# Patient Record
Sex: FEMALE | Race: BLACK OR AFRICAN AMERICAN | Employment: FULL TIME | URBAN - METROPOLITAN AREA
[De-identification: names, ages, dates, MRNs, and addresses within clinical notes are randomized per-mention and may not be internally consistent; named-entity substitution may affect disease eponyms.]

---

## 2017-03-20 ENCOUNTER — HOSPITAL ENCOUNTER (OUTPATIENT)
Dept: LAB | Age: 57
Discharge: HOME | End: 2017-03-20
Payer: COMMERCIAL

## 2017-03-20 LAB
ALBUMIN SERPL BCP-MCNC: 4 G/DL (ref 3.4–5)
ALBUMIN/GLOB SERPL: 1.2 {RATIO} (ref 0.8–1.7)
ALP SERPL-CCNC: 83 U/L (ref 45–117)
ALT SERPL-CCNC: 30 U/L (ref 13–56)
ANION GAP BLD CALC-SCNC: 6 MMOL/L (ref 3–18)
AST SERPL W P-5'-P-CCNC: 25 U/L (ref 15–37)
BASOPHILS # BLD AUTO: 0 K/UL (ref 0–0.1)
BASOPHILS # BLD: 1 % (ref 0–2)
BILIRUB SERPL-MCNC: 0.7 MG/DL (ref 0.2–1)
BUN SERPL-MCNC: 22 MG/DL (ref 7–18)
BUN/CREAT SERPL: 24 (ref 12–20)
CALCIUM SERPL-MCNC: 9.3 MG/DL (ref 8.5–10.1)
CHLORIDE SERPL-SCNC: 107 MMOL/L (ref 100–108)
CHOLEST SERPL-MCNC: 186 MG/DL
CO2 SERPL-SCNC: 29 MMOL/L (ref 21–32)
CREAT SERPL-MCNC: 0.93 MG/DL (ref 0.6–1.3)
DIFFERENTIAL METHOD BLD: ABNORMAL
EOSINOPHIL # BLD: 0.2 K/UL (ref 0–0.4)
EOSINOPHIL NFR BLD: 4 % (ref 0–5)
ERYTHROCYTE [DISTWIDTH] IN BLOOD BY AUTOMATED COUNT: 12.8 % (ref 11.6–14.5)
GLOBULIN SER CALC-MCNC: 3.3 G/DL (ref 2–4)
GLUCOSE SERPL-MCNC: 74 MG/DL (ref 74–99)
HCT VFR BLD AUTO: 37.7 % (ref 35–45)
HDLC SERPL-MCNC: 69 MG/DL (ref 40–60)
HDLC SERPL: 2.7 {RATIO} (ref 0–5)
HGB BLD-MCNC: 12.8 G/DL (ref 12–16)
LDLC SERPL CALC-MCNC: 110.2 MG/DL (ref 0–100)
LIPID PROFILE,FLP: ABNORMAL
LYMPHOCYTES # BLD AUTO: 56 % (ref 21–52)
LYMPHOCYTES # BLD: 2.1 K/UL (ref 0.9–3.6)
MCH RBC QN AUTO: 30.9 PG (ref 24–34)
MCHC RBC AUTO-ENTMCNC: 34 G/DL (ref 31–37)
MCV RBC AUTO: 91.1 FL (ref 74–97)
MONOCYTES # BLD: 0.2 K/UL (ref 0.05–1.2)
MONOCYTES NFR BLD AUTO: 5 % (ref 3–10)
NEUTS SEG # BLD: 1.3 K/UL (ref 1.8–8)
NEUTS SEG NFR BLD AUTO: 34 % (ref 40–73)
PLATELET # BLD AUTO: 167 K/UL (ref 135–420)
PMV BLD AUTO: 11.3 FL (ref 9.2–11.8)
POTASSIUM SERPL-SCNC: 4 MMOL/L (ref 3.5–5.5)
PROT SERPL-MCNC: 7.3 G/DL (ref 6.4–8.2)
RBC # BLD AUTO: 4.14 M/UL (ref 4.2–5.3)
SODIUM SERPL-SCNC: 142 MMOL/L (ref 136–145)
T4 SERPL-MCNC: 10.4 UG/DL (ref 4.5–10.9)
TRIGL SERPL-MCNC: 34 MG/DL (ref ?–150)
TSH SERPL DL<=0.05 MIU/L-ACNC: 1.72 UIU/ML (ref 0.36–3.74)
VLDLC SERPL CALC-MCNC: 6.8 MG/DL
WBC # BLD AUTO: 3.7 K/UL (ref 4.6–13.2)

## 2017-03-20 PROCEDURE — 36415 COLL VENOUS BLD VENIPUNCTURE: CPT | Performed by: INTERNAL MEDICINE

## 2017-03-20 PROCEDURE — 80053 COMPREHEN METABOLIC PANEL: CPT | Performed by: INTERNAL MEDICINE

## 2017-03-20 PROCEDURE — 85025 COMPLETE CBC W/AUTO DIFF WBC: CPT | Performed by: INTERNAL MEDICINE

## 2017-03-20 PROCEDURE — 80061 LIPID PANEL: CPT | Performed by: INTERNAL MEDICINE

## 2017-03-20 PROCEDURE — 84436 ASSAY OF TOTAL THYROXINE: CPT | Performed by: INTERNAL MEDICINE

## 2017-08-22 ENCOUNTER — HOSPITAL ENCOUNTER (OUTPATIENT)
Dept: MAMMOGRAPHY | Age: 57
Discharge: HOME | End: 2017-08-22
Attending: INTERNAL MEDICINE
Payer: COMMERCIAL

## 2017-08-22 DIAGNOSIS — Z12.31 VISIT FOR SCREENING MAMMOGRAM: ICD-10-CM

## 2017-08-22 PROCEDURE — 77067 SCR MAMMO BI INCL CAD: CPT

## 2017-10-31 ENCOUNTER — HOSPITAL ENCOUNTER (OUTPATIENT)
Dept: GENERAL RADIOLOGY | Age: 57
Discharge: HOME | End: 2017-10-31
Payer: COMMERCIAL

## 2017-10-31 ENCOUNTER — HOSPITAL ENCOUNTER (OUTPATIENT)
Dept: LAB | Age: 57
Discharge: HOME | End: 2017-10-31
Payer: COMMERCIAL

## 2017-10-31 DIAGNOSIS — M54.41 RIGHT-SIDED LOW BACK PAIN WITH SCIATICA: ICD-10-CM

## 2017-10-31 LAB
ALBUMIN SERPL-MCNC: 3.6 G/DL (ref 3.4–5)
ALBUMIN/GLOB SERPL: 1 {RATIO} (ref 0.8–1.7)
ALP SERPL-CCNC: 84 U/L (ref 45–117)
ALT SERPL-CCNC: 36 U/L (ref 13–56)
ANION GAP SERPL CALC-SCNC: 8 MMOL/L (ref 3–18)
AST SERPL-CCNC: 32 U/L (ref 15–37)
BASOPHILS # BLD: 0 K/UL (ref 0–0.1)
BASOPHILS NFR BLD: 0 % (ref 0–3)
BILIRUB SERPL-MCNC: 1 MG/DL (ref 0.2–1)
BUN SERPL-MCNC: 14 MG/DL (ref 7–18)
BUN/CREAT SERPL: 19 (ref 12–20)
CALCIUM SERPL-MCNC: 9.1 MG/DL (ref 8.5–10.1)
CHLORIDE SERPL-SCNC: 108 MMOL/L (ref 100–108)
CHOLEST SERPL-MCNC: 191 MG/DL
CO2 SERPL-SCNC: 25 MMOL/L (ref 21–32)
CREAT SERPL-MCNC: 0.75 MG/DL (ref 0.6–1.3)
DIFFERENTIAL METHOD BLD: ABNORMAL
EOSINOPHIL # BLD: 0.2 K/UL (ref 0–0.4)
EOSINOPHIL NFR BLD: 9 % (ref 0–5)
ERYTHROCYTE [DISTWIDTH] IN BLOOD BY AUTOMATED COUNT: 12.6 % (ref 11.6–14.5)
GLOBULIN SER CALC-MCNC: 3.7 G/DL (ref 2–4)
GLUCOSE SERPL-MCNC: 78 MG/DL (ref 74–99)
HCT VFR BLD AUTO: 39.2 % (ref 35–45)
HDLC SERPL-MCNC: 79 MG/DL (ref 40–60)
HDLC SERPL: 2.4 {RATIO} (ref 0–5)
HGB BLD-MCNC: 13.1 G/DL (ref 12–16)
LDLC SERPL CALC-MCNC: 106 MG/DL (ref 0–100)
LIPID PROFILE,FLP: ABNORMAL
LYMPHOCYTES # BLD: 1.4 K/UL (ref 0.8–3.5)
LYMPHOCYTES NFR BLD: 54 % (ref 20–51)
MCH RBC QN AUTO: 30.3 PG (ref 24–34)
MCHC RBC AUTO-ENTMCNC: 33.4 G/DL (ref 31–37)
MCV RBC AUTO: 90.7 FL (ref 74–97)
MONOCYTES # BLD: 0.2 K/UL (ref 0–1)
MONOCYTES NFR BLD: 6 % (ref 2–9)
NEUTS BAND NFR BLD MANUAL: 1 % (ref 0–5)
NEUTS SEG # BLD: 0.8 K/UL (ref 1.8–8)
NEUTS SEG NFR BLD: 30 % (ref 42–75)
PLATELET # BLD AUTO: 150 K/UL (ref 135–420)
PLATELET COMMENTS,PCOM: ABNORMAL
PMV BLD AUTO: 11 FL (ref 9.2–11.8)
POTASSIUM SERPL-SCNC: 4.3 MMOL/L (ref 3.5–5.5)
PROT SERPL-MCNC: 7.3 G/DL (ref 6.4–8.2)
RBC # BLD AUTO: 4.32 M/UL (ref 4.2–5.3)
RBC MORPH BLD: ABNORMAL
RBC MORPH BLD: ABNORMAL
SODIUM SERPL-SCNC: 141 MMOL/L (ref 136–145)
T4 SERPL-MCNC: 12.3 UG/DL (ref 4.5–10.9)
TRIGL SERPL-MCNC: 30 MG/DL (ref ?–150)
TSH SERPL DL<=0.05 MIU/L-ACNC: 1.29 UIU/ML (ref 0.36–3.74)
VLDLC SERPL CALC-MCNC: 6 MG/DL
WBC # BLD AUTO: 2.6 K/UL (ref 4.6–13.2)
WBC MORPH BLD: ABNORMAL

## 2017-10-31 PROCEDURE — 80061 LIPID PANEL: CPT | Performed by: INTERNAL MEDICINE

## 2017-10-31 PROCEDURE — 84436 ASSAY OF TOTAL THYROXINE: CPT | Performed by: INTERNAL MEDICINE

## 2017-10-31 PROCEDURE — 85025 COMPLETE CBC W/AUTO DIFF WBC: CPT | Performed by: INTERNAL MEDICINE

## 2017-10-31 PROCEDURE — 80053 COMPREHEN METABOLIC PANEL: CPT | Performed by: INTERNAL MEDICINE

## 2017-10-31 PROCEDURE — 36415 COLL VENOUS BLD VENIPUNCTURE: CPT | Performed by: INTERNAL MEDICINE

## 2017-10-31 PROCEDURE — 73502 X-RAY EXAM HIP UNI 2-3 VIEWS: CPT

## 2018-04-06 ENCOUNTER — TELEPHONE (OUTPATIENT)
Dept: CARDIOLOGY CLINIC | Age: 58
End: 2018-04-06

## 2018-04-06 ENCOUNTER — HOSPITAL ENCOUNTER (OUTPATIENT)
Age: 58
Setting detail: OBSERVATION
Discharge: HOME | End: 2018-04-07
Attending: EMERGENCY MEDICINE | Admitting: INTERNAL MEDICINE
Payer: COMMERCIAL

## 2018-04-06 ENCOUNTER — APPOINTMENT (OUTPATIENT)
Dept: MRI IMAGING | Age: 58
End: 2018-04-06
Attending: PHYSICIAN ASSISTANT
Payer: COMMERCIAL

## 2018-04-06 ENCOUNTER — DOCUMENTATION ONLY (OUTPATIENT)
Dept: CARDIOLOGY CLINIC | Age: 58
End: 2018-04-06

## 2018-04-06 ENCOUNTER — APPOINTMENT (OUTPATIENT)
Dept: GENERAL RADIOLOGY | Age: 58
End: 2018-04-06
Attending: PHYSICIAN ASSISTANT
Payer: COMMERCIAL

## 2018-04-06 DIAGNOSIS — I48.19 PERSISTENT ATRIAL FIBRILLATION (HCC): Primary | ICD-10-CM

## 2018-04-06 DIAGNOSIS — R32 URINARY INCONTINENCE, UNSPECIFIED TYPE: ICD-10-CM

## 2018-04-06 PROBLEM — I48.91 NEW ONSET ATRIAL FIBRILLATION (HCC): Status: ACTIVE | Noted: 2018-04-06

## 2018-04-06 LAB
ANION GAP SERPL CALC-SCNC: 9 MMOL/L (ref 3–18)
APPEARANCE UR: CLEAR
ATRIAL RATE: 147 BPM
BASOPHILS # BLD: 0 K/UL (ref 0–0.1)
BASOPHILS NFR BLD: 0 % (ref 0–2)
BILIRUB UR QL: NEGATIVE
BUN SERPL-MCNC: 16 MG/DL (ref 7–18)
BUN/CREAT SERPL: 20 (ref 12–20)
CALCIUM SERPL-MCNC: 9.8 MG/DL (ref 8.5–10.1)
CALCULATED R AXIS, ECG10: 66 DEGREES
CALCULATED T AXIS, ECG11: 49 DEGREES
CHLORIDE SERPL-SCNC: 110 MMOL/L (ref 100–108)
CK MB CFR SERPL CALC: 0.8 % (ref 0–4)
CK MB CFR SERPL CALC: 1 % (ref 0–4)
CK MB SERPL-MCNC: 1.3 NG/ML (ref 5–25)
CK MB SERPL-MCNC: 1.8 NG/ML (ref 5–25)
CK SERPL-CCNC: 155 U/L (ref 26–192)
CK SERPL-CCNC: 174 U/L (ref 26–192)
CO2 SERPL-SCNC: 25 MMOL/L (ref 21–32)
COLOR UR: YELLOW
CREAT SERPL-MCNC: 0.82 MG/DL (ref 0.6–1.3)
D DIMER PPP FEU-MCNC: 0.37 UG/ML(FEU)
DIAGNOSIS, 93000: NORMAL
DIFFERENTIAL METHOD BLD: ABNORMAL
EOSINOPHIL # BLD: 0.1 K/UL (ref 0–0.4)
EOSINOPHIL NFR BLD: 5 % (ref 0–5)
ERYTHROCYTE [DISTWIDTH] IN BLOOD BY AUTOMATED COUNT: 12.8 % (ref 11.6–14.5)
GLUCOSE SERPL-MCNC: 82 MG/DL (ref 74–99)
GLUCOSE UR STRIP.AUTO-MCNC: NEGATIVE MG/DL
HCT VFR BLD AUTO: 39 % (ref 35–45)
HGB BLD-MCNC: 13.4 G/DL (ref 12–16)
HGB UR QL STRIP: NEGATIVE
KETONES UR QL STRIP.AUTO: NEGATIVE MG/DL
LEUKOCYTE ESTERASE UR QL STRIP.AUTO: NEGATIVE
LYMPHOCYTES # BLD: 1.4 K/UL (ref 0.9–3.6)
LYMPHOCYTES NFR BLD: 47 % (ref 21–52)
MCH RBC QN AUTO: 30.6 PG (ref 24–34)
MCHC RBC AUTO-ENTMCNC: 34.4 G/DL (ref 31–37)
MCV RBC AUTO: 89 FL (ref 74–97)
MONOCYTES # BLD: 0.1 K/UL (ref 0.05–1.2)
MONOCYTES NFR BLD: 5 % (ref 3–10)
NEUTS SEG # BLD: 1.2 K/UL (ref 1.8–8)
NEUTS SEG NFR BLD: 43 % (ref 40–73)
NITRITE UR QL STRIP.AUTO: NEGATIVE
PH UR STRIP: 8.5 [PH] (ref 5–8)
PLATELET # BLD AUTO: 199 K/UL (ref 135–420)
PMV BLD AUTO: 10.5 FL (ref 9.2–11.8)
POTASSIUM SERPL-SCNC: 4.2 MMOL/L (ref 3.5–5.5)
PROT UR STRIP-MCNC: NEGATIVE MG/DL
Q-T INTERVAL, ECG07: 348 MS
QRS DURATION, ECG06: 76 MS
QTC CALCULATION (BEZET), ECG08: 423 MS
RBC # BLD AUTO: 4.38 M/UL (ref 4.2–5.3)
SODIUM SERPL-SCNC: 144 MMOL/L (ref 136–145)
SP GR UR REFRACTOMETRY: 1.01 (ref 1–1.03)
TROPONIN I SERPL-MCNC: <0.02 NG/ML (ref 0–0.04)
TROPONIN I SERPL-MCNC: <0.02 NG/ML (ref 0–0.04)
UROBILINOGEN UR QL STRIP.AUTO: 0.2 EU/DL (ref 0.2–1)
VENTRICULAR RATE, ECG03: 89 BPM
WBC # BLD AUTO: 2.9 K/UL (ref 4.6–13.2)

## 2018-04-06 PROCEDURE — 99218 HC RM OBSERVATION: CPT

## 2018-04-06 PROCEDURE — 80048 BASIC METABOLIC PNL TOTAL CA: CPT | Performed by: PHYSICIAN ASSISTANT

## 2018-04-06 PROCEDURE — 96374 THER/PROPH/DIAG INJ IV PUSH: CPT

## 2018-04-06 PROCEDURE — 85379 FIBRIN DEGRADATION QUANT: CPT | Performed by: PHYSICIAN ASSISTANT

## 2018-04-06 PROCEDURE — 93005 ELECTROCARDIOGRAM TRACING: CPT

## 2018-04-06 PROCEDURE — 99285 EMERGENCY DEPT VISIT HI MDM: CPT

## 2018-04-06 PROCEDURE — 82550 ASSAY OF CK (CPK): CPT | Performed by: PHYSICIAN ASSISTANT

## 2018-04-06 PROCEDURE — 51798 US URINE CAPACITY MEASURE: CPT

## 2018-04-06 PROCEDURE — 85025 COMPLETE CBC W/AUTO DIFF WBC: CPT | Performed by: PHYSICIAN ASSISTANT

## 2018-04-06 PROCEDURE — 65660000000 HC RM CCU STEPDOWN

## 2018-04-06 PROCEDURE — 74011250636 HC RX REV CODE- 250/636: Performed by: EMERGENCY MEDICINE

## 2018-04-06 PROCEDURE — 81003 URINALYSIS AUTO W/O SCOPE: CPT | Performed by: PHYSICIAN ASSISTANT

## 2018-04-06 PROCEDURE — 71045 X-RAY EXAM CHEST 1 VIEW: CPT

## 2018-04-06 PROCEDURE — 72148 MRI LUMBAR SPINE W/O DYE: CPT

## 2018-04-06 PROCEDURE — 74011250637 HC RX REV CODE- 250/637: Performed by: PHYSICIAN ASSISTANT

## 2018-04-06 RX ORDER — LORAZEPAM 2 MG/ML
0.25 INJECTION INTRAMUSCULAR
Status: COMPLETED | OUTPATIENT
Start: 2018-04-06 | End: 2018-04-06

## 2018-04-06 RX ORDER — LORAZEPAM 2 MG/ML
0.25 INJECTION INTRAMUSCULAR
Status: DISCONTINUED | OUTPATIENT
Start: 2018-04-06 | End: 2018-04-06 | Stop reason: SDUPTHER

## 2018-04-06 RX ORDER — GUAIFENESIN 100 MG/5ML
324 LIQUID (ML) ORAL
Status: COMPLETED | OUTPATIENT
Start: 2018-04-06 | End: 2018-04-06

## 2018-04-06 RX ORDER — OXYCODONE AND ACETAMINOPHEN 5; 325 MG/1; MG/1
1 TABLET ORAL
Status: COMPLETED | OUTPATIENT
Start: 2018-04-06 | End: 2018-04-06

## 2018-04-06 RX ORDER — MORPHINE SULFATE 2 MG/ML
2 INJECTION, SOLUTION INTRAMUSCULAR; INTRAVENOUS ONCE
Status: DISCONTINUED | OUTPATIENT
Start: 2018-04-06 | End: 2018-04-06

## 2018-04-06 RX ADMIN — OXYCODONE HYDROCHLORIDE AND ACETAMINOPHEN 1 TABLET: 5; 325 TABLET ORAL at 15:17

## 2018-04-06 RX ADMIN — ASPIRIN 81 MG 324 MG: 81 TABLET ORAL at 09:46

## 2018-04-06 RX ADMIN — LORAZEPAM 0.26 MG: 2 INJECTION, SOLUTION INTRAMUSCULAR; INTRAVENOUS at 17:26

## 2018-04-06 RX ADMIN — APIXABAN 5 MG: 5 TABLET, FILM COATED ORAL at 13:48

## 2018-04-06 NOTE — ED PROVIDER NOTES
EMERGENCY DEPARTMENT HISTORY AND PHYSICAL EXAM    9:07 AM      Date: 4/6/2018  Patient Name: Deb Hopkins    History of Presenting Illness     Chief Complaint   Patient presents with    Palpitations         History Provided By: Patient    Chief Complaint: Palpitations   Duration: 1 Hours  Timing:  Constant and Improving  Location: Across chest   Quality: N/A  Severity: Mild  Modifying Factors: None   Associated Symptoms: SOB, lightheadedness      Additional History (Context): Deb Hopkins is a 66 y.o. female with a PMH of HTN and OA presenting to the ED via EMS with c/o constant, improving palpitations that began 1 hour ago. Pt reports sx began after giving 3 baths, pt is a nursing assistant. States she feels her chest is \"heavy and pounding\". Denies any CP, fever, chills, cough, headache, leg swelling, abdominal pain, nausea, vomiting, diarrhea or urinary sx. Associated sx include SOB and lightheadedness. Severity is mild. Denies any hx of cardiac issues. No other sx or complaints given at this time. PCP: Tan Doll MD    Current Facility-Administered Medications   Medication Dose Route Frequency Provider Last Rate Last Dose    apixaban (ELIQUIS) tablet 5 mg  5 mg Oral NOW SAI Russell        morphine injection 2 mg  2 mg IntraVENous ONCE SAI Russell         Current Outpatient Prescriptions   Medication Sig Dispense Refill    apixaban (ELIQUIS) 5 mg tablet Take 1 Tab by mouth two (2) times a day. 30 Tab 0    multivitamin (ONE A DAY) tablet Take 1 Tab by mouth daily. Indications: VITAMIN DEFICIENCY PREVENTION         Past History     Past Medical History:  Past Medical History:   Diagnosis Date    Arthritis     bl knees        Past Surgical History:  History reviewed. No pertinent surgical history. Family History:  History reviewed. No pertinent family history.     Social History:  Social History   Substance Use Topics    Smoking status: Never Smoker    Smokeless tobacco: None    Alcohol use No       Allergies:  No Known Allergies      Review of Systems       Review of Systems   Constitutional: Negative for fever. Respiratory: Positive for shortness of breath. Negative for cough. Cardiovascular: Positive for palpitations. Negative for chest pain and leg swelling. Gastrointestinal: Negative for abdominal pain, diarrhea, nausea and vomiting. Neurological: Positive for light-headedness. All other systems reviewed and are negative. Physical Exam     Visit Vitals    /79    Pulse 87    Temp 97.7 °F (36.5 °C)    Resp 25    Ht 5' 2\" (1.575 m)    Wt 94.3 kg (208 lb)    SpO2 100%    BMI 38.04 kg/m2         Physical Exam   Constitutional: She is oriented to person, place, and time. She appears well-developed and well-nourished. No distress. HENT:   Head: Normocephalic and atraumatic. Eyes: Conjunctivae are normal. Right eye exhibits no discharge. Left eye exhibits no discharge. No scleral icterus. Neck: Normal range of motion. Neck supple. Cardiovascular: Normal rate, regular rhythm and normal heart sounds. Pulmonary/Chest: Breath sounds normal. No respiratory distress. She has no wheezes. She has no rales. Mild tachypnea    Musculoskeletal: Normal range of motion. Neurological: She is alert and oriented to person, place, and time. She exhibits normal muscle tone. Skin: Skin is warm and dry. No rash noted. She is not diaphoretic. No erythema. No pallor. Psychiatric: She has a normal mood and affect. Her behavior is normal. Judgment and thought content normal.   Nursing note and vitals reviewed.         Diagnostic Study Results     Labs -  Recent Results (from the past 12 hour(s))   EKG, 12 LEAD, INITIAL    Collection Time: 04/06/18  9:11 AM   Result Value Ref Range    Ventricular Rate 89 BPM    Atrial Rate 147 BPM    QRS Duration 76 ms    Q-T Interval 348 ms    QTC Calculation (Bezet) 423 ms    Calculated R Axis 66 degrees Calculated T Axis 49 degrees    Diagnosis       Atrial fibrillation  Abnormal ECG  When compared with ECG of 14-AUG-2014 07:45,  Atrial fibrillation has replaced Sinus rhythm     CBC WITH AUTOMATED DIFF    Collection Time: 04/06/18  9:26 AM   Result Value Ref Range    WBC 2.9 (L) 4.6 - 13.2 K/uL    RBC 4.38 4.20 - 5.30 M/uL    HGB 13.4 12.0 - 16.0 g/dL    HCT 39.0 35.0 - 45.0 %    MCV 89.0 74.0 - 97.0 FL    MCH 30.6 24.0 - 34.0 PG    MCHC 34.4 31.0 - 37.0 g/dL    RDW 12.8 11.6 - 14.5 %    PLATELET 770 366 - 108 K/uL    MPV 10.5 9.2 - 11.8 FL    NEUTROPHILS 43 40 - 73 %    LYMPHOCYTES 47 21 - 52 %    MONOCYTES 5 3 - 10 %    EOSINOPHILS 5 0 - 5 %    BASOPHILS 0 0 - 2 %    ABS. NEUTROPHILS 1.2 (L) 1.8 - 8.0 K/UL    ABS. LYMPHOCYTES 1.4 0.9 - 3.6 K/UL    ABS. MONOCYTES 0.1 0.05 - 1.2 K/UL    ABS. EOSINOPHILS 0.1 0.0 - 0.4 K/UL    ABS.  BASOPHILS 0.0 0.0 - 0.1 K/UL    DF AUTOMATED     CARDIAC PANEL,(CK, CKMB & TROPONIN)    Collection Time: 04/06/18  9:26 AM   Result Value Ref Range     26 - 192 U/L    CK - MB 1.8 <3.6 ng/ml    CK-MB Index 1.0 0.0 - 4.0 %    Troponin-I, Qt. <0.02 0.0 - 9.950 NG/ML   METABOLIC PANEL, BASIC    Collection Time: 04/06/18  9:26 AM   Result Value Ref Range    Sodium 144 136 - 145 mmol/L    Potassium 4.2 3.5 - 5.5 mmol/L    Chloride 110 (H) 100 - 108 mmol/L    CO2 25 21 - 32 mmol/L    Anion gap 9 3.0 - 18 mmol/L    Glucose 82 74 - 99 mg/dL    BUN 16 7.0 - 18 MG/DL    Creatinine 0.82 0.6 - 1.3 MG/DL    BUN/Creatinine ratio 20 12 - 20      GFR est AA >60 >60 ml/min/1.73m2    GFR est non-AA >60 >60 ml/min/1.73m2    Calcium 9.8 8.5 - 10.1 MG/DL   URINALYSIS W/ RFLX MICROSCOPIC    Collection Time: 04/06/18  9:40 AM   Result Value Ref Range    Color YELLOW      Appearance CLEAR      Specific gravity 1.006 1.005 - 1.030      pH (UA) 8.5 (H) 5.0 - 8.0      Protein NEGATIVE  NEG mg/dL    Glucose NEGATIVE  NEG mg/dL    Ketone NEGATIVE  NEG mg/dL    Bilirubin NEGATIVE  NEG      Blood NEGATIVE  NEG Urobilinogen 0.2 0.2 - 1.0 EU/dL    Nitrites NEGATIVE  NEG      Leukocyte Esterase NEGATIVE  NEG     CARDIAC PANEL,(CK, CKMB & TROPONIN)    Collection Time: 04/06/18 12:46 PM   Result Value Ref Range     26 - 192 U/L    CK - MB 1.3 <3.6 ng/ml    CK-MB Index 0.8 0.0 - 4.0 %    Troponin-I, Qt. <0.02 0.0 - 0.045 NG/ML       Radiologic Studies -   XR CHEST PORT   Final Result        IMPRESSION: Minimal congestion. EKG: Afib at 89bpm.     Pt's HR is around 80-90bpm.  Unsure exactly when her Afib began, pt does not have a hx of this. Plan to anticoagulate -- discussed with patient regarding different options, she states she would like to defer the treatment option to the cardiologist.     Consult 11:10 AM:   I have discussed case with Dr. Caroline Bauman. Standard discussion regarding this patient's presenting symptoms, PMHX, exam, vital signs, available ancillary and diagnostic studies. Consulting MD states: he recommends Eliquis 5mg BID and will see the patient in the office next week. Repeat troponin WNL. Pt is persistently tachypneic , will obtain Ddimer. Ddimer WNL, plan to discharge home. 3:00PM Dr. More Gold informed me that the patient is continuing to urinate all over herself. She has had urinary incontinence throughout her stay. Her UA was clear. She does not have any back pain. 3:05PM Pt urinated on herself again. She has no rectal tone on rectal exam.  STAT MRI ordered. Called down to MRI and they said they have a patient on the table for the next hour but will take her afterwards. Pt is currently complaining of left calf pain. She was just given Percocet. 3:30PM Reevaluated the patient. She is still having some pain in her chest.  She is still refusing morphine, states she wants to see if her percocet kicks in more. Will let me know if she changes her mind about the morphine. 6:09 PM Patient is in MRI now.      6:10 PM : Pt care transferred to Dorene Shay NP, ED provider. History of patient complaint(s), available diagnostic reports and current treatment plan has been discussed thoroughly. Intended disposition of patient : TBD  Pending diagnostics reports and/or labs (please list): MRI L Spine     Diagnosis     Clinical Impression:   1. Persistent atrial fibrillation ([address]      _______________________________    Attestations:  Scribe Attestation     Paxton Gonzalez acting as a scribe for and in the presence of Ronald Avendaño PA-C      April 06, 2018 at 9:07 AM       Provider Attestation:      I personally performed the services described in the documentation, reviewed the documentation, as recorded by the scribe in my presence, and it accurately and completely records my words and actions. April 06, 2018 at 9:07 AM - Ronald Avendaño PA-C  _______________________________    SAI Funes           10:32 PM :Pt care assumed from Thierry GIBBS, ED provider. Pt complaint(s), current treatment plan, progression and available diagnostic results have been discussed thoroughly.   Rounding occurred: no  Intended Disposition: admit  Pending diagnostic reports and/or labs (please list): MRI of lumbar spine      Vitals:  Patient Vitals for the past 12 hrs:   Pulse Resp BP SpO2   04/06/18 1330 85 20 144/84 100 %   04/06/18 1313 87 25 - 100 %   04/06/18 1300 92 22 129/88 100 %   04/06/18 1230 83 23 129/86 98 %   04/06/18 1200 89 24 128/83 100 %   04/06/18 1130 94 25 128/90 100 %   04/06/18 1100 86 20 116/71 99 %       Medications ordered:   Medications   LORazepam (ATIVAN) injection 0.26 mg (not administered)   aspirin chewable tablet 324 mg (324 mg Oral Given 4/6/18 0946)   apixaban (ELIQUIS) tablet 5 mg (5 mg Oral Given 4/6/18 1348)   oxyCODONE-acetaminophen (PERCOCET) 5-325 mg per tablet 1 Tab (1 Tab Oral Given 4/6/18 1517)         Lab findings:  Recent Results (from the past 12 hour(s))   CARDIAC PANEL,(CK, CKMB & TROPONIN)    Collection Time: 04/06/18 12:46 PM Result Value Ref Range     26 - 192 U/L    CK - MB 1.3 <3.6 ng/ml    CK-MB Index 0.8 0.0 - 4.0 %    Troponin-I, Qt. <0.02 0.0 - 0.045 NG/ML          X-Ray, CT or other radiology findings or impressions:  MRI LUMB SPINE WO CONT   Final Result      XR CHEST PORT   Final Result      DUPLEX LOWER EXT VENOUS BILAT    (Results Pending)   MRI BRAIN W WO CONT    (Results Pending)   MRI CERV SPINE W CONT    (Results Pending)     INDICATION:  Chest pain.     COMPARISON:  11/6     FINDINGS: A portable AP radiograph of the chest was obtained at 1008 hours. Cardiac silhouette is within normal limits for technique. Mildly prominent  pulmonary vasculature. No confluent consolidation. No acute osseous abnormality.     IMPRESSION  IMPRESSION: Minimal congestion. EXAMINATION: MRI lumbar spine without contrast     INDICATION: Lumbar pain, cauda equina syndrome, urinary incontinence     COMPARISON: None     TECHNIQUE: Sagittal and axial multiecho MRI sequences of the lumbar spine  performed without contrast.     FINDINGS:     General: Vertebral body and disc space heights are preserved. L4-L5 mild disc  desiccation with trace anterolisthesis. Lumbar lordosis maintained. Conus ends  at level L2-L3. No abnormal signal in the distal cord. No abnormal epidural  collection identified. No suspicious marrow signal.     Miscellaneous: Probable large right hepatic lobe cyst, incompletely imaged. Gallbladder appears slightly distended.     Levels:     T9-T10, T10-T11: Evaluated sagittal plane only. No significant posterior disc  herniation or stenosis.     T11-T12: No posterior disc herniation and no significant stenosis. Mild facet  arthropathy.     T12-L1: No significant posterior disc herniation or stenosis. Mild facet  arthropathy.     L1-L2: No significant disc disease or stenosis. Mild facet arthropathy.     L2-L3: No significant disc disease or stenosis. Mild facet arthropathy.     L3-L4: Minimal disc bulge.  Mild facet arthropathy. Perhaps mild congenital  narrowing. No significant extrinsic stenosis. Mild foraminal stenoses.     L4-L5: Trace anterolisthesis with mild disc bulge. Moderate facet arthropathy  with ligamentum flavum bulge. Perhaps mild component of congenital spinal canal  narrowing. Overall mild to moderate spinal canal stenosis with probable abutment  of the L5 roots at lateral recess level. Moderate foraminal stenoses with  possible abutment of the exiting left L4 root.     L5-S1: Minimal disc bulge. Mild to moderate facet arthropathy. Possible abutment  of the S1 roots at lateral recess. Mild right greater than left foraminal  stenoses.     Imaged sacrum: Unremarkable.     IMPRESSION  IMPRESSION:     1. At L4-L5, there is mild to moderate spinal canal stenosis due to combination  of degenerative changes and mild congenital narrowing, and moderate foraminal  stenoses, with probable abutment of the L5 roots at lateral recess, and exiting  left L4 root.     -Lesser degree degenerative changes and stenoses at L3-L4 and L5-S1.     -See level by level details above.     2. Probable large right hepatic lobe cyst incompletely imaged. Slightly  distended gallbladder noted.             Progress notes, Consult notes or additional Procedure notes:       Discussed case with Dr. Russell Lambert, he consulted with Dr. Ivanna Crawley and Dr. Kathleen Harrison with Spinal specialist.      Discussed with Dr. Leah Phelan with [facility] he will evaluate the pt. Dr. Leah Phelan called back after evaluation and informed no Neuro surgical emergency and pt will be admitted here at [facility] and ok for spinal consult while in hosp and to have a MRI with contrast of brain and c-spine in the morning. Pt advised of admission and plan         Disposition:    Diagnosis:   1. Persistent atrial fibrillation ([address]    2.  Urinary incontinence, unspecified type        Disposition: Admitted to Hospitalist             ADMISSION NOTE:  10:34 PM  Patient is being admitted to the hospital by Dr. Iliana Amanda,  The results of their tests and reasons for their admission have been discussed with them and/or available family. They convey agreement and understanding for the need to be admitted and for their admission diagnosis.         Carroll DINHP-C,FNP-C

## 2018-04-06 NOTE — IP AVS SNAPSHOT
[address] Patient: Promise Sylvester MRN: [id number] :1960 A check amanda indicates which time of day the medication should be taken. My Medications CONTINUE taking these medications Instructions Each Dose to Equal  
 Morning Noon Evening Bedtime  
 multivitamin tablet Commonly known as:  ONE A DAY Your last dose was: Your next dose is: Take 1 Tab by mouth daily. Indications: VITAMIN DEFICIENCY PREVENTION  
 1 Tab TYLENOL ARTHRITIS PAIN 650 mg Gayleen Rising Generic drug:  acetaminophen Your last dose was: Your next dose is: Take 650 mg by mouth every eight (8) hours. Indications: Arthritic Pain 650 mg  
    
   
   
   
  
  
STOP taking these medications MOTRIN  mg tablet Generic drug:  ibuprofen ASK your doctor about these medications Instructions Each Dose to Equal  
 Morning Noon Evening Bedtime * apixaban 5 mg tablet Commonly known as:  Sharad Martin What changed:  Another medication with the same name was added. Make sure you understand how and when to take each. Ask about: Which instructions should I use? Your last dose was: Your next dose is: Take 5 mg by mouth two (2) times a day. 5 mg * apixaban 5 mg tablet Commonly known as:  Sharad Martin What changed: You were already taking a medication with the same name, and this prescription was added. Make sure you understand how and when to take each. Ask about: Which instructions should I use? Your last dose was: Your next dose is: Take 1 Tab by mouth two (2) times a day. 5 mg * Notice: This list has 2 medication(s) that are the same as other medications prescribed for you.  Read the directions carefully, and ask your doctor or other care provider to review them with you. Where to Get Your Medications Information on where to get these meds will be given to you by the nurse or doctor. ! Ask your nurse or doctor about these medications  
  apixaban 5 mg tablet

## 2018-04-06 NOTE — TELEPHONE ENCOUNTER
Left message w/male that answered the phone to have patient call office to schedule NP appt with Dr Solange Iniguez next week. Didier Mandel  Female, 66 y.o., 1960  Weight:   94.3 kg (208 lb)  Phone:   [phone]  PCP:   Tan Doll MD  MRN:   [id number]  MyChart:   Declined  Next Appt (With Me)  None  Next Appt (Any Provider)  None    Message  Received: Today       Michaela Pastor       Caller: Unspecified (Today, 12:04 PM)                       Previous Messages       ----- Message -----      From: Dakota Pisano MD      Sent: 4/6/2018  12:04 PM        To: Michelle Herrera     ----- Message from Dakota Pisano MD sent at 4/6/2018 12:04 PM EDT -----   Seen er today with new afib, have her see me next week, add-on, thx

## 2018-04-06 NOTE — ED TRIAGE NOTES
Pt arrived via EMS alert & oriented times 4. Pt c/o heart palpitations at work. EMS states she is in afib. New onset.

## 2018-04-06 NOTE — IP AVS SNAPSHOT
[facility] 
 
 
 [address] Patient: Promise Sylvester MRN: [id number] :1960 About your hospitalization You were admitted on:  2018 You last received care in the:  [facility] [address] [facility]. You were discharged on:  2018 Why you were hospitalized Your primary diagnosis was:  Not on File Your diagnoses also included:  Incontinence Of Urine, New Onset Atrial Fibrillation (Hcc), Incontinent Of Urine Follow-up Information Follow up With Details Comments Contact Info Jamal Mccauley MD In 3 days  [place] [address] [address] [facility] 
[phone] [facility] EMERGENCY DEPT  If symptoms worsen [address] [place] [address] [address] Cally Quiroga MD   500 [facility] [address] [place] [address] 
[phone] Discharge Orders None A check amanda indicates which time of day the medication should be taken. My Medications CONTINUE taking these medications Instructions Each Dose to Equal  
 Morning Noon Evening Bedtime  
 multivitamin tablet Commonly known as:  ONE A DAY Your last dose was: Your next dose is: Take 1 Tab by mouth daily. Indications: VITAMIN DEFICIENCY PREVENTION  
 1 Tab TYLENOL ARTHRITIS PAIN 650 mg Sharon Moraima Generic drug:  acetaminophen Your last dose was: Your next dose is: Take 650 mg by mouth every eight (8) hours. Indications: Arthritic Pain 650 mg  
    
   
   
   
  
  
STOP taking these medications MOTRIN  mg tablet Generic drug:  ibuprofen ASK your doctor about these medications Instructions Each Dose to Equal  
 Morning Noon Evening Bedtime * apixaban 5 mg tablet Commonly known as:  Malick Villarreal What changed:  Another medication with the same name was added. Make sure you understand how and when to take each. Ask about: Which instructions should I use? Your last dose was: Your next dose is: Take 5 mg by mouth two (2) times a day. 5 mg * apixaban 5 mg tablet Commonly known as:  Jaxon Woodall What changed: You were already taking a medication with the same name, and this prescription was added. Make sure you understand how and when to take each. Ask about: Which instructions should I use? Your last dose was: Your next dose is: Take 1 Tab by mouth two (2) times a day. 5 mg * Notice: This list has 2 medication(s) that are the same as other medications prescribed for you. Read the directions carefully, and ask your doctor or other care provider to review them with you. Where to Get Your Medications Information on where to get these meds will be given to you by the nurse or doctor. ! Ask your nurse or doctor about these medications  
  apixaban 5 mg tablet Discharge Instructions Patient armband removed and shredded DISCHARGE SUMMARY from Nurse PATIENT INSTRUCTIONS: 
 
 
F-face looks uneven A-arms unable to move or move unevenly S-speech slurred or non-existent T-time-call 911 as soon as signs and symptoms begin-DO NOT go Back to bed or wait to see if you get better-TIME IS BRAIN. Warning Signs of HEART ATTACK Call 911 if you have these symptoms: 
? Chest discomfort.  Most heart attacks involve discomfort in the center of the chest that lasts more than a few minutes, or that goes away and comes back. It can feel like uncomfortable pressure, squeezing, fullness, or pain. ? Discomfort in other areas of the upper body. Symptoms can include pain or discomfort in one or both arms, the back, neck, jaw, or stomach. ? Shortness of breath with or without chest discomfort. ? Other signs may include breaking out in a cold sweat, nausea, or lightheadedness. Don't wait more than five minutes to call [address]! Fast action can save your life. Calling 911 is almost always the fastest way to get lifesaving treatment. Emergency Medical Services staff can begin treatment when they arrive  up to an hour sooner than if someone gets to the hospital by car. The discharge information has been reviewed with the patient. The patient verbalized understanding. Discharge medications reviewed with the patient and appropriate educational materials and side effects teaching were provided. ___________________________________________________________________________________________________________________________________ Atrial Fibrillation: Care Instructions Your Care Instructions Atrial fibrillation is an irregular and often fast heartbeat. Treating this condition is important for several reasons. It can cause blood clots, which can travel from your heart to your brain and cause a stroke. If you have a fast heartbeat, you may feel lightheaded, dizzy, and weak. An irregular heartbeat can also increase your risk for heart failure. Atrial fibrillation is often the result of another heart condition, such as high blood pressure or coronary artery disease. Making changes to improve your heart condition will help you stay healthy and active. Follow-up care is a key part of your treatment and safety. Be sure to make and go to all appointments, and call your doctor if you are having problems. It's also a good idea to know your test results and keep a list of the medicines you take. How can you care for yourself at home? Medicines ? · Take your medicines exactly as prescribed. Call your doctor if you think you are having a problem with your medicine. You will get more details on the specific medicines your doctor prescribes. ? · If your doctor has given you a blood thinner to prevent a stroke, be sure you get instructions about how to take your medicine safely. Blood thinners can cause serious bleeding problems. ? · Do not take any vitamins, over-the-counter drugs, or herbal products without talking to your doctor first. ? Lifestyle changes ? · Do not smoke. Smoking can increase your chance of a stroke and heart attack. If you need help quitting, talk to your doctor about stop-smoking programs and medicines. These can increase your chances of quitting for good. ? · Eat a heart-healthy diet. ? · Stay at a healthy weight. Lose weight if you need to.  
? · Limit alcohol to 2 drinks a day for men and 1 drink a day for women. Too much alcohol can cause health problems. ? · Avoid colds and flu. Get a pneumococcal vaccine shot. If you have had one before, ask your doctor whether you need another dose. Get a flu shot every year. If you must be around people with colds or flu, wash your hands often. Activity ? · If your doctor recommends it, get more exercise. Walking is a good choice. Bit by bit, increase the amount you walk every day. Try for at least 30 minutes on most days of the week. You also may want to swim, bike, or do other activities. Your doctor may suggest that you join a cardiac rehabilitation program so that you can have help increasing your physical activity safely. ? · Start light exercise if your doctor says it is okay. Even a small amount will help you get stronger, have more energy, and manage stress. Walking is an easy way to get exercise. Start out by walking a little more than you did in the hospital. Gradually increase the amount you walk. ? · When you exercise, watch for signs that your heart is working too hard. You are pushing too hard if you cannot talk while you are exercising. If you become short of breath or dizzy or have chest pain, sit down and rest immediately. ? · Check your pulse regularly. Place two fingers on the artery at the palm side of your wrist, in line with your thumb. If your heartbeat seems uneven or fast, talk to your doctor. When should you call for help? Call 911 anytime you think you may need emergency care. For example, call if: 
? · You have symptoms of a heart attack. These may include: ¨ Chest pain or pressure, or a strange feeling in the chest. 
¨ Sweating. ¨ Shortness of breath. ¨ Nausea or vomiting. ¨ Pain, pressure, or a strange feeling in the back, neck, jaw, or upper belly or in one or both shoulders or arms. ¨ Lightheadedness or sudden weakness. ¨ A fast or irregular heartbeat. After you call 911, the  may tell you to chew 1 adult-strength or 2 to 4 low-dose aspirin. Wait for an ambulance. Do not try to drive yourself. ? · You have symptoms of a stroke. These may include: 
¨ Sudden numbness, tingling, weakness, or loss of movement in your face, arm, or leg, especially on only one side of your body. ¨ Sudden vision changes. ¨ Sudden trouble speaking. ¨ Sudden confusion or trouble understanding simple statements. ¨ Sudden problems with walking or balance. ¨ A sudden, severe headache that is different from past headaches. ? · You passed out (lost consciousness). ?Call your doctor now or seek immediate medical care if: 
? · You have new or increased shortness of breath. ? · You feel dizzy or lightheaded, or you feel like you may faint. ? · Your heart rate becomes irregular. ? · You can feel your heart flutter in your chest or skip heartbeats. Tell your doctor if these symptoms are new or worse. ? Watch closely for changes in your health, and be sure to contact your doctor if you have any problems. Where can you learn more? Go to http://leland-michael.info/. Enter U020 in the search box to learn more about \"Atrial Fibrillation: Care Instructions. \" Current as of: September 21, 2016 Content Version: 11.4 © 0237-9335 [employer]. Care instructions adapted under license by [employer] (which disclaims liability or warranty for this information). If you have questions about a medical condition or this instruction, always ask your healthcare professional. [place] [address] any warranty or liability for your use of this information. [employer] Announcement We are excited to announce that we are making your provider's discharge notes available to you in [employer]. You will see these notes when they are completed and signed by the physician that discharged you from your recent hospital stay. If you have any questions or concerns about any information you see in [employer], please call the Health Information Department where you were seen or reach out to your Primary Care Provider for more information about your plan of care. Introducing Jacques Lester As a [employer] patient, I wanted to make you aware of our electronic visit tool called Jacques Smithnadeem. [employer] 24/7 allows you to connect within minutes with a medical provider 24 hours a day, seven days a week via a mobile device or tablet or logging into a secure website from your computer. You can access Jacques Lester from anywhere in the [place]. A virtual visit might be right for you when you have a simple condition and feel like you just dont want to get out of bed, or cant get away from work for an appointment, when your regular [employer] provider is not available (evenings, weekends or holidays), or when youre out of town and need minor care.   Electronic visits cost only $49 and if the [facility] 24/7 provider determines a prescription is needed to treat your condition, one can be electronically transmitted to a nearby pharmacy*. Please take a moment to enroll today if you have not already done so. The enrollment process is free and takes just a few minutes. To enroll, please download the [employer] 24/7 gurinder to your tablet or phone, or visit [email or web address] to enroll on your computer. And, as an [address] [place] [address] patient with a [employer] account, the results of your visits will be scanned into your electronic medical record and your primary care provider will be able to view the scanned results. We urge you to continue to see your regular [facility] provider for your ongoing medical care. And while your primary care provider may not be the one available when you seek a behaview virtual visit, the peace of mind you get from getting a real diagnosis real time can be priceless. For more information on behaview, view our Frequently Asked Questions (FAQs) at [email or web address]. Sincerely, 
 
Alberto Nugent MD 
Chief Medical Officer [facility] Lizbeth Marr *:  certain medications cannot be prescribed via behaview Unresulted Labs-Please follow up with your PCP about these lab tests Order Current Status DUPLEX LOWER EXT VENOUS BILAT Preliminary result Providers Seen During Your Hospitalization Provider Specialty Primary office phone Iliana Estevez MD Emergency Medicine [phone] Joaquín Briceno MD Emergency Medicine [phone] Abhinav Talbot MD Emergency Medicine [phone] Denisse Morris MD Internal Medicine [phone] Chuyita Langley MD [facility] [phone] Your Primary Care Physician (PCP) Primary Care Physician Office Phone Office Fax [place] [phone] You are allergic to the following No active allergies Recent Documentation Height Weight Breastfeeding? BMI OB Status Smoking Status 1.575 m 94.8 kg No 38.23 kg/m2 Having regular periods Never Smoker Emergency Contacts Name Discharge Info Relation Home Work Mobile Tiny CAREGIVER [3] Child [2] [phone] [address] [place] [address] Av. CAREGIVER [3] Other Relative [6] [phone] Patient Belongings The following personal items are in your possession at time of discharge: 
  Dental Appliances: None  Visual Aid: None      Home Medications: None   Jewelry: Ring  Clothing: At bedside, Footwear, Jacket/Coat, Pants, Shirt, Socks, Undergarments, With patient    Other Valuables: None Please provide this summary of care documentation to your next provider. Signatures-by signing, you are acknowledging that this After Visit Summary has been reviewed with you and you have received a copy. Patient Signature:  ____________________________________________________________ Date:  ____________________________________________________________  
  
Larri Hazard Provider Signature:  ____________________________________________________________ Date:  ____________________________________________________________

## 2018-04-06 NOTE — Clinical Note
Status[de-identified] Inpatient [101] Type of Bed: Medical [8] Inpatient Hospitalization Certified Necessary for the Following Reasons: 3. Patient receiving treatment that can only be provided in an inpatient setting (further clarification in H&P documentation) Comment:   
 Admitting Diagnosis: Incontinence of urine [681086] Admitting Diagnosis: New onset atrial fibrillation ([facility] [5957220] Admitting Physician: Christa Waldron [[id number]] Attending Physician: Christa Waldron [[id number]] Estimated Length of Stay: 2 Midnights Discharge Plan[de-identified] Home with Office Follow-up

## 2018-04-07 ENCOUNTER — APPOINTMENT (OUTPATIENT)
Dept: MRI IMAGING | Age: 58
End: 2018-04-07
Attending: INTERNAL MEDICINE
Payer: COMMERCIAL

## 2018-04-07 VITALS
HEART RATE: 80 BPM | WEIGHT: 209 LBS | HEIGHT: 62 IN | DIASTOLIC BLOOD PRESSURE: 73 MMHG | SYSTOLIC BLOOD PRESSURE: 122 MMHG | OXYGEN SATURATION: 99 % | RESPIRATION RATE: 19 BRPM | TEMPERATURE: 97.8 F | BODY MASS INDEX: 38.46 KG/M2

## 2018-04-07 LAB
ERYTHROCYTE [DISTWIDTH] IN BLOOD BY AUTOMATED COUNT: 13.1 % (ref 11.6–14.5)
HCT VFR BLD AUTO: 39.1 % (ref 35–45)
HGB BLD-MCNC: 13.2 G/DL (ref 12–16)
MCH RBC QN AUTO: 30.1 PG (ref 24–34)
MCHC RBC AUTO-ENTMCNC: 33.8 G/DL (ref 31–37)
MCV RBC AUTO: 89.3 FL (ref 74–97)
PLATELET # BLD AUTO: 196 K/UL (ref 135–420)
PMV BLD AUTO: 10.5 FL (ref 9.2–11.8)
RBC # BLD AUTO: 4.38 M/UL (ref 4.2–5.3)
TROPONIN I SERPL-MCNC: <0.02 NG/ML (ref 0–0.04)
TROPONIN I SERPL-MCNC: <0.02 NG/ML (ref 0–0.04)
WBC # BLD AUTO: 4.6 K/UL (ref 4.6–13.2)

## 2018-04-07 PROCEDURE — 70553 MRI BRAIN STEM W/O & W/DYE: CPT

## 2018-04-07 PROCEDURE — 93970 EXTREMITY STUDY: CPT

## 2018-04-07 PROCEDURE — 85027 COMPLETE CBC AUTOMATED: CPT | Performed by: INTERNAL MEDICINE

## 2018-04-07 PROCEDURE — 74011250636 HC RX REV CODE- 250/636: Performed by: FAMILY MEDICINE

## 2018-04-07 PROCEDURE — 36415 COLL VENOUS BLD VENIPUNCTURE: CPT | Performed by: INTERNAL MEDICINE

## 2018-04-07 PROCEDURE — 99218 HC RM OBSERVATION: CPT

## 2018-04-07 PROCEDURE — 74011250637 HC RX REV CODE- 250/637: Performed by: INTERNAL MEDICINE

## 2018-04-07 PROCEDURE — 72156 MRI NECK SPINE W/O & W/DYE: CPT

## 2018-04-07 PROCEDURE — 84484 ASSAY OF TROPONIN QUANT: CPT | Performed by: INTERNAL MEDICINE

## 2018-04-07 PROCEDURE — A9577 INJ MULTIHANCE: HCPCS | Performed by: FAMILY MEDICINE

## 2018-04-07 RX ORDER — OXYCODONE AND ACETAMINOPHEN 5; 325 MG/1; MG/1
1 TABLET ORAL
Status: DISCONTINUED | OUTPATIENT
Start: 2018-04-07 | End: 2018-04-07 | Stop reason: HOSPADM

## 2018-04-07 RX ORDER — ONDANSETRON 2 MG/ML
4 INJECTION INTRAMUSCULAR; INTRAVENOUS
Status: DISCONTINUED | OUTPATIENT
Start: 2018-04-07 | End: 2018-04-07 | Stop reason: HOSPADM

## 2018-04-07 RX ORDER — ACETAMINOPHEN 325 MG/1
650 TABLET ORAL
Status: DISCONTINUED | OUTPATIENT
Start: 2018-04-07 | End: 2018-04-07 | Stop reason: HOSPADM

## 2018-04-07 RX ORDER — DEXTROMETHORPHAN HYDROBROMIDE, GUAIFENESIN 5; 100 MG/5ML; MG/5ML
650 LIQUID ORAL EVERY 8 HOURS
COMMUNITY
End: 2019-10-17 | Stop reason: ALTCHOICE

## 2018-04-07 RX ORDER — DOCUSATE SODIUM 100 MG/1
100 CAPSULE, LIQUID FILLED ORAL 2 TIMES DAILY
Status: DISCONTINUED | OUTPATIENT
Start: 2018-04-07 | End: 2018-04-07 | Stop reason: HOSPADM

## 2018-04-07 RX ADMIN — DOCUSATE SODIUM 100 MG: 100 CAPSULE, LIQUID FILLED ORAL at 11:46

## 2018-04-07 RX ADMIN — GADOBENATE DIMEGLUMINE 20 ML: 529 INJECTION, SOLUTION INTRAVENOUS at 09:52

## 2018-04-07 RX ADMIN — APIXABAN 5 MG: 5 TABLET, FILM COATED ORAL at 11:46

## 2018-04-07 RX ADMIN — OXYCODONE HYDROCHLORIDE AND ACETAMINOPHEN 1 TABLET: 5; 325 TABLET ORAL at 02:02

## 2018-04-07 NOTE — ROUTINE PROCESS
Bedside and Verbal shift change report given to Landon Sam (oncoming nurse) by Michelle Nath (offgoing nurse). Report included the following information SBAR and Kardex.

## 2018-04-07 NOTE — DISCHARGE INSTRUCTIONS
Patient armband removed and shredded  DISCHARGE SUMMARY from Nurse    PATIENT INSTRUCTIONS:    After general anesthesia or intravenous sedation, for 24 hours or while taking prescription Narcotics:  · Limit your activities  · Do not drive and operate hazardous machinery  · Do not make important personal or business decisions  · Do  not drink alcoholic beverages  · If you have not urinated within 8 hours after discharge, please contact your surgeon on call. Report the following to your surgeon:  · Excessive pain, swelling, redness or odor of or around the surgical area  · Temperature over 100.5  · Nausea and vomiting lasting longer than 4 hours or if unable to take medications  · Any signs of decreased circulation or nerve impairment to extremity: change in color, persistent  numbness, tingling, coldness or increase pain  · Any questions    What to do at Home:  Recommended activity: Activity as tolerated, WBAT    If you experience any of the following symptoms Dizzyness, please follow up with pCP. *  Please give a list of your current medications to your Primary Care Provider. *  Please update this list whenever your medications are discontinued, doses are      changed, or new medications (including over-the-counter products) are added. *  Please carry medication information at all times in case of emergency situations. These are general instructions for a healthy lifestyle:    No smoking/ No tobacco products/ Avoid exposure to second hand smoke  Surgeon General's Warning:  Quitting smoking now greatly reduces serious risk to your health.     Obesity, smoking, and sedentary lifestyle greatly increases your risk for illness    A healthy diet, regular physical exercise & weight monitoring are important for maintaining a healthy lifestyle    You may be retaining fluid if you have a history of heart failure or if you experience any of the following symptoms:  Weight gain of 3 pounds or more overnight or 5 pounds in a week, increased swelling in our hands or feet or shortness of breath while lying flat in bed. Please call your doctor as soon as you notice any of these symptoms; do not wait until your next office visit. Recognize signs and symptoms of STROKE:    F-face looks uneven    A-arms unable to move or move unevenly    S-speech slurred or non-existent    T-time-call 911 as soon as signs and symptoms begin-DO NOT go       Back to bed or wait to see if you get better-TIME IS BRAIN. Warning Signs of HEART ATTACK     Call 911 if you have these symptoms:   Chest discomfort. Most heart attacks involve discomfort in the center of the chest that lasts more than a few minutes, or that goes away and comes back. It can feel like uncomfortable pressure, squeezing, fullness, or pain.  Discomfort in other areas of the upper body. Symptoms can include pain or discomfort in one or both arms, the back, neck, jaw, or stomach.  Shortness of breath with or without chest discomfort.  Other signs may include breaking out in a cold sweat, nausea, or lightheadedness. Don't wait more than five minutes to call 911 - MINUTES MATTER! Fast action can save your life. Calling 911 is almost always the fastest way to get lifesaving treatment. Emergency Medical Services staff can begin treatment when they arrive -- up to an hour sooner than if someone gets to the hospital by car. The discharge information has been reviewed with the patient. The patient verbalized understanding. Discharge medications reviewed with the patient and appropriate educational materials and side effects teaching were provided. ___________________________________________________________________________________________________________________________________     Atrial Fibrillation: Care Instructions  Your Care Instructions    Atrial fibrillation is an irregular and often fast heartbeat. Treating this condition is important for several reasons.  It can cause blood clots, which can travel from your heart to your brain and cause a stroke. If you have a fast heartbeat, you may feel lightheaded, dizzy, and weak. An irregular heartbeat can also increase your risk for heart failure. Atrial fibrillation is often the result of another heart condition, such as high blood pressure or coronary artery disease. Making changes to improve your heart condition will help you stay healthy and active. Follow-up care is a key part of your treatment and safety. Be sure to make and go to all appointments, and call your doctor if you are having problems. It's also a good idea to know your test results and keep a list of the medicines you take. How can you care for yourself at home? Medicines  ? · Take your medicines exactly as prescribed. Call your doctor if you think you are having a problem with your medicine. You will get more details on the specific medicines your doctor prescribes. ? · If your doctor has given you a blood thinner to prevent a stroke, be sure you get instructions about how to take your medicine safely. Blood thinners can cause serious bleeding problems. ? · Do not take any vitamins, over-the-counter drugs, or herbal products without talking to your doctor first.   ? Lifestyle changes  ? · Do not smoke. Smoking can increase your chance of a stroke and heart attack. If you need help quitting, talk to your doctor about stop-smoking programs and medicines. These can increase your chances of quitting for good. ? · Eat a heart-healthy diet. ? · Stay at a healthy weight. Lose weight if you need to.   ? · Limit alcohol to 2 drinks a day for men and 1 drink a day for women. Too much alcohol can cause health problems. ? · Avoid colds and flu. Get a pneumococcal vaccine shot. If you have had one before, ask your doctor whether you need another dose. Get a flu shot every year. If you must be around people with colds or flu, wash your hands often. Activity  ? · If your doctor recommends it, get more exercise. Walking is a good choice. Bit by bit, increase the amount you walk every day. Try for at least 30 minutes on most days of the week. You also may want to swim, bike, or do other activities. Your doctor may suggest that you join a cardiac rehabilitation program so that you can have help increasing your physical activity safely. ? · Start light exercise if your doctor says it is okay. Even a small amount will help you get stronger, have more energy, and manage stress. Walking is an easy way to get exercise. Start out by walking a little more than you did in the hospital. Gradually increase the amount you walk. ? · When you exercise, watch for signs that your heart is working too hard. You are pushing too hard if you cannot talk while you are exercising. If you become short of breath or dizzy or have chest pain, sit down and rest immediately. ? · Check your pulse regularly. Place two fingers on the artery at the palm side of your wrist, in line with your thumb. If your heartbeat seems uneven or fast, talk to your doctor. When should you call for help? Call 911 anytime you think you may need emergency care. For example, call if:  ? · You have symptoms of a heart attack. These may include:  ¨ Chest pain or pressure, or a strange feeling in the chest.  ¨ Sweating. ¨ Shortness of breath. ¨ Nausea or vomiting. ¨ Pain, pressure, or a strange feeling in the back, neck, jaw, or upper belly or in one or both shoulders or arms. ¨ Lightheadedness or sudden weakness. ¨ A fast or irregular heartbeat. After you call 911, the  may tell you to chew 1 adult-strength or 2 to 4 low-dose aspirin. Wait for an ambulance. Do not try to drive yourself. ? · You have symptoms of a stroke. These may include:  ¨ Sudden numbness, tingling, weakness, or loss of movement in your face, arm, or leg, especially on only one side of your body. ¨ Sudden vision changes.   ¨ Sudden trouble speaking. ¨ Sudden confusion or trouble understanding simple statements. ¨ Sudden problems with walking or balance. ¨ A sudden, severe headache that is different from past headaches. ? · You passed out (lost consciousness). ?Call your doctor now or seek immediate medical care if:  ? · You have new or increased shortness of breath. ? · You feel dizzy or lightheaded, or you feel like you may faint. ? · Your heart rate becomes irregular. ? · You can feel your heart flutter in your chest or skip heartbeats. Tell your doctor if these symptoms are new or worse. ? Watch closely for changes in your health, and be sure to contact your doctor if you have any problems. Where can you learn more? Go to http://leland-michael.info/. Enter U020 in the search box to learn more about \"Atrial Fibrillation: Care Instructions. \"  Current as of: September 21, 2016  Content Version: 11.4  © 4303-6654 [employer] Care instructions adapted under license by [employer] (which disclaims liability or warranty for this information). If you have questions about a medical condition or this instruction, always ask your healthcare professional. [place] [address] any warranty or liability for your use of this information.

## 2018-04-07 NOTE — PROCEDURES
[facility]  *** FINAL REPORT ***    Name: Sydney Pereyra  MRN: [id number]    Inpatient  : 1960  HIS Order #: [id number]  [id number] [facility] Visit #: [id number]  Date: 2018    TYPE OF TEST: Peripheral Venous Testing    REASON FOR TEST  Pain in limb, Limb swelling    Right Leg:-  Deep venous thrombosis:           No  Superficial venous thrombosis:    No  Deep venous insufficiency:        Not examined  Superficial venous insufficiency: Not examined    Left Leg:-  Deep venous thrombosis:           No  Superficial venous thrombosis:    No  Deep venous insufficiency:        Not examined  Superficial venous insufficiency: Not examined      INTERPRETATION/FINDINGS  Duplex images were obtained using 2-D gray scale, color flow, and  spectral Doppler analysis. Right leg :  1. No evidence of deep venous thrombosis detected in the veins  visualized. 2. Deep vein(s) visualized include the common femoral, femoral,  popliteal, posterior tibial, and peroneal veins. 3. No evidence of superficial thrombosis detected. 4. Superficial vein(s) visualized include the great saphenous vein at  the sapheno-femoral junction. Left leg :  1. No evidence of deep venous thrombosis detected in the veins  visualized. 2. Deep vein(s) visualized include the common femoral, femoral,  popliteal, posterior tibial, and peroneal veins. 3. No evidence of superficial thrombosis detected. 4. Superficial vein(s) visualized include the great saphenous vein at  the sapheno-femoral junction. ADDITIONAL COMMENTS    I have personally reviewed the data relevant to the interpretation of  this  study. TECHNOLOGIST: Emma Bryant. Yessenia ELIAS  Signed: 2018 02:38 PM    PHYSICIAN: Jignesh Kong D.O.   Signed: 2018 02:38 PM

## 2018-04-07 NOTE — DISCHARGE SUMMARY
Discharge summary dictated. If leg duplex study negative, will d/c home. Changed to STAT study, as d/w nursing. ID#: [id number]. BLE duplex study negative for DVT. Will d/c home. As d/w nursing.    Lina Jordan MD  4/7/2018  3:52 PM

## 2018-04-07 NOTE — H&P
History & Physical    Patient: Nickolas Remy MRN: [id number]  CSN: [id number]    YOB: 1960  Age: 66 y.o. Sex: female      DOA: 4/6/2018    Chief Complaint:   Chief Complaint   Patient presents with    Palpitations          HPI:     Nickolas Remy is a 66 y.o. female with pmh of OA, HTN who works as a nursing assistant was in her normal state of health until she had finished giving 3 baths and had sudden palpitations with chest heaviness. Associated with sob and dizziness. In the ER found to be in Afib. Cardiology consulted and due to her stable nature, patient was to be discharged on eliquis 5mg bid. However, while in the ER she began to have new urinary incontinence, stool incontinence. Rectal tone was checked- loose, Stat MRI lumbar done shows degenerative disease, stenosis, but no area of impingement (see report in imaging). Spine was consulted however they will see patient in the morning, teleneurolgy consulted - did not think this was a surgical emergency and recommended admission with MRI brain and MRI c- spine with contrast.  On interview with the patient she says that she has throbbing right lower extremity calf pain that is new. She denies stool incontinence, says that she couldn't hold it any longer while waiting for the bedpan. The urinary incontinence is new, she has not done that in the past, denies dysuria. Stated \"this morning I was fine\". Currently resting comfortably- no back pain, no neck pain, no weakness of any limbs. No shooting pain down the leg. No fevers, nausea, vomiting.  +bilateral foot numbness which resolved with percocet. Past Medical History:   Diagnosis Date    Arthritis     bl knees        History reviewed. No pertinent surgical history. History reviewed. No pertinent family history.     Social History     Social History    Marital status: SINGLE     Spouse name: N/A    Number of children: N/A    Years of education: N/A     Social History Main Topics    Smoking status: Never Smoker    Smokeless tobacco: None    Alcohol use No    Drug use: No    Sexual activity: Not Asked     Other Topics Concern    None     Social History Narrative       Prior to Admission medications    Medication Sig Start Date End Date Taking? Authorizing Provider   apixaban (ELIQUIS) 5 mg tablet Take 1 Tab by mouth two (2) times a day. 18  Yes SAI Granados   multivitamin (ONE A DAY) tablet Take 1 Tab by mouth daily. Indications: VITAMIN DEFICIENCY PREVENTION    Phys Other, MD       No Known Allergies      Review of Systems  12 point ROS done, negative except as stated above    Physical Exam:     Physical Exam:  Visit Vitals    /84    Pulse 85    Temp 97.7 °F (36.5 °C)    Resp 20    Ht 5' 2\" (1.575 m)    Wt 94.3 kg (208 lb)    SpO2 100%    BMI 38.04 kg/m2      O2 Device: Room air    Temp (24hrs), Av.7 °F (36.5 °C), Min:97.7 °F (36.5 °C), Max:97.7 °F (36.5 °C)             General:  Alert, cooperative, no distress              Head: Normocephalic,atraumatic. Eyes:  Conjunctivae/corneas clear. PERRL, EOMs intact. Nose: Nares normal. No drainage or sinus tenderness. Neck: Supple, symmetrical, trachea midline, no adenopathy, thyroid: no enlargement, no carotid bruit and no JVD. Lungs:   Clear to auscultation bilaterally. Heart:  irregular rate and rhythm, S1, S2 normal.     Abdomen: Soft, non-tender. Bowel sounds normal.    Extremities: Pain on Right calf palpation. atraumatic, no cyanosis or edema. Pulses: 2+ and symmetric all extremities. Skin:  No rashes or lesions   Neurologic: AAOx3, No focal motor or sensory deficit at time of exam. SLR negative bilaterally. No focal back pain on exam. +quad reflex bilaterally wnl.        Labs Reviewed:    CMP:   Lab Results   Component Value Date/Time     2018 09:26 AM    K 4.2 2018 09:26 AM     (H) 2018 09:26 AM    CO2 25 2018 09:26 AM    AGAP 9 04/06/2018 09:26 AM    GLU 82 04/06/2018 09:26 AM    BUN 16 04/06/2018 09:26 AM    CREA 0.82 04/06/2018 09:26 AM    GFRAA >60 04/06/2018 09:26 AM    GFRNA >60 04/06/2018 09:26 AM    CA 9.8 04/06/2018 09:26 AM     CBC:   Lab Results   Component Value Date/Time    WBC 2.9 (L) 04/06/2018 09:26 AM    HGB 13.4 04/06/2018 09:26 AM    HCT 39.0 04/06/2018 09:26 AM     04/06/2018 09:26 AM     All Cardiac Markers in the last 24 hours:   Lab Results   Component Value Date/Time     04/06/2018 12:46 PM     04/06/2018 09:26 AM    CKMB 1.3 04/06/2018 12:46 PM    CKMB 1.8 04/06/2018 09:26 AM    CKND1 0.8 04/06/2018 12:46 PM    CKND1 1.0 04/06/2018 09:26 AM    TROIQ <0.02 04/06/2018 12:46 PM    TROIQ <0.02 04/06/2018 09:26 AM     CXR- mildly prominent pulm vasculature      MRI lumbar spine wo contrast  IMPRESSION:     1. At L4-L5, there is mild to moderate spinal canal stenosis due to combination  of degenerative changes and mild congenital narrowing, and moderate foraminal  stenoses, with probable abutment of the L5 roots at lateral recess, and exiting  left L4 root.     -Lesser degree degenerative changes and stenoses at L3-L4 and L5-S1.     -See level by level details above.     2. Probable large right hepatic lobe cyst incompletely imaged. Slightly  distended gallbladder noted.   Procedures/imaging: see electronic medical records for all procedures/Xrays and details which were not copied into this note but were reviewed prior to creation of Plan      Assessment/Plan     New onset atrial fibrillation  New onset urinary incontinence  RLE calf pain/swelling  Paresthesia of b/l feet  HTN  OA  leukopenia    Dr. Stephen Gracia was consulted and he recommended eliquis 5mg bid and would see the patient in his office next week, however with patients new symptoms of urine stool incontinence, the patient will be admitted  CHADS- 1, cardiology consulted by ER  Will continue 5mg eliquis bid as per cardio, currently patient does not require betablocker/CC blocker  Doppler RLE  MRI lumbar did not show any impingement, Spine consult notified by ER to assess in AM. Not true stool incontinence. Tele neuro- rec MRI brain and C spine with contrast for AM  F/u CBC in AM- ?leukopenia        DVT/GI Prophylaxis: pt on eliquis    Discussed with patient and  at bedside about hospital admission and my plan care, both understood and agree with my plan care.     Carlos Olson MD  4/6/2018 9:53 PM      neurosurg was called informally by hospitalist- if patient gets discharged outpatient appt available for neurosurg- [phone]

## 2018-04-07 NOTE — PROGRESS NOTES
Telephone discussion re: current patient accomodation this admission. Recommended Tele  R/t new onset Afib, although resolved. vs Medical to [facility]. Pending new admission for Tele monitoring.

## 2018-04-07 NOTE — ROUTINE PROCESS
To whom it may concern:    Please note that Marikay Mcardle has been under my care in the hospital from 4/6/18 through 4/7/18. She may return to work on 4/9/18.     Arcenio Brand MD  4/7/2018  3:52 PM

## 2018-04-07 NOTE — ROUTINE PROCESS
Primary Nurse Humera Conti RN and Vira RN performed a dual skin assessment on this patient No impairment noted  Garret score is 13

## 2018-04-07 NOTE — CONSULTS
[address]  MR#: [id number]  : 1960  ACCOUNT #: [de-identified]   DATE OF SERVICE: 2018    HISTORY OF PRESENT ILLNESS:  This is a pleasant 65-year-old female for whom I was consulted on for urinary incontinence. This all happened yesterday last evening in the Emergency Department. She was worked up by the emergency room physician. An MRI of her lumbar spine was ordered, which was found to be negative for any significant stenosis or neural impingement. Nonetheless, I was consulted because she had this episode of urinary incontinence. She does endorse pain in the right leg. On questioning for me this morning, she is a pretty good historian. She tells me that she, as of yesterday, started to have some pain and paresthesias in the right leg. However, that all seems to have improved significantly overnight. She also tells me that she had the urinary incontinence in the emergency room, but states that now she can urinate just fine without any problems whatsoever. I spoke with the nurse taking care of the patient, who also tells me that the patient is urinating without difficulty at this point in time. PHYSICAL EXAMINATION:  On exam for me, she was alert and oriented x3, in no apparent distress. She has got good 5/5 strength in the upper and lower extremities. Deep tendon reflexes were symmetric, within normal limits. She has got a very, very mildly positive straight leg raise sign on the right side. ASSESSMENT AND PLAN:  Her MRI findings seem to be relatively benign in so far as any type of stenosis is concerned. I certainly would not recommend any type of surgical intervention. She seems to be doing quite well and seems to be improved at this point in time. I would recommend mobilize as tolerated and follow up with Dr. Damon Simmonds on an outpatient as needed. Please call me with any questions or concerns.   My phone number is [phone].       MD AYDIN Uribe / Myrtle Mcwilliams  D: 04/07/2018 08:31     T: 04/07/2018 09:12  JOB #: [id number]

## 2018-04-07 NOTE — ROUTINE PROCESS
TRANSFER - IN REPORT:    Verbal report received from Avani(name) on [facility]  being received from ER(unit) for routine progression of care      Report consisted of patients Situation, Background, Assessment and   Recommendations(SBAR). Information from the following report(s) SBAR and Kardex was reviewed with the receiving nurse. Opportunity for questions and clarification was provided. Assessment completed upon patients arrival to unit and care assumed.

## 2018-04-07 NOTE — DISCHARGE SUMMARY
[address] SUMMARY    Name:Tamika ESTRADA  MR#: [id number]  : 1960  ACCOUNT #: [de-identified]   ADMIT DATE: 2018  DISCHARGE DATE: 2018    DISCHARGE DIAGNOSES:  1.  New onset atrial fibrillation. 2.  New onset urinary incontinence. 3.  Right lower extremity calf pain. 4.  She has a history of hypertension and osteoarthritis. SERVICE:  The patient was admitted to the hospitalist service. CONSULTATIONS:  Dr. Emelia Randolph was consulted for spine surgery. HISTORY OF PRESENT ILLNESS:  Please refer to the admission H and P.      Briefly, the patient is a 82-year-old female with a history significant for arthritis and hypertension, who came to the emergency department with complaints of some palpitations and heaviness of her chest.  She had some shortness of breath and some dizziness. In the ER, she was found to have some atrial fibrillation. Cardiology was consulted at that time. She was discharged on 5 mg of Eliquis twice daily. However, while in the emergency department, she began to have new urinary incontinence and stool incontinence. Rectal tone was checked, it was loose. She had a stat lumbar MRI performed, demonstrating the following: At L4-L5, there is mild to moderate spinal canal stenosis due to combination of degenerative changes, mild congenital narrowing and moderate foraminal stenoses with probable abutment of the L5 roots at the lateral recess and exiting L4 root. Lesser degree degenerative changes and stenosis at L3-L4 and L5-S1. Probable large right hepatic lobe cyst incompletely imaged, slightly distended gallbladder noted. For this reason, she was referred to our service. She denied, when we saw her, any stool incontinence, stating that she just could not hold it any longer while waiting for a bedpan.   She does, however, report that the urinary incontinence is new, and she has not had this before in the past, did not have any dysuria. When we saw her, vital signs were stable and normal with a pressure 144/84. She was in no distress, had an irregularly irregular heart, clear lungs, benign abdomen and no edema. She did have some right calf tenderness. Metabolic panel normal, CBC normal, with a white count of 2.9. HOSPITAL COURSE BY PROBLEM:  1.  New onset atrial fibrillation:  Rate controlled. Evaluated by Cardiology in the emergency department. Started on Eliquis, to follow up with cardiology on an outpatient basis. Does not require any rate controlling agents at the current time. 2.  New onset of urinary incontinence:  Again, on my examination, the patient denies any bowel incontinence. She states that her incontinence has resolved this morning. She is seen with family member present at bedside. Seen by Spine Surgery. She had additional MR studies performed, MRI of the brain showed nothing clearly acute. Mildly extensive nonenhancing white matter lesions, mostly peripheral at the cerebrum, also cerebral periventricular and single posterior fossa lesion. Nonspecific. Atypical for chronic ischemic small vessel disease. Multiple sclerosis is possible. She had an MRI of her cervical spine, showing cervical cord intrinsically normal, no cord lesions. Moderate DDD and DJD, central spinal stenosis, most advanced, moderate, C5-C6 with annular fissures at disks, which can cause neck pain. Degenerative foraminal narrowing. A 0.6 cm bone marrow lesion at C6 vertebral body, ring-like increased ___ signal nonspecific. This is very likely benign unless the patient has special risk factors for bone marrow malignancy. Osteoid and osteo unlikely. No intervention indicated by Surgery, doing well otherwise. Outpatient followup. 2.  Right lower extremity calf pain:  Duplex study ordered, pending at the current time. If negative, we will proceed with discharge home.   3.  She has a history of hypertension and osteoarthritis. On examination today, vital signs are stable and normal, pressures are normal.    The patient denies any fever, chills, nausea, vomiting, chest pain, shortness of breath, palpitation or edema. No abdominal pain. No difficulty ambulating. No recurrent incontinence. She was seen with her son present at bedside. She has, on my examination, irregular heart, clear lungs, benign abdomen, no calf tenderness or edema. She has a CBC this morning, which is within normal limits. Right lower extremity duplex study pending at the current time. DISPOSITION:  Plan to discharge home. This is as long as her duplex study is negative. FOLLOWUP:   1. With her PCP, Dr. Alvin Duncan  in 7-10 days. 2.  With Dr. Miguel A Martinez in 3-4 weeks.       Edra Buerger, MD       PDP/EDUIN  D: 04/07/2018 13:29     T: 04/07/2018 14:06  JOB #: [id number]

## 2018-04-12 ENCOUNTER — OFFICE VISIT (OUTPATIENT)
Dept: CARDIOLOGY CLINIC | Age: 58
End: 2018-04-12

## 2018-04-12 VITALS
BODY MASS INDEX: 39.56 KG/M2 | HEIGHT: 62 IN | WEIGHT: 215 LBS | OXYGEN SATURATION: 97 % | DIASTOLIC BLOOD PRESSURE: 74 MMHG | HEART RATE: 62 BPM | SYSTOLIC BLOOD PRESSURE: 110 MMHG

## 2018-04-12 DIAGNOSIS — I48.91 NEW ONSET ATRIAL FIBRILLATION (HCC): Primary | ICD-10-CM

## 2018-04-12 RX ORDER — MECLIZINE HYDROCHLORIDE 25 MG/1
TABLET ORAL
COMMUNITY

## 2018-04-12 RX ORDER — AMLODIPINE BESYLATE 5 MG/1
5 TABLET ORAL DAILY
COMMUNITY
End: 2021-02-03 | Stop reason: SDUPTHER

## 2018-04-12 NOTE — MR AVS SNAPSHOT
[address] [facility] [address] [address] [id number] [phone] Patient: [facility] MRN: [id number] :1960 Visit Information Date & Time Provider Department Dept. Phone Encounter #  
 2018  9:00 AM Bryce Cameron MD [facility] [address] [id number] Upcoming Health Maintenance Date Due Hepatitis C Screening 1960 DTaP/Tdap/Td series (1 - Tdap) 1983 PAP AKA CERVICAL CYTOLOGY 1983 FOBT Q 1 YEAR AGE 50-75 2012 BREAST CANCER SCRN MAMMOGRAM 2019 Allergies as of 2018  Review Complete On: 2018 By: Jet Kan RN No Known Allergies Current Immunizations  Reviewed on 2018 Name Date Influenza Vaccine 2017 Not reviewed this visit You Were Diagnosed With   
  
 Codes Comments [facility] ([facility]    -  Primary ICD-10-CM: I48.91 
ICD-9-CM: 427.31 Vitals BP Pulse Height(growth percentile) Weight(growth percentile) SpO2 BMI  
 110/74 62 5' 2\" (1.575 m) 215 lb (97.5 kg) 97% 39.32 kg/m2 OB Status Smoking Status Having regular periods Never Smoker Vitals History BMI and BSA Data Body Mass Index Body Surface Area  
 39.32 kg/m 2 2.07 m 2 Preferred Pharmacy Pharmacy Name Phone [facility] [address] [facility], [address] [place] [address],[address] [place] [address] [phone] Your Updated Medication List  
  
   
This list is accurate as of 18  9:24 AM.  Always use your most recent med list.  
  
  
  
  
 apixaban 5 mg tablet Commonly known as:  [place] Take 1 Tab by mouth two (2) times a day. meclizine 25 mg tablet Commonly known as:  ANTIVERT Take  by mouth three (3) times daily as needed. multivitamin tablet Commonly known as:  ONE A DAY Take 1 Tab by mouth daily.  Indications: VITAMIN DEFICIENCY PREVENTION  
  
 NORVASC 5 mg tablet Generic drug:  amLODIPine Take 5 mg by mouth daily. TYLENOL ARTHRITIS PAIN 650 mg Shanell Yvesis Generic drug:  acetaminophen Take 650 mg by mouth every eight (8) hours. Indications: Arthritic Pain We Performed the Following AMB POC EKG ROUTINE W/ 12 LEADS, INTER & REP [71210 CPT(R)] To-Do List   
 04/12/2018 ECHO:  2D ECHO COMPLETE ADULT (TTE) W OR WO CONTR   
  
 04/12/2018 ECG:  CARDIAC SPECT REST AND STRESS   
  
 04/12/2018 ECG:  NUCLEAR STRESS TEST Referral Information Referral ID Referred By Referred To  
  
 [id number] [facility] Not Available Visits Status Start Date End Date 1 New Request 4/12/18 4/12/19 If your referral has a status of pending review or denied, additional information will be sent to support the outcome of this decision. Referral ID Referred By Referred To  
 [id number] [facility] Visits Status Start Date End Date 1 New Request 4/12/18 4/12/19 If your referral has a status of pending review or denied, additional information will be sent to support the outcome of this decision. Please provide this summary of care documentation to your next provider. Your primary care clinician is listed as Aron Lyon. If you have any questions after today's visit, please call [phone].

## 2018-04-12 NOTE — PROGRESS NOTES
1. Have you been to the ER, urgent care clinic since your last visit? Hospitalized since your last visit? No     2. Have you seen or consulted any other health care providers outside of the [address] [place] [address] since your last visit? Include any pap smears or colon screening.  No

## 2018-04-12 NOTE — PROGRESS NOTES
History of Present Illness:  A 66 y.o. female referred from the emergency department for new atrial fibrillation. She presented to the emergency department with new palpitations and no chest pain. She has noted some dyspnea from time to time. She has not had any syncope. She was given a prescription for Eliquis but did not get it filled. While in the emergency department, she also had some urinary incontinence and was admitted, monitored, and had a MRI scan which showed mild to moderate spinal canal stenosis. She was seen by surgery and there is no planned intervention. Her incontinence has improved and her palpitations have resolved. She never had any palpitations before this time. She has no history of congestive heart failure, diabetes mellitus, stroke, coronary artery disease. There is a family history of some sort of heart disease in her brother, but she is unsure of the details. She is on medications for blood pressure for two years. Her functional status is limited due to a sedentary lifestyle. Impression/Plan:   New onset atrial fibrillation with reasonable rates as confirmed by EKG. This appears to be the first episode based on symptoms. Initiation of Eliquis last week, however, not started. I discussed side effects and risks today. Her [id number] score is 2. I discussed risks, benefits and alternatives to anticoagulation and recommended Eliquis. Longstanding hypertension on medications for two years. Family history of heart disease of unknown type in her brother. Mild to moderate spinal stenosis seen by surgery and no further intervention. Exertional dyspnea. Given her risk factors I am going to obtain an echocardiogram as well as pharmacologic cardiac nuclear stress test. I have recommended Eliquis at this time. For now, she has had no recurrence. I discussed this could be a chronic issue and can recur at any time.  I will plan to have her see Tamir Shrestha or one of the advanced practitioners within the next 3-4 weeks. I will see her back in 6 months. If she continues to have more episodes, I would proceed with a 30-day event monitor and get an idea of the overall burden and start her on an AV blocking agent. Depending on the results of testing, consider ablation or antiarrhythmic. All questions answered. Atrial fibrillation CHADSVASC2 score stroke risk:   66 y.o.                                        <65        + 0   female                                         Female +1  CHF hx                                           No    + 0  HTN hx                                           Yes    +1  Stroke/TIA/Thromboembolism       No    +0  Vascular disease hx                       No    + 0  Diabetes Mellitus                            No    + 0   CHADSVASC2 score                    2      Annual Stroke Risk 2.2%- moderate-high       Past Medical History:   Diagnosis Date    Arthritis     bl knees        Current Outpatient Prescriptions   Medication Sig Dispense Refill    meclizine (ANTIVERT) 25 mg tablet Take  by mouth three (3) times daily as needed.  amLODIPine (NORVASC) 5 mg tablet Take 5 mg by mouth daily.  acetaminophen (TYLENOL ARTHRITIS PAIN) 650 mg TbER Take 650 mg by mouth every eight (8) hours. Indications: Arthritic Pain      apixaban (ELIQUIS) 5 mg tablet Take 1 Tab by mouth two (2) times a day. 30 Tab 0    multivitamin (ONE A DAY) tablet Take 1 Tab by mouth daily. Indications: VITAMIN DEFICIENCY PREVENTION         Social History   reports that she has never smoked. She does not have any smokeless tobacco history on file. reports that she does not drink alcohol. Family History  family history is not on file. Review of Systems  Except as stated above include:  Constitutional: Negative for fever, chills and malaise/fatigue. HEENT: No congestion or recent URI. Gastrointestinal: No nausea, vomiting, abdominal pain, bloody stools.   Pulmonary:  Negative except as stated above.  Cardiac:  Negative except as stated above. Musculoskeletal: Negative except as stated above. Neurological:  No localized symptoms. Skin:  Negative except as stated above. Psych:  Negative except as stated above. Endocrine:  Negative except as stated above. PHYSICAL EXAM  BP Readings from Last 3 Encounters:   04/12/18 110/74   04/07/18 122/73   12/14/16 107/74     Pulse Readings from Last 3 Encounters:   04/12/18 62   04/07/18 80   12/14/16 66     Wt Readings from Last 3 Encounters:   04/12/18 97.5 kg (215 lb)   04/07/18 94.8 kg (209 lb)   12/14/16 91.6 kg (202 lb)     General:   Well developed, well groomed. Head/Neck:   No jugular venous distention     No carotid bruits. No evidence of xanthelasma. Lungs:   No respiratory distress. Clear bilaterally. Heart:    Regular rate and rhythm. Normal S1/S2. Palpation of heart with normal point of maximum impulse. No significant murmurs, rubs or gallops. Abdomen:   Soft and nontender. No palpable abdominal mass or bruits. Extremities:   Intact peripheral pulses. No significant edema. Neurological:   Alert and oriented to person, place, time. No focal neurological deficit visually.   Skin:   No obvious rash    Blood Pressure Metric:  controlled

## 2018-04-25 ENCOUNTER — HOSPITAL ENCOUNTER (OUTPATIENT)
Dept: NON INVASIVE DIAGNOSTICS | Age: 58
Discharge: HOME | End: 2018-04-25
Attending: INTERNAL MEDICINE
Payer: COMMERCIAL

## 2018-04-25 DIAGNOSIS — I48.91 NEW ONSET ATRIAL FIBRILLATION (HCC): ICD-10-CM

## 2018-04-25 PROCEDURE — A9500 TC99M SESTAMIBI: HCPCS

## 2018-04-25 PROCEDURE — 93306 TTE W/DOPPLER COMPLETE: CPT

## 2018-04-25 PROCEDURE — 78452 HT MUSCLE IMAGE SPECT MULT: CPT | Performed by: INTERNAL MEDICINE

## 2018-04-25 PROCEDURE — 74011250636 HC RX REV CODE- 250/636: Performed by: INTERNAL MEDICINE

## 2018-04-25 PROCEDURE — 93017 CV STRESS TEST TRACING ONLY: CPT | Performed by: INTERNAL MEDICINE

## 2018-04-25 RX ORDER — SODIUM CHLORIDE 9 MG/ML
250 INJECTION, SOLUTION INTRAVENOUS ONCE
Status: COMPLETED | OUTPATIENT
Start: 2018-04-25 | End: 2018-04-25

## 2018-04-25 RX ADMIN — REGADENOSON 0.4 MG: 0.08 INJECTION, SOLUTION INTRAVENOUS at 10:10

## 2018-04-25 RX ADMIN — SODIUM CHLORIDE 250 ML/HR: 900 INJECTION, SOLUTION INTRAVENOUS at 10:00

## 2018-04-25 NOTE — PROGRESS NOTES
Patient was given 10.81 mCi of Sestamibi for the Resting pictures. Patient received 0.4 mg of Lexiscan for the exercise portion of the Stress test. Patient was then given 33 mCi of Sestamibi for the Stress pictures. Armband was removed and disposed of before the patient left.

## 2018-04-25 NOTE — PROCEDURES
[address] [facility] Shayy Alonzo  MR#: [id number]  : 1960  ACCOUNT #: [de-identified]   DATE OF SERVICE: 2018    REFERRING PHYSICIAN:  Dr. Mercedes Goodwin:  Chest pain, atrial fibrillation. FINDINGS:  Resting heart rate 57, blood pressure 127/74. Resting EKG shows sinus bradycardia, no ST or T-wave changes, otherwise normal EKG. PHARMACOLOGIC STRESS PORTION:  The patient underwent Lexiscan infusion 0.4 mg intravenously per protocol via the left antecubital IV and then swung her legs. The patient remained in sinus rhythm with no ST segment change, making this a negative EKG portion of the stress test.    PERFUSION IMAGING:  The patient received intravenous technetium-99m-sestamibi 10.8 mCi intravenously per protocol via the left antecubital IV for resting images and then 32.0 mCi via the same IV an hour later for stress images. Tomographic views of the left ventricle were obtained and compared. Cavity size is normal during both rest and stress images. There was no transient ischemic dilatation present. There was fairly uniform radiotracer uptake throughout the left ventricle myocardium during both stress and rest imaging. There were no perfusion image abnormalities concerning for ischemia or infarction. Gated analysis demonstrates normal LV size, normal systolic function, normal regional wall motion. Ejection fraction calculated at 67%. CONCLUSIONS:  1. Normal and low risk pharmacological nuclear stress test.  2.  No perfusion imaging abnormalities concerning for ischemia or infarction. 3.  Normal left ventricular size, normal systolic function, ejection fraction calculated at 67%.       MD RAMON Courtney / EDUIN  D: 2018 11:13     T: 2018 15:26  JOB #: [id number]  CC: Adelia Rashid MD  CC: Luann Pride MD

## 2018-05-01 ENCOUNTER — HOSPITAL ENCOUNTER (OUTPATIENT)
Dept: LAB | Age: 58
Discharge: HOME | End: 2018-05-01
Payer: COMMERCIAL

## 2018-05-01 LAB
ALBUMIN SERPL-MCNC: 3.8 G/DL (ref 3.4–5)
ALBUMIN/GLOB SERPL: 1.1 {RATIO} (ref 0.8–1.7)
ALP SERPL-CCNC: 88 U/L (ref 45–117)
ALT SERPL-CCNC: 35 U/L (ref 13–56)
ANION GAP SERPL CALC-SCNC: 8 MMOL/L (ref 3–18)
AST SERPL-CCNC: 22 U/L (ref 15–37)
BASOPHILS # BLD: 0 K/UL (ref 0–0.06)
BASOPHILS NFR BLD: 1 % (ref 0–2)
BILIRUB SERPL-MCNC: 0.7 MG/DL (ref 0.2–1)
BUN SERPL-MCNC: 19 MG/DL (ref 7–18)
BUN/CREAT SERPL: 24 (ref 12–20)
CALCIUM SERPL-MCNC: 9.1 MG/DL (ref 8.5–10.1)
CHLORIDE SERPL-SCNC: 106 MMOL/L (ref 100–108)
CHOLEST SERPL-MCNC: 175 MG/DL
CO2 SERPL-SCNC: 27 MMOL/L (ref 21–32)
CREAT SERPL-MCNC: 0.79 MG/DL (ref 0.6–1.3)
DIFFERENTIAL METHOD BLD: ABNORMAL
EOSINOPHIL # BLD: 0.2 K/UL (ref 0–0.4)
EOSINOPHIL NFR BLD: 6 % (ref 0–5)
ERYTHROCYTE [DISTWIDTH] IN BLOOD BY AUTOMATED COUNT: 13 % (ref 11.6–14.5)
GLOBULIN SER CALC-MCNC: 3.4 G/DL (ref 2–4)
GLUCOSE SERPL-MCNC: 76 MG/DL (ref 74–99)
HCT VFR BLD AUTO: 37.9 % (ref 35–45)
HDLC SERPL-MCNC: 69 MG/DL (ref 40–60)
HDLC SERPL: 2.5 {RATIO} (ref 0–5)
HGB BLD-MCNC: 12.8 G/DL (ref 12–16)
LDLC SERPL CALC-MCNC: 100.4 MG/DL (ref 0–100)
LIPID PROFILE,FLP: ABNORMAL
LYMPHOCYTES # BLD: 1.4 K/UL (ref 0.9–3.6)
LYMPHOCYTES NFR BLD: 48 % (ref 21–52)
MCH RBC QN AUTO: 30.5 PG (ref 24–34)
MCHC RBC AUTO-ENTMCNC: 33.8 G/DL (ref 31–37)
MCV RBC AUTO: 90.2 FL (ref 74–97)
MONOCYTES # BLD: 0.3 K/UL (ref 0.05–1.2)
MONOCYTES NFR BLD: 9 % (ref 3–10)
NEUTS SEG # BLD: 1.1 K/UL (ref 1.8–8)
NEUTS SEG NFR BLD: 36 % (ref 40–73)
PLATELET # BLD AUTO: 169 K/UL (ref 135–420)
PMV BLD AUTO: 10.5 FL (ref 9.2–11.8)
POTASSIUM SERPL-SCNC: 4.3 MMOL/L (ref 3.5–5.5)
PROT SERPL-MCNC: 7.2 G/DL (ref 6.4–8.2)
RBC # BLD AUTO: 4.2 M/UL (ref 4.2–5.3)
SODIUM SERPL-SCNC: 141 MMOL/L (ref 136–145)
T4 SERPL-MCNC: 12.2 UG/DL (ref 4.7–13.3)
TRIGL SERPL-MCNC: 28 MG/DL (ref ?–150)
TSH SERPL DL<=0.05 MIU/L-ACNC: 2.44 UIU/ML (ref 0.36–3.74)
VLDLC SERPL CALC-MCNC: 5.6 MG/DL
WBC # BLD AUTO: 3 K/UL (ref 4.6–13.2)

## 2018-05-01 PROCEDURE — 84443 ASSAY THYROID STIM HORMONE: CPT | Performed by: INTERNAL MEDICINE

## 2018-05-01 PROCEDURE — 80061 LIPID PANEL: CPT | Performed by: INTERNAL MEDICINE

## 2018-05-01 PROCEDURE — 84436 ASSAY OF TOTAL THYROXINE: CPT | Performed by: INTERNAL MEDICINE

## 2018-05-01 PROCEDURE — 36415 COLL VENOUS BLD VENIPUNCTURE: CPT | Performed by: INTERNAL MEDICINE

## 2018-05-01 PROCEDURE — 80053 COMPREHEN METABOLIC PANEL: CPT | Performed by: INTERNAL MEDICINE

## 2018-05-01 PROCEDURE — 85025 COMPLETE CBC W/AUTO DIFF WBC: CPT | Performed by: INTERNAL MEDICINE

## 2018-05-01 NOTE — PROCEDURES
[address] Reyes Coach  MR#: [id number]  : 1960  ACCOUNT #: [de-identified]   DATE OF SERVICE: 2018    REFERRING PHYSICIAN:  Dr. Astrid Pitt:  Chest pain, atrial fibrillation. FINDINGS:  Resting heart rate 57, blood pressure 127/74. Resting EKG shows sinus bradycardia, no ST or T-wave changes, otherwise normal EKG. PHARMACOLOGIC STRESS PORTION:  The patient underwent Lexiscan infusion 0.4 mg intravenously per protocol via the left antecubital IV and then swung her legs. The patient remained in sinus rhythm with no ST segment change, making this a negative EKG portion of the stress test.    PERFUSION IMAGING:  The patient received intravenous technetium-99m-sestamibi 10.8 mCi intravenously per protocol via the left antecubital IV for resting images and then 32.0 mCi via the same IV an hour later for stress images. Tomographic views of the left ventricle were obtained and compared. Cavity size is normal during both rest and stress images. There was no transient ischemic dilatation present. There was fairly uniform radiotracer uptake throughout the left ventricle myocardium during both stress and rest imaging. There were no perfusion image abnormalities concerning for ischemia or infarction. Gated analysis demonstrates normal LV size, normal systolic function, normal regional wall motion. Ejection fraction calculated at 67%. CONCLUSIONS:  1. Normal and low risk pharmacological nuclear stress test.  2.  No perfusion imaging abnormalities concerning for ischemia or infarction. 3.  Normal left ventricular size, normal systolic function, ejection fraction calculated at 67%.       MD RAMON Guevara / EDUIN  D: 2018 11:13     T: 2018 15:26  JOB #: [id number]  CC: Alicia Dyson MD  CC: Dee Dee Singleton MD

## 2018-05-02 LAB
ATTENDING PHYSICIAN, CST07: NORMAL
DIAGNOSIS, 93000: NORMAL
DUKE TM SCORE RESULT, CST14: NORMAL
DUKE TREADMILL SCORE, CST13: NORMAL
ECG INTERP BEFORE EX, CST11: NORMAL
ECG INTERP DURING EX, CST12: NORMAL
FUNCTIONAL CAPACITY, CST17: NORMAL
KNOWN CARDIAC CONDITION, CST08: NORMAL
MAX. DIASTOLIC BP, CST04: 79 MMHG
MAX. HEART RATE, CST05: 113 BPM
MAX. SYSTOLIC BP, CST03: 134 MMHG
OVERALL BP RESPONSE TO EXERCISE, CST16: NORMAL
OVERALL HR RESPONSE TO EXERCISE, CST15: NORMAL
PEAK EX METS, CST10: 1 METS
PROTOCOL NAME, CST01: NORMAL
TEST INDICATION, CST09: NORMAL

## 2018-07-12 ENCOUNTER — OFFICE VISIT (OUTPATIENT)
Dept: CARDIOLOGY CLINIC | Age: 58
End: 2018-07-12

## 2018-07-12 VITALS
OXYGEN SATURATION: 98 % | SYSTOLIC BLOOD PRESSURE: 120 MMHG | HEIGHT: 62 IN | DIASTOLIC BLOOD PRESSURE: 88 MMHG | BODY MASS INDEX: 41.59 KG/M2 | HEART RATE: 61 BPM | WEIGHT: 226 LBS

## 2018-07-12 DIAGNOSIS — I48.91 NEW ONSET ATRIAL FIBRILLATION (HCC): Primary | ICD-10-CM

## 2018-07-12 DIAGNOSIS — I10 ESSENTIAL HYPERTENSION: ICD-10-CM

## 2018-07-12 PROBLEM — E66.01 SEVERE OBESITY (BMI 35.0-39.9): Status: ACTIVE | Noted: 2018-07-12

## 2018-07-12 RX ORDER — CHOLECALCIFEROL (VITAMIN D3) 125 MCG
CAPSULE ORAL DAILY
COMMUNITY

## 2018-07-12 NOTE — PROGRESS NOTES
1. Have you been to the ER, urgent care clinic since your last visit? Hospitalized since your last visit? No    2. Have you seen or consulted any other health care providers outside of the [address] [place] [address] since your last visit? Include any pap smears or colon screening.  No

## 2018-07-12 NOTE — PROGRESS NOTES
Simona Calvin presents today for three-month follow-up. She was last seen by Dr. Pipe Oliveira on April 12, 2018. She is a 72-year-old -American female with history of hypertension, atrial fibrillation, and mild to moderate spinal stenosis. Her last echocardiogram was done in April 2018 it showed ejection fraction of 55-60% and no wall motion abnormalities. She had a pharmacologic nuclear stress test done in April 2018 and it was considered normal and low risk. There were no perfusion imaging abnormalities concerning for ischemia or infarct. Overall, she states that she has been feeling well. She mentioned experiencing some chest pain in the center of her chest this past Tuesday, she believes it is her reflux. It does not radiate and not accompanied by any other symptoms. She has taken Rolaids. Denies chest pain, tightness, heaviness, and admits to rare palpitations. Denies shortness of breath at rest, admits to mild dyspnea on exertion, denies orthopnea and PND. Denies abdominal bloating. Denies lightheadedness, dizziness, and syncope. Admits to mild lower extremity edema by the end of the day, admits to varicosities in her legs and denies claudication. Denies nausea, vomiting, diarrhea, melena, hematochezia. Denies hematuria, urgency, frequency. Denies fever, chills. She states that she sees her primary care physician every 2 months and he is managing her hypertension. PMH:  Past Medical History:   Diagnosis Date    Arthritis     bl knees        PSH:  History reviewed. No pertinent surgical history. MEDS:  Current Outpatient Prescriptions   Medication Sig    cholecalciferol, vitamin D3, (VITAMIN D3) 2,000 unit tab Take  by mouth daily.  meclizine (ANTIVERT) 25 mg tablet Take  by mouth three (3) times daily as needed.  amLODIPine (NORVASC) 5 mg tablet Take 5 mg by mouth daily.  apixaban (ELIQUIS) 5 mg tablet Take 1 Tab by mouth two (2) times a day.     acetaminophen (TYLENOL ARTHRITIS PAIN) 650 mg TbER Take 650 mg by mouth every eight (8) hours. Indications: Arthritic Pain     No current facility-administered medications for this visit. Allergies and Sensitivities:  No Known Allergies    Family History:  History reviewed. No pertinent family history. Social History:  She  reports that she has never smoked. She has never used smokeless tobacco.  She  reports that she does not drink alcohol. Physical:  Visit Vitals    /88    Pulse 61    Ht 5' 2\" (1.575 m)    Wt 102.5 kg (226 lb)    SpO2 98%    BMI 41.34 kg/m2           Exam:  Neck:  Supple, no JVD, no carotid bruits  CV:  Normal S1 and  S2, no murmurs, rubs, or gallops noted  Lungs:  Clear to ausculation throughout, no wheezes or rales  Abd:  Soft, non-tender, non-distended with good bowel sounds. No hepatosplenomegaly  Extremities:  No edema      Data:  EKG:    Read by Dion Lux, DO - Normal sinus rhythm, rate 61.  Normal EKG. LABS:  Lab Results   Component Value Date/Time    Sodium 141 05/01/2018 07:28 AM    Potassium 4.3 05/01/2018 07:28 AM    Chloride 106 05/01/2018 07:28 AM    CO2 27 05/01/2018 07:28 AM    Glucose 76 05/01/2018 07:28 AM    BUN 19 (H) 05/01/2018 07:28 AM    Creatinine 0.79 05/01/2018 07:28 AM     Lab Results   Component Value Date/Time    Cholesterol, total 175 05/01/2018 07:28 AM    HDL Cholesterol 69 (H) 05/01/2018 07:28 AM    LDL, calculated 100.4 (H) 05/01/2018 07:28 AM    Triglyceride 28 05/01/2018 07:28 AM    CHOL/HDL Ratio 2.5 05/01/2018 07:28 AM     Lab Results   Component Value Date/Time    ALT (SGPT) 35 05/01/2018 07:28 AM         Impression/Plan:  1. Paroxysmal atrial fibrillation, currently maintaining sinus rhythm, anticoagulated with Eliquis  2. Essential hypertension, blood pressure slightly elevated  3.   Mild to moderate spinal stenosis, no surgical interventions planned at this time    Ms. Marlee Valera was seen today for a 3 month check-up. She states that she has been feeling well. She admits to rare palpitations that lasts for just a second or two. She admits to experiencing occasional chest discomfort in the center of her chest which she believes is her reflux. The chest pain is not accompanied by any other symptoms and does not radiate. She takes Rolaids or Tums. Her blood pressure is slightly elevated but she states that she normally takes her blood pressure medications at night. She reports that she follows up with her primary care physician every 2 months and she does have an appointment with him at the end of the month. Will defer further blood pressure management to primary care at this time. Her breath sounds are clear and she does not exhibit any signs of volume overload. Results of her stress test and echocardiogram that were done in April 2018 were discussed with her. Her stress test was normal and low risk. There were no perfusion imaging abnormalities concerning for ischemia or infarct. Her echocardiogram showed an ejection fraction of 55-60% and there were no wall motion abnormalities. She was advised to maintain a low-sodium, healthy diet. She will follow-up with Dr. Leora Lopez as scheduled and as needed. Eliecer Stoner MSN, FNP-BC    Please note:  Portions of this chart were created with Dragon medical speech to text program.  Unrecognized errors may be present.

## 2018-07-12 NOTE — MR AVS SNAPSHOT
[address] [address] [address] [id number] [phone] Patient: Glen Lujan MRN: [id number] :1960 Visit Information Date & Time Provider Department Dept. Phone Encounter #  
 2018  9:00 AM Ana Rosa Palm NP [facility] [address] [id number] Your Appointments 10/18/2018  8:40 AM  
Follow Up with Kayleigh Vazuqez MD  
[facility] ([facility] Appt Note: 6 month follow up [place] [address] [address] [address] [phone] [address] Upcoming Health Maintenance Date Due Hepatitis C Screening 1960 DTaP/Tdap/Td series (1 - Tdap) 1983 PAP AKA CERVICAL CYTOLOGY 1983 FOBT Q 1 YEAR AGE 50-75 2012 Influenza Age 5 to Adult 2018 BREAST CANCER SCRN MAMMOGRAM 2019 Allergies as of 2018  Review Complete On: 2018 By: Ana Rosa Palm NP No Known Allergies Current Immunizations  Reviewed on 2018 Name Date Influenza Vaccine 2017 Not reviewed this visit You Were Diagnosed With   
  
 Codes Comments [facility] onset [facility]    -  Primary ICD-10-CM: I48.91 
ICD-9-CM: 427.31 Essential hypertension     ICD-10-CM: I10 
ICD-9-CM: 401.9 Vitals BP Pulse Height(growth percentile) Weight(growth percentile) SpO2 BMI  
 120/88 61 5' 2\" (1.575 m) 226 lb (102.5 kg) 98% 41.34 kg/m2 OB Status Smoking Status Having regular periods Never Smoker Vitals History BMI and BSA Data Body Mass Index Body Surface Area  
 41.34 kg/m 2 2.12 m 2 Preferred Pharmacy Pharmacy Name Phone [facility] - [place], [address] [phone] Your Updated Medication List  
  
   
 This list is accurate as of 7/12/18  9:46 AM.  Always use your most recent med list.  
  
  
  
  
 apixaban 5 mg tablet Commonly known as:  Manford Sonal Take 1 Tab by mouth two (2) times a day. meclizine 25 mg tablet Commonly known as:  ANTIVERT Take  by mouth three (3) times daily as needed. NORVASC 5 mg tablet Generic drug:  amLODIPine Take 5 mg by mouth daily. TYLENOL ARTHRITIS PAIN 650 mg Catherene Reach Generic drug:  acetaminophen Take 650 mg by mouth every eight (8) hours. Indications: Arthritic Pain VITAMIN D3 2,000 unit Tab Generic drug:  cholecalciferol (vitamin D3) Take  by mouth daily. We Performed the Following AMB POC EKG ROUTINE W/ 12 LEADS, INTER & REP [66894 CPT(R)] Patient Instructions Continue present medication regimen Follow-up with Dr. Larissa Cole as scheduled and as needed Please provide this summary of care documentation to your next provider. Your primary care clinician is listed as Teddy. If you have any questions after today's visit, please call [phone].

## 2018-07-12 NOTE — PATIENT INSTRUCTIONS
Continue present medication regimen  Follow-up with Dr. Dalia Morton as scheduled and as needed  Low sodium, heart healthy diet

## 2018-08-31 ENCOUNTER — HOSPITAL ENCOUNTER (OUTPATIENT)
Dept: MAMMOGRAPHY | Age: 58
Discharge: HOME | End: 2018-08-31
Attending: INTERNAL MEDICINE
Payer: COMMERCIAL

## 2018-08-31 DIAGNOSIS — Z12.31 VISIT FOR SCREENING MAMMOGRAM: ICD-10-CM

## 2018-08-31 PROCEDURE — 77067 SCR MAMMO BI INCL CAD: CPT

## 2018-10-18 ENCOUNTER — OFFICE VISIT (OUTPATIENT)
Dept: CARDIOLOGY CLINIC | Age: 58
End: 2018-10-18

## 2018-10-18 VITALS
DIASTOLIC BLOOD PRESSURE: 70 MMHG | HEIGHT: 62 IN | BODY MASS INDEX: 41.96 KG/M2 | WEIGHT: 228 LBS | HEART RATE: 57 BPM | OXYGEN SATURATION: 96 % | SYSTOLIC BLOOD PRESSURE: 116 MMHG

## 2018-10-18 DIAGNOSIS — I48.91 NEW ONSET ATRIAL FIBRILLATION (HCC): Primary | ICD-10-CM

## 2018-10-18 NOTE — PROGRESS NOTES
Yumiko Mobley presents today for Chief Complaint Patient presents with  Irregular Heart Beat  
  6 month follow up  Shortness of Breath  
  exertion  Palpitations  
  fluttering Yumiko Mobley preferred language for health care discussion is english/other. Is someone accompanying this pt? No  
 
Is the patient using any DME equipment during OV? No  
 
Depression Screening: No flowsheet data found. Learning Assessment: 
No flowsheet data found. Abuse Screening: No flowsheet data found. Fall Risk No flowsheet data found. Pt currently taking Anticoagulant therapy? Eliquis BID Coordination of Care: 1. Have you been to the ER, urgent care clinic since your last visit? Hospitalized since your last visit? No  
 
2. Have you seen or consulted any other health care providers outside of the [address] [place] [address] since your last visit? Include any pap smears or colon screening.  No

## 2018-10-18 NOTE — PROGRESS NOTES
History of Present Illness: A 66 y.o. female here for follow up. She has paroxysmal atrial fibrillation with symptoms lasting up to 10 minutes 3-4 times a month. They are tolerable with symptoms of dyspnea. No syncope, chest pain. She has had no bleeding issues with Eliquis. 
  
Impression/Plan:  
Paroxysmal symptomatic atrial fibrillation with reasonable rates, symptoms of dyspnea only. Chronic Eliquis use for atrial fibrillation with [id number] score of 2. She has no bleeding issues. Longstanding hypertension, controlled today. Echocardiogram and nuclear stress test April 2018 without ischemia, normal function, no valve disease, no evidence of LVH. I discussed risks, benefits and alternatives to rate control vs rhythm control with either antiarrhythmics or ablation. At this point, we decided to monitor closely and if she has more escalating episodes of atrial fibrillation, I would consider starting a class 1c agent and if they are quite severe, consider ablation. I will see her back in six months and likely move to an annual basis. Past Medical History:  
Diagnosis Date  Arthritis   
 bl knees  Atrial fibrillation ([facility]  Essential hypertension Current Outpatient Medications Medication Sig Dispense Refill  apixaban (ELIQUIS) 5 mg tablet Take 1 Tab by mouth two (2) times a day. 60 Tab 6  cholecalciferol, vitamin D3, (VITAMIN D3) 2,000 unit tab Take  by mouth daily.  meclizine (ANTIVERT) 25 mg tablet Take  by mouth three (3) times daily as needed.  amLODIPine (NORVASC) 5 mg tablet Take 5 mg by mouth daily.  acetaminophen (TYLENOL ARTHRITIS PAIN) 650 mg TbER Take 650 mg by mouth every eight (8) hours. Indications: Arthritic Pain Social History 
 reports that  has never smoked. she has never used smokeless tobacco. 
 reports that she does not drink alcohol.  
 
Family History 
family history includes Cancer in her father and mother; Diabetes in her mother; Heart Attack in her brother. Review of Systems Except as stated above include: 
Constitutional: Negative for fever, chills and malaise/fatigue. HEENT: No congestion or recent URI. Gastrointestinal: No nausea, vomiting, abdominal pain, bloody stools. Pulmonary:  Negative except as stated above. Cardiac:  Negative except as stated above. Musculoskeletal: Negative except as stated above. Neurological:  No localized symptoms. Skin:  Negative except as stated above. Psych:  Negative except as stated above. Endocrine:  Negative except as stated above. PHYSICAL EXAM 
BP Readings from Last 3 Encounters:  
10/18/18 116/70  
07/12/18 120/88  
04/12/18 110/74 Pulse Readings from Last 3 Encounters:  
10/18/18 (!) 57  
07/12/18 61  
04/12/18 62 Wt Readings from Last 3 Encounters:  
10/18/18 103.4 kg (228 lb)  
07/12/18 102.5 kg (226 lb) 04/12/18 97.5 kg (215 lb) General:   Well developed, well groomed. Head/Neck:   No jugular venous distention No carotid bruits. No evidence of xanthelasma. Lungs:   No respiratory distress. Clear bilaterally. Heart:    Regular rate and rhythm. Normal S1/S2. Palpation of heart with normal point of maximum impulse. No significant murmurs, rubs or gallops. Abdomen:   Soft and nontender. No palpable abdominal mass or bruits. Extremities:   Intact peripheral pulses. No significant edema. Neurological:   Alert and oriented to person, place, time. No focal neurological deficit visually. Skin:   No obvious rash Blood Pressure Metric: 
Gricelda Prado has been given the following recommendations today due to her elevated BP reading: monitor

## 2019-01-08 ENCOUNTER — HOSPITAL ENCOUNTER (OUTPATIENT)
Dept: LAB | Age: 59
Discharge: HOME | End: 2019-01-08
Payer: COMMERCIAL

## 2019-01-08 LAB
ALBUMIN SERPL-MCNC: 3.3 G/DL (ref 3.4–5)
ALBUMIN/GLOB SERPL: 1 {RATIO} (ref 0.8–1.7)
ALP SERPL-CCNC: 119 U/L (ref 45–117)
ALT SERPL-CCNC: 115 U/L (ref 13–56)
ANION GAP SERPL CALC-SCNC: 8 MMOL/L (ref 3–18)
AST SERPL-CCNC: 73 U/L (ref 15–37)
BASOPHILS # BLD: 0 K/UL (ref 0–0.06)
BASOPHILS NFR BLD: 0 % (ref 0–3)
BILIRUB SERPL-MCNC: 0.7 MG/DL (ref 0.2–1)
BUN SERPL-MCNC: 20 MG/DL (ref 7–18)
BUN/CREAT SERPL: 26 (ref 12–20)
CALCIUM SERPL-MCNC: 8.2 MG/DL (ref 8.5–10.1)
CHLORIDE SERPL-SCNC: 109 MMOL/L (ref 100–108)
CHOLEST SERPL-MCNC: 185 MG/DL
CO2 SERPL-SCNC: 25 MMOL/L (ref 21–32)
CREAT SERPL-MCNC: 0.77 MG/DL (ref 0.6–1.3)
DIFFERENTIAL METHOD BLD: ABNORMAL
EOSINOPHIL # BLD: 0.4 K/UL (ref 0–0.4)
EOSINOPHIL NFR BLD: 12 % (ref 0–5)
ERYTHROCYTE [DISTWIDTH] IN BLOOD BY AUTOMATED COUNT: 12.8 % (ref 11.6–14.5)
GLOBULIN SER CALC-MCNC: 3.3 G/DL (ref 2–4)
GLUCOSE SERPL-MCNC: 69 MG/DL (ref 74–99)
HCT VFR BLD AUTO: 36.7 % (ref 35–45)
HDLC SERPL-MCNC: 57 MG/DL (ref 40–60)
HDLC SERPL: 3.2 {RATIO} (ref 0–5)
HGB BLD-MCNC: 12.1 G/DL (ref 12–16)
LDLC SERPL CALC-MCNC: 122.6 MG/DL (ref 0–100)
LIPID PROFILE,FLP: ABNORMAL
LYMPHOCYTES # BLD: 1.4 K/UL (ref 0.8–3.5)
LYMPHOCYTES NFR BLD: 47 % (ref 20–51)
MCH RBC QN AUTO: 30 PG (ref 24–34)
MCHC RBC AUTO-ENTMCNC: 33 G/DL (ref 31–37)
MCV RBC AUTO: 90.8 FL (ref 74–97)
MONOCYTES # BLD: 0.2 K/UL (ref 0–1)
MONOCYTES NFR BLD: 7 % (ref 2–9)
NEUTS BAND NFR BLD MANUAL: 1 % (ref 0–5)
NEUTS SEG # BLD: 1 K/UL (ref 1.8–8)
NEUTS SEG NFR BLD: 33 % (ref 42–75)
PLATELET # BLD AUTO: 179 K/UL (ref 135–420)
PLATELET COMMENTS,PCOM: ABNORMAL
PMV BLD AUTO: 10.9 FL (ref 9.2–11.8)
POTASSIUM SERPL-SCNC: 4.1 MMOL/L (ref 3.5–5.5)
PROT SERPL-MCNC: 6.6 G/DL (ref 6.4–8.2)
RBC # BLD AUTO: 4.04 M/UL (ref 4.2–5.3)
RBC MORPH BLD: ABNORMAL
SODIUM SERPL-SCNC: 142 MMOL/L (ref 136–145)
T4 SERPL-MCNC: 12.9 UG/DL (ref 4.7–13.3)
TRIGL SERPL-MCNC: 27 MG/DL (ref ?–150)
TSH SERPL DL<=0.05 MIU/L-ACNC: 1.69 UIU/ML (ref 0.36–3.74)
VLDLC SERPL CALC-MCNC: 5.4 MG/DL
WBC # BLD AUTO: 3 K/UL (ref 4.6–13.2)

## 2019-01-08 PROCEDURE — 85025 COMPLETE CBC W/AUTO DIFF WBC: CPT

## 2019-01-08 PROCEDURE — 80061 LIPID PANEL: CPT

## 2019-01-08 PROCEDURE — 36415 COLL VENOUS BLD VENIPUNCTURE: CPT

## 2019-01-08 PROCEDURE — 84436 ASSAY OF TOTAL THYROXINE: CPT

## 2019-01-08 PROCEDURE — 80053 COMPREHEN METABOLIC PANEL: CPT

## 2019-04-18 ENCOUNTER — OFFICE VISIT (OUTPATIENT)
Dept: CARDIOLOGY CLINIC | Age: 59
End: 2019-04-18

## 2019-04-18 VITALS
OXYGEN SATURATION: 98 % | BODY MASS INDEX: 41.77 KG/M2 | WEIGHT: 227 LBS | HEART RATE: 59 BPM | SYSTOLIC BLOOD PRESSURE: 148 MMHG | HEIGHT: 62 IN | DIASTOLIC BLOOD PRESSURE: 94 MMHG

## 2019-04-18 DIAGNOSIS — I48.91 NEW ONSET ATRIAL FIBRILLATION (HCC): ICD-10-CM

## 2019-04-18 DIAGNOSIS — Z79.01 ANTICOAGULATED: ICD-10-CM

## 2019-04-18 DIAGNOSIS — R00.2 PALPITATION: Primary | ICD-10-CM

## 2019-04-18 DIAGNOSIS — I10 ESSENTIAL HYPERTENSION: ICD-10-CM

## 2019-04-18 RX ORDER — DICLOFENAC SODIUM 75 MG/1
75 TABLET, DELAYED RELEASE ORAL 2 TIMES DAILY
COMMUNITY

## 2019-04-18 RX ORDER — METOPROLOL TARTRATE 25 MG/1
25 TABLET, FILM COATED ORAL 2 TIMES DAILY
Qty: 60 TAB | Refills: 3 | Status: SHIPPED | OUTPATIENT
Start: 2019-04-18 | End: 2020-08-10

## 2019-04-18 NOTE — PROGRESS NOTES
History of Present Illness:  A 64 y.o. female here for follow up. Overall doing well over the past six months. She has had one episode of atrial fibrillation about 5 minutes. She denies any new chest pain, dyspnea, presyncope, syncope, PND, orthopnea or edema. She has been going to the gym and trying to exercise, no limitations. Impression/Plan:  
Paroxysmal symptomatic atrial fibrillation with reasonable rates, symptoms of dyspnea only. Chronic Eliquis use for atrial fibrillation with [id number] score of 2. No bleeding issues. Hypertension, slightly elevated today. I have asked her to monitor at home. Echocardiogram and nuclear stress test April 2018 without ischemia and normal function, no valve disease, no LVH. I discussed starting low dose beta-blocker to help with some of her palpitations at this point to take as needed. If they become more significant, I would start with a Class 1c agent prn and then schedule. She is trying to exercise and eat a good diet. I will see her back in six months. All questions answered. Past Medical History:  
Diagnosis Date  Arthritis   
 bl knees  Atrial fibrillation ([facility] [address]  Essential hypertension Current Outpatient Medications Medication Sig Dispense Refill  diclofenac EC (VOLTAREN) 75 mg EC tablet Take 75 mg by mouth two (2) times a day.  apixaban (ELIQUIS) 5 mg tablet Take 1 Tab by mouth two (2) times a day. 60 Tab 6  cholecalciferol, vitamin D3, (VITAMIN D3) 2,000 unit tab Take  by mouth daily.  meclizine (ANTIVERT) 25 mg tablet Take  by mouth three (3) times daily as needed.  amLODIPine (NORVASC) 5 mg tablet Take 5 mg by mouth daily.  acetaminophen (TYLENOL ARTHRITIS PAIN) 650 mg TbER Take 650 mg by mouth every eight (8) hours. Indications: Arthritic Pain Social History 
 reports that she has never smoked. She has never used smokeless tobacco. 
 reports that she does not drink alcohol. Family History family history includes Cancer in her father and mother; Diabetes in her mother; Heart Attack in her brother. Review of Systems Except as stated above include: 
Constitutional: Negative for fever, chills and malaise/fatigue. HEENT: No congestion or recent URI. Gastrointestinal: No nausea, vomiting, abdominal pain, bloody stools. Pulmonary:  Negative except as stated above. Cardiac:  Negative except as stated above. Musculoskeletal: Negative except as stated above. Neurological:  No localized symptoms. Skin:  Negative except as stated above. Psych:  Negative except as stated above. Endocrine:  Negative except as stated above. PHYSICAL EXAM 
BP Readings from Last 3 Encounters:  
04/18/19 (!) 148/94  
10/18/18 116/70  
07/12/18 120/88 Pulse Readings from Last 3 Encounters:  
04/18/19 (!) 59  
10/18/18 (!) 57  
07/12/18 61 Wt Readings from Last 3 Encounters:  
04/18/19 103 kg (227 lb) 10/18/18 103.4 kg (228 lb)  
07/12/18 102.5 kg (226 lb) General:   Well developed, well groomed. Head/Neck:   No jugular venous distention No carotid bruits. No evidence of xanthelasma. Lungs:   No respiratory distress. Clear bilaterally. Heart:    Regular rate and rhythm. Normal S1/S2. Palpation of heart with normal point of maximum impulse. No significant murmurs, rubs or gallops. Abdomen:   Soft and nontender. No palpable abdominal mass or bruits. Extremities:   Intact peripheral pulses. No significant edema. Neurological:   Alert and oriented to person, place, time. No focal neurological deficit visually. Skin:   No obvious rash Blood Pressure Metric: 
Monitor recommended and adjustments stated if needed.

## 2019-08-10 ENCOUNTER — HOSPITAL ENCOUNTER (OUTPATIENT)
Dept: GENERAL RADIOLOGY | Age: 59
Discharge: HOME | End: 2019-08-10
Payer: COMMERCIAL

## 2019-08-10 ENCOUNTER — HOSPITAL ENCOUNTER (OUTPATIENT)
Dept: LAB | Age: 59
Discharge: HOME | End: 2019-08-10
Payer: COMMERCIAL

## 2019-08-10 DIAGNOSIS — S93.402A LEFT ANKLE SPRAIN: ICD-10-CM

## 2019-08-10 LAB
ALBUMIN SERPL-MCNC: 3 G/DL (ref 3.4–5)
ALBUMIN/GLOB SERPL: 0.9 {RATIO} (ref 0.8–1.7)
ALP SERPL-CCNC: 97 U/L (ref 45–117)
ALT SERPL-CCNC: 46 U/L (ref 13–56)
ANION GAP SERPL CALC-SCNC: 5 MMOL/L (ref 3–18)
AST SERPL-CCNC: 23 U/L (ref 10–38)
BASOPHILS # BLD: 0 K/UL (ref 0–0.1)
BASOPHILS NFR BLD: 0 % (ref 0–2)
BILIRUB SERPL-MCNC: 0.7 MG/DL (ref 0.2–1)
BUN SERPL-MCNC: 22 MG/DL (ref 7–18)
BUN/CREAT SERPL: 27 (ref 12–20)
CALCIUM SERPL-MCNC: 8.1 MG/DL (ref 8.5–10.1)
CHLORIDE SERPL-SCNC: 110 MMOL/L (ref 100–111)
CHOLEST SERPL-MCNC: 174 MG/DL
CO2 SERPL-SCNC: 27 MMOL/L (ref 21–32)
CREAT SERPL-MCNC: 0.81 MG/DL (ref 0.6–1.3)
DIFFERENTIAL METHOD BLD: ABNORMAL
EOSINOPHIL # BLD: 0.1 K/UL (ref 0–0.4)
EOSINOPHIL NFR BLD: 2 % (ref 0–5)
ERYTHROCYTE [DISTWIDTH] IN BLOOD BY AUTOMATED COUNT: 15.3 % (ref 11.6–14.5)
GLOBULIN SER CALC-MCNC: 3.2 G/DL (ref 2–4)
GLUCOSE SERPL-MCNC: 94 MG/DL (ref 74–99)
HCT VFR BLD AUTO: 32.1 % (ref 35–45)
HDLC SERPL-MCNC: 63 MG/DL (ref 40–60)
HDLC SERPL: 2.8 {RATIO} (ref 0–5)
HGB BLD-MCNC: 10.4 G/DL (ref 12–16)
LDLC SERPL CALC-MCNC: 104.6 MG/DL (ref 0–100)
LIPID PROFILE,FLP: ABNORMAL
LYMPHOCYTES # BLD: 3.3 K/UL (ref 0.9–3.6)
LYMPHOCYTES NFR BLD: 62 % (ref 21–52)
MCH RBC QN AUTO: 27.3 PG (ref 24–34)
MCHC RBC AUTO-ENTMCNC: 32.4 G/DL (ref 31–37)
MCV RBC AUTO: 84.3 FL (ref 74–97)
MONOCYTES # BLD: 0.3 K/UL (ref 0.05–1.2)
MONOCYTES NFR BLD: 5 % (ref 3–10)
NEUTS SEG # BLD: 1.7 K/UL (ref 1.8–8)
NEUTS SEG NFR BLD: 31 % (ref 40–73)
PLATELET # BLD AUTO: 218 K/UL (ref 135–420)
PMV BLD AUTO: 10.1 FL (ref 9.2–11.8)
POTASSIUM SERPL-SCNC: 4 MMOL/L (ref 3.5–5.5)
PROT SERPL-MCNC: 6.2 G/DL (ref 6.4–8.2)
RBC # BLD AUTO: 3.81 M/UL (ref 4.2–5.3)
SODIUM SERPL-SCNC: 142 MMOL/L (ref 136–145)
TRIGL SERPL-MCNC: 32 MG/DL (ref ?–150)
TSH SERPL DL<=0.05 MIU/L-ACNC: 2.15 UIU/ML (ref 0.36–3.74)
VLDLC SERPL CALC-MCNC: 6.4 MG/DL
WBC # BLD AUTO: 5.4 K/UL (ref 4.6–13.2)

## 2019-08-10 PROCEDURE — 36415 COLL VENOUS BLD VENIPUNCTURE: CPT

## 2019-08-10 PROCEDURE — 84436 ASSAY OF TOTAL THYROXINE: CPT

## 2019-08-10 PROCEDURE — 73610 X-RAY EXAM OF ANKLE: CPT

## 2019-08-10 PROCEDURE — 80053 COMPREHEN METABOLIC PANEL: CPT

## 2019-08-10 PROCEDURE — 85025 COMPLETE CBC W/AUTO DIFF WBC: CPT

## 2019-08-10 PROCEDURE — 84443 ASSAY THYROID STIM HORMONE: CPT

## 2019-08-10 PROCEDURE — 80061 LIPID PANEL: CPT

## 2019-08-12 LAB — T4 SERPL-MCNC: 10 UG/DL (ref 4.8–13.9)

## 2019-09-03 ENCOUNTER — HOSPITAL ENCOUNTER (OUTPATIENT)
Dept: MAMMOGRAPHY | Age: 59
Discharge: HOME | End: 2019-09-03
Attending: INTERNAL MEDICINE
Payer: COMMERCIAL

## 2019-09-03 DIAGNOSIS — Z12.39 BREAST SCREENING, UNSPECIFIED: ICD-10-CM

## 2019-09-03 PROCEDURE — 77067 SCR MAMMO BI INCL CAD: CPT

## 2019-10-17 ENCOUNTER — OFFICE VISIT (OUTPATIENT)
Dept: CARDIOLOGY CLINIC | Age: 59
End: 2019-10-17

## 2019-10-17 VITALS
OXYGEN SATURATION: 97 % | BODY MASS INDEX: 40.3 KG/M2 | SYSTOLIC BLOOD PRESSURE: 140 MMHG | WEIGHT: 219 LBS | HEIGHT: 62 IN | HEART RATE: 60 BPM | DIASTOLIC BLOOD PRESSURE: 70 MMHG

## 2019-10-17 DIAGNOSIS — I48.91 NEW ONSET ATRIAL FIBRILLATION (HCC): Primary | ICD-10-CM

## 2019-10-17 DIAGNOSIS — I10 ESSENTIAL HYPERTENSION: ICD-10-CM

## 2019-10-17 DIAGNOSIS — R00.2 PALPITATION: ICD-10-CM

## 2019-10-17 NOTE — PROGRESS NOTES
Jose Sierra presents today for   Chief Complaint   Patient presents with    Irregular Heart Beat     6 month follow up     Palpitations     sometimes        Jose Sierra preferred language for health care discussion is english/other. Is someone accompanying this pt? no    Is the patient using any DME equipment during [address]? no    Depression Screening:  3 most recent PHQ Screens 4/18/2019   Little interest or pleasure in doing things Not at all   Feeling down, depressed, irritable, or hopeless Not at all   Total Score PHQ 2 0       Learning Assessment:  Learning Assessment 10/17/2019   PRIMARY LEARNER Patient   PRIMARY LANGUAGE ENGLISH   LEARNER PREFERENCE PRIMARY DEMONSTRATION   ANSWERED BY Patient   RELATIONSHIP SELF       Abuse Screening:  Abuse Screening Questionnaire 10/17/2019   Do you ever feel afraid of your partner? N   Are you in a relationship with someone who physically or mentally threatens you? N   Is it safe for you to go home? Y       Fall Risk  Fall Risk Assessment, last 12 mths 10/17/2019   Able to walk? Yes   Fall in past 12 months? No       Pt currently taking Anticoagulant therapy? Eliquis     Coordination of Care:  1. Have you been to the ER, urgent care clinic since your last visit? Hospitalized since your last visit? no    2. Have you seen or consulted any other health care providers outside of the [address] [place] [address] since your last visit? Include any pap smears or colon screening.  no

## 2019-10-17 NOTE — PROGRESS NOTES
HPI: A 60-year old female here for follow up. Overall she has been doing well for the past six months. She has rare episodes of palpitations not lasting more than a few minutes about once a month. They are tolerable and not life changing. They are very manageable. She denies any new chest pain, dyspnea, PND, orthopnea, edema. She tries to exercise and has no limitations. Impression/Plan:  1. Paroxysmal symptomatic atrial fibrillation with reasonable rate, symptoms of dyspnea controlled. 2. Chronic Eliquis use for atrial fibrillation with [id number] Score of 2, no bleeding issues. 3. Hypertension monitoring followed by Dr. Garret Gonzales. 4. Echocardiogram and nuclear stress test April 2018 without ischemia with normal function, no valvular disease or LVH. She is doing well on Eliquis for stroke prevention which I will continue long term and indefinitely. Her blood pressure is a bit on the high side. She will follow up with Dr. Garret Gonzales. Unless her symptoms become more significant, I would hold off on beta blocker and Class IC agent at this time. I will see her back in one year. Past Medical History:   Diagnosis Date    Arthritis     bl knees     Atrial fibrillation (HCC)     Essential hypertension        Current Outpatient Medications   Medication Sig Dispense Refill    diclofenac EC (VOLTAREN) 75 mg EC tablet Take 75 mg by mouth two (2) times a day.  apixaban (ELIQUIS) 5 mg tablet Take 1 Tab by mouth two (2) times a day. 60 Tab 6    metoprolol tartrate (LOPRESSOR) 25 mg tablet Take 1 Tab by mouth two (2) times a day. As needed 60 Tab 3    cholecalciferol, vitamin D3, (VITAMIN D3) 2,000 unit tab Take  by mouth daily.  meclizine (ANTIVERT) 25 mg tablet Take  by mouth three (3) times daily as needed.  amLODIPine (NORVASC) 5 mg tablet Take 5 mg by mouth daily. Social History   reports that she has never smoked.  She has never used smokeless tobacco.   reports that she does not drink alcohol. Family History  family history includes Cancer in her father and mother; Diabetes in her mother; Heart Attack in her brother. Review of Systems  Except as stated above include:  Constitutional: Negative for fever, chills and malaise/fatigue. HEENT: No congestion or recent URI. Gastrointestinal: No nausea, vomiting, abdominal pain, bloody stools. Pulmonary:  Negative except as stated above. Cardiac:  Negative except as stated above. Musculoskeletal: Negative except as stated above. Neurological:  No localized symptoms. Skin:  Negative except as stated above. Psych:  Negative except as stated above. Endocrine:  Negative except as stated above. PHYSICAL EXAM  BP Readings from Last 3 Encounters:   10/17/19 140/70   04/18/19 (!) 148/94   10/18/18 116/70     Pulse Readings from Last 3 Encounters:   10/17/19 60   04/18/19 (!) 59   10/18/18 (!) 57     Wt Readings from Last 3 Encounters:   10/17/19 99.3 kg (219 lb)   04/18/19 103 kg (227 lb)   10/18/18 103.4 kg (228 lb)     General:   Well developed, well groomed. Head/Neck:   No jugular venous distention     No carotid bruits. No evidence of xanthelasma. Lungs:   No respiratory distress. Clear bilaterally. Heart:    Regular rate and rhythm. Normal S1/S2. Palpation of heart with normal point of maximum impulse. No significant murmurs, rubs or gallops. Abdomen:   Soft and nontender. No palpable abdominal mass or bruits. Extremities:   Intact peripheral pulses. No significant edema. Neurological:   Alert and oriented to person, place, time. No focal neurological deficit visually. Skin:   No obvious rash    Blood Pressure Metric:  Monitor recommended and adjustments stated if needed.

## 2019-12-14 ENCOUNTER — HOSPITAL ENCOUNTER (EMERGENCY)
Age: 59
Discharge: HOME | End: 2019-12-14
Attending: EMERGENCY MEDICINE
Payer: COMMERCIAL

## 2019-12-14 ENCOUNTER — APPOINTMENT (OUTPATIENT)
Dept: GENERAL RADIOLOGY | Age: 59
End: 2019-12-14
Attending: EMERGENCY MEDICINE
Payer: COMMERCIAL

## 2019-12-14 VITALS
WEIGHT: 219 LBS | SYSTOLIC BLOOD PRESSURE: 106 MMHG | OXYGEN SATURATION: 100 % | BODY MASS INDEX: 40.3 KG/M2 | DIASTOLIC BLOOD PRESSURE: 82 MMHG | RESPIRATION RATE: 25 BRPM | TEMPERATURE: 98.8 F | HEIGHT: 62 IN | HEART RATE: 94 BPM

## 2019-12-14 DIAGNOSIS — R00.2 PALPITATIONS: ICD-10-CM

## 2019-12-14 DIAGNOSIS — I48.91 ATRIAL FIBRILLATION WITH RVR (HCC): Primary | ICD-10-CM

## 2019-12-14 LAB
ANION GAP SERPL CALC-SCNC: 9 MMOL/L (ref 3–18)
APPEARANCE UR: CLEAR
BASOPHILS # BLD: 0 K/UL (ref 0–0.1)
BASOPHILS NFR BLD: 0 % (ref 0–2)
BILIRUB UR QL: NEGATIVE
BUN SERPL-MCNC: 14 MG/DL (ref 7–18)
BUN/CREAT SERPL: 16 (ref 12–20)
CALCIUM SERPL-MCNC: 9.5 MG/DL (ref 8.5–10.1)
CHLORIDE SERPL-SCNC: 115 MMOL/L (ref 100–111)
CK MB CFR SERPL CALC: 0.8 % (ref 0–4)
CK MB SERPL-MCNC: 1.2 NG/ML (ref 5–25)
CK SERPL-CCNC: 159 U/L (ref 26–192)
CO2 SERPL-SCNC: 21 MMOL/L (ref 21–32)
COLOR UR: YELLOW
CREAT SERPL-MCNC: 0.88 MG/DL (ref 0.6–1.3)
DIFFERENTIAL METHOD BLD: ABNORMAL
EOSINOPHIL # BLD: 0.3 K/UL (ref 0–0.4)
EOSINOPHIL NFR BLD: 6 % (ref 0–5)
ERYTHROCYTE [DISTWIDTH] IN BLOOD BY AUTOMATED COUNT: 14.7 % (ref 11.6–14.5)
GLUCOSE SERPL-MCNC: 74 MG/DL (ref 74–99)
GLUCOSE UR STRIP.AUTO-MCNC: NEGATIVE MG/DL
HCT VFR BLD AUTO: 34 % (ref 35–45)
HGB BLD-MCNC: 11 G/DL (ref 12–16)
HGB UR QL STRIP: NEGATIVE
KETONES UR QL STRIP.AUTO: NEGATIVE MG/DL
LACTATE BLD-SCNC: 1.2 MMOL/L (ref 0.4–2)
LEUKOCYTE ESTERASE UR QL STRIP.AUTO: NEGATIVE
LYMPHOCYTES # BLD: 2.3 K/UL (ref 0.9–3.6)
LYMPHOCYTES NFR BLD: 51 % (ref 21–52)
MAGNESIUM SERPL-MCNC: 2.2 MG/DL (ref 1.6–2.6)
MCH RBC QN AUTO: 25.7 PG (ref 24–34)
MCHC RBC AUTO-ENTMCNC: 32.4 G/DL (ref 31–37)
MCV RBC AUTO: 79.4 FL (ref 74–97)
MONOCYTES # BLD: 0.3 K/UL (ref 0.05–1.2)
MONOCYTES NFR BLD: 6 % (ref 3–10)
NEUTS SEG # BLD: 1.7 K/UL (ref 1.8–8)
NEUTS SEG NFR BLD: 37 % (ref 40–73)
NITRITE UR QL STRIP.AUTO: NEGATIVE
PH UR STRIP: >8.5 [PH] (ref 5–8)
PLATELET # BLD AUTO: 275 K/UL (ref 135–420)
PMV BLD AUTO: 10.1 FL (ref 9.2–11.8)
POTASSIUM SERPL-SCNC: 3.4 MMOL/L (ref 3.5–5.5)
PROT UR STRIP-MCNC: NEGATIVE MG/DL
RBC # BLD AUTO: 4.28 M/UL (ref 4.2–5.3)
SODIUM SERPL-SCNC: 145 MMOL/L (ref 136–145)
SP GR UR REFRACTOMETRY: 1.01 (ref 1–1.03)
TROPONIN I SERPL-MCNC: <0.02 NG/ML (ref 0–0.04)
TSH SERPL DL<=0.05 MIU/L-ACNC: 3.32 UIU/ML (ref 0.36–3.74)
UROBILINOGEN UR QL STRIP.AUTO: 0.2 EU/DL (ref 0.2–1)
WBC # BLD AUTO: 4.6 K/UL (ref 4.6–13.2)

## 2019-12-14 PROCEDURE — 84443 ASSAY THYROID STIM HORMONE: CPT

## 2019-12-14 PROCEDURE — 83735 ASSAY OF MAGNESIUM: CPT

## 2019-12-14 PROCEDURE — 80048 BASIC METABOLIC PNL TOTAL CA: CPT

## 2019-12-14 PROCEDURE — 74011000250 HC RX REV CODE- 250: Performed by: EMERGENCY MEDICINE

## 2019-12-14 PROCEDURE — 82550 ASSAY OF CK (CPK): CPT

## 2019-12-14 PROCEDURE — 96374 THER/PROPH/DIAG INJ IV PUSH: CPT

## 2019-12-14 PROCEDURE — 93005 ELECTROCARDIOGRAM TRACING: CPT

## 2019-12-14 PROCEDURE — 83605 ASSAY OF LACTIC ACID: CPT

## 2019-12-14 PROCEDURE — 99285 EMERGENCY DEPT VISIT HI MDM: CPT

## 2019-12-14 PROCEDURE — 85025 COMPLETE CBC W/AUTO DIFF WBC: CPT

## 2019-12-14 PROCEDURE — 74011250636 HC RX REV CODE- 250/636: Performed by: EMERGENCY MEDICINE

## 2019-12-14 PROCEDURE — 71045 X-RAY EXAM CHEST 1 VIEW: CPT

## 2019-12-14 PROCEDURE — 81003 URINALYSIS AUTO W/O SCOPE: CPT

## 2019-12-14 RX ORDER — METOPROLOL TARTRATE 5 MG/5ML
5 INJECTION INTRAVENOUS
Status: DISCONTINUED | OUTPATIENT
Start: 2019-12-14 | End: 2019-12-14

## 2019-12-14 RX ORDER — METOPROLOL TARTRATE 5 MG/5ML
2.5 INJECTION INTRAVENOUS
Status: COMPLETED | OUTPATIENT
Start: 2019-12-14 | End: 2019-12-14

## 2019-12-14 RX ORDER — METOPROLOL TARTRATE 5 MG/5ML
2.5 INJECTION INTRAVENOUS
Status: DISCONTINUED | OUTPATIENT
Start: 2019-12-14 | End: 2019-12-14

## 2019-12-14 RX ADMIN — SODIUM CHLORIDE 500 ML: 900 INJECTION, SOLUTION INTRAVENOUS at 18:58

## 2019-12-14 RX ADMIN — METOPROLOL TARTRATE 2.5 MG: 5 INJECTION INTRAVENOUS at 18:58

## 2019-12-14 NOTE — ED PROVIDER NOTES
EMERGENCY DEPARTMENT HISTORY AND PHYSICAL EXAM    3:44 PM      Date: 12/14/2019  Patient Name: Jose Sierra    History of Presenting Illness     Chief Complaint   Patient presents with    Rapid Heart Rate         History Provided By: Patient    Additional History (Context): Jose Sierra is a 64 y.o. female with Past medical history of atrial fibrillation, hypertension, patient on Eliquis who presents with chief complaint of palpitations started earlier today. Patient states is feel like her heart is fluttering and going fast.  She states that she is to take metoprolol when she gets the symptoms but she did not take it today. She denies any chest pain, dizziness, shortness of breath, abdominal pain, leg pain, leg swelling, nausea, and no other complaint. She states her cardiologist is Dr. Lizy Mata    PCP: Ethel Wise MD        Past History     Past Medical History:  Past Medical History:   Diagnosis Date    Arthritis     bl knees     Atrial fibrillation ([facility] [address]     Essential hypertension        Past Surgical History:  No past surgical history on file. Family History:  Family History   Problem Relation Age of Onset    Cancer Mother     Diabetes Mother     Cancer Father     Heart Attack Brother        Social History:  Social History     Tobacco Use    Smoking status: Never Smoker    Smokeless tobacco: Never Used   Substance Use Topics    Alcohol use: No    Drug use: No       Allergies:  No Known Allergies      Review of Systems       Review of Systems   Constitutional: Negative for chills and fever. HENT: Negative for congestion, rhinorrhea, sore throat and trouble swallowing. Eyes: Negative for visual disturbance. Respiratory: Negative for cough, shortness of breath and wheezing. Cardiovascular: Positive for palpitations. Negative for chest pain and leg swelling. Gastrointestinal: Negative for abdominal pain, nausea and vomiting. Endocrine: Negative for polyuria. Genitourinary: Positive for frequency. Negative for dysuria. Musculoskeletal: Negative for arthralgias and neck stiffness. Skin: Negative for pallor and rash. Neurological: Negative for dizziness, weakness, numbness and headaches. Hematological: Does not bruise/bleed easily. Psychiatric/Behavioral: Negative for confusion and dysphoric mood. All other systems reviewed and are negative. Physical Exam     Visit Vitals  /82 (BP 1 Location: Right arm, BP Patient Position: At rest)   Pulse 94   Temp 98.8 °F (37.1 °C)   Resp 25   Ht 5' 2\" (1.575 m)   Wt 99.3 kg (219 lb)   SpO2 100%   BMI 40.06 kg/m²         Physical Exam  Vitals signs and nursing note reviewed. Constitutional:       General: She is not in acute distress. Appearance: She is well-developed. She is not ill-appearing, toxic-appearing or diaphoretic. HENT:      Head: Normocephalic and atraumatic. Mouth/Throat:      Mouth: Mucous membranes are moist.   Eyes:      General: No scleral icterus. Conjunctiva/sclera: Conjunctivae normal.      Pupils: Pupils are equal, round, and reactive to light. Neck:      Musculoskeletal: Normal range of motion and neck supple. Cardiovascular:      Rate and Rhythm: Tachycardia present. Rhythm irregular. Heart sounds: No murmur. No gallop. Comments: Capillary refill < 3 seconds  Pulmonary:      Effort: Pulmonary effort is normal. No respiratory distress. Breath sounds: Normal breath sounds. No wheezing. Abdominal:      General: Bowel sounds are normal. There is no distension. Palpations: Abdomen is soft. Tenderness: There is no tenderness. Musculoskeletal: Normal range of motion. General: No swelling or tenderness. Right lower leg: No edema. Left lower leg: No edema. Lymphadenopathy:      Cervical: No cervical adenopathy. Skin:     General: Skin is warm and dry. Coloration: Skin is not jaundiced or pale.    Neurological: Mental Status: She is alert and oriented to person, place, and time. Cranial Nerves: No cranial nerve deficit. Sensory: No sensory deficit. Motor: No weakness. Coordination: Coordination normal.   Psychiatric:         Mood and Affect: Mood normal.         Thought Content: Thought content normal.           Diagnostic Study Results     Labs -  Recent Results (from the past 12 hour(s))   EKG, 12 LEAD, INITIAL    Collection Time: 12/14/19  4:28 PM   Result Value Ref Range    Ventricular Rate 121 BPM    Atrial Rate 147 BPM    QRS Duration 76 ms    Q-T Interval 320 ms    QTC Calculation (Bezet) 454 ms    Calculated R Axis 22 degrees    Calculated T Axis 58 degrees    Diagnosis       Atrial fibrillation with rapid ventricular response  Nonspecific ST and T wave abnormality  Abnormal ECG  When compared with ECG of 25-APR-2018 09:56,  Atrial fibrillation has replaced Sinus rhythm  Vent. rate has increased BY  64 BPM  ST now depressed in Inferior leads  Nonspecific T wave abnormality now evident in Inferior leads     CBC WITH AUTOMATED DIFF    Collection Time: 12/14/19  5:37 PM   Result Value Ref Range    WBC 4.6 4.6 - 13.2 K/uL    RBC 4.28 4.20 - 5.30 M/uL    HGB 11.0 (L) 12.0 - 16.0 g/dL    HCT 34.0 (L) 35.0 - 45.0 %    MCV 79.4 74.0 - 97.0 FL    MCH 25.7 24.0 - 34.0 PG    MCHC 32.4 31.0 - 37.0 g/dL    RDW 14.7 (H) 11.6 - 14.5 %    PLATELET 388 923 - 294 K/uL    MPV 10.1 9.2 - 11.8 FL    NEUTROPHILS 37 (L) 40 - 73 %    LYMPHOCYTES 51 21 - 52 %    MONOCYTES 6 3 - 10 %    EOSINOPHILS 6 (H) 0 - 5 %    BASOPHILS 0 0 - 2 %    ABS. NEUTROPHILS 1.7 (L) 1.8 - 8.0 K/UL    ABS. LYMPHOCYTES 2.3 0.9 - 3.6 K/UL    ABS. MONOCYTES 0.3 0.05 - 1.2 K/UL    ABS. EOSINOPHILS 0.3 0.0 - 0.4 K/UL    ABS.  BASOPHILS 0.0 0.0 - 0.1 K/UL    DF AUTOMATED     CARDIAC PANEL,(CK, CKMB & TROPONIN)    Collection Time: 12/14/19  5:37 PM   Result Value Ref Range     26 - 192 U/L    CK - MB 1.2 <3.6 ng/ml    CK-MB Index 0.8 0.0 - 4.0 %    Troponin-I, QT <0.02 0.0 - 0.045 NG/ML   MAGNESIUM    Collection Time: 12/14/19  5:37 PM   Result Value Ref Range    Magnesium 2.2 1.6 - 2.6 mg/dL   TSH 3RD GENERATION    Collection Time: 12/14/19  5:37 PM   Result Value Ref Range    TSH 3.32 0.36 - 8.23 uIU/mL   METABOLIC PANEL, BASIC    Collection Time: 12/14/19  5:37 PM   Result Value Ref Range    Sodium 145 136 - 145 mmol/L    Potassium 3.4 (L) 3.5 - 5.5 mmol/L    Chloride 115 (H) 100 - 111 mmol/L    CO2 21 21 - 32 mmol/L    Anion gap 9 3.0 - 18 mmol/L    Glucose 74 74 - 99 mg/dL    BUN 14 7.0 - 18 MG/DL    Creatinine 0.88 0.6 - 1.3 MG/DL    BUN/Creatinine ratio 16 12 - 20      GFR est AA >60 >60 ml/min/1.73m2    GFR est non-AA >60 >60 ml/min/1.73m2    Calcium 9.5 8.5 - 10.1 MG/DL   POC LACTIC ACID    Collection Time: 12/14/19  5:43 PM   Result Value Ref Range    Lactic Acid (POC) 1.20 0.40 - 2.00 mmol/L   URINALYSIS W/ RFLX MICROSCOPIC    Collection Time: 12/14/19  7:11 PM   Result Value Ref Range    Color YELLOW      Appearance CLEAR      Specific gravity 1.008 1.005 - 1.030      pH (UA) >8.5 (H) 5.0 - 8.0    Protein NEGATIVE  NEG mg/dL    Glucose NEGATIVE  NEG mg/dL    Ketone NEGATIVE  NEG mg/dL    Bilirubin NEGATIVE  NEG      Blood NEGATIVE  NEG      Urobilinogen 0.2 0.2 - 1.0 EU/dL    Nitrites NEGATIVE  NEG      Leukocyte Esterase NEGATIVE  NEG         Radiologic Studies -   XR CHEST PORT   Final Result   Impression:   1. No acute cardiopulmonary process. Per my preliminary: No acute process  Marah Myles DO      Medical Decision Making   I am the first provider for this patient. I reviewed the vital signs, available nursing notes, past medical history, past surgical history, family history and social history. Vital Signs-Reviewed the patient's vital signs. Cardiac Monitor:  Rate: 112  Rhythm:  Atrial Fibrillation     EKG: Interpreted by the EP.     Rate: 121   Rhythm: Atrial Fibrillation    Interpretation: Normal QRS duration, normal QTC, no ST elevation, has nonspecific T wave changes       Records Reviewed: Nursing Notes and Old Medical Records (Time of Review: 3:44 PM)    Provider Notes (Medical Decision Making): DDX: Arrhythmia, history of atrial fibrillation, patient is on Eliquis, could be other metabolic causes, infectious, anxiety    Labs, EKG, chest x-ray, give dose of metoprolol    MDM    Medications   sodium chloride 0.9 % bolus infusion 500 mL (500 mL IntraVENous New Bag 12/14/19 1858)   metoprolol (LOPRESSOR) injection 2.5 mg (2.5 mg IntraVENous Given 12/14/19 1858)           ED Course: Progress Notes, Reevaluation, and Consults:  WBC within normal limits  Lactic acid within normal limits    After metoprolol heart rate now in the 90s    I have reassessed the patient. I have discussed the workup, results and plan with the patient and patient is in agreement. Patient is feeling better. Patient was discharge in stable condition. Patient was given outpatient follow up. Patient is to return to emergency department if any new or worsening condition. December 14, 2019    Diagnosis     Clinical Impression:   1. Atrial fibrillation with RVR (HCC)    2. Palpitations        Disposition: Discharge    Follow-up Information     Follow up With Specialties Details Why Contact Info    Sam Shepherd MD Cardiology Schedule an appointment as soon as possible for a visit in 2 days  [address]      Hossein Gee MD Internal Medicine Schedule an appointment as soon as possible for a visit in 1 week  [address] [facility] EMERGENCY DEPT Emergency Medicine  As needed, If symptoms worsen [address]  [phone]           Patient's Medications   Start Taking    No medications on file   Continue Taking    AMLODIPINE (NORVASC) 5 MG TABLET    Take 5 mg by mouth daily.     APIXABAN (ELIQUIS) 5 MG TABLET    Take 1 Tab by mouth two (2) times a day. CHOLECALCIFEROL, VITAMIN D3, (VITAMIN D3) 2,000 UNIT TAB    Take  by mouth daily. DICLOFENAC EC (VOLTAREN) 75 MG EC TABLET    Take 75 mg by mouth two (2) times a day. MECLIZINE (ANTIVERT) 25 MG TABLET    Take  by mouth three (3) times daily as needed. METOPROLOL TARTRATE (LOPRESSOR) 25 MG TABLET    Take 1 Tab by mouth two (2) times a day. As needed   These Medications have changed    No medications on file   Stop Taking    No medications on file         Romana Leaf, DO Dragon medical dictation software was used for portions of this report. Unintended transcription errors may occur. My signature above authenticates this document and my orders, the final    diagnosis (es), discharge prescription (s), and instructions in the Epic    record.

## 2019-12-15 LAB
ATRIAL RATE: 147 BPM
CALCULATED R AXIS, ECG10: 22 DEGREES
CALCULATED T AXIS, ECG11: 58 DEGREES
DIAGNOSIS, 93000: NORMAL
Q-T INTERVAL, ECG07: 320 MS
QRS DURATION, ECG06: 76 MS
QTC CALCULATION (BEZET), ECG08: 454 MS
VENTRICULAR RATE, ECG03: 121 BPM

## 2019-12-15 NOTE — DISCHARGE INSTRUCTIONS
Patient Education        Atrial Fibrillation: Care Instructions  Your Care Instructions    Atrial fibrillation is an irregular and often fast heartbeat. Treating this condition is important for several reasons. It can cause blood clots, which can travel from your heart to your brain and cause a stroke. If you have a fast heartbeat, you may feel lightheaded, dizzy, and weak. An irregular heartbeat can also increase your risk for heart failure. Atrial fibrillation is often the result of another heart condition, such as high blood pressure or coronary artery disease. Making changes to improve your heart condition will help you stay healthy and active. Follow-up care is a key part of your treatment and safety. Be sure to make and go to all appointments, and call your doctor if you are having problems. It's also a good idea to know your test results and keep a list of the medicines you take. How can you care for yourself at home? Medicines    · Take your medicines exactly as prescribed. Call your doctor if you think you are having a problem with your medicine. You will get more details on the specific medicines your doctor prescribes.     · If your doctor has given you a blood thinner to prevent a stroke, be sure you get instructions about how to take your medicine safely. Blood thinners can cause serious bleeding problems.     · Do not take any vitamins, over-the-counter drugs, or herbal products without talking to your doctor first.    Lifestyle changes    · Do not smoke. Smoking can increase your chance of a stroke and heart attack. If you need help quitting, talk to your doctor about stop-smoking programs and medicines. These can increase your chances of quitting for good.     · Eat a heart-healthy diet.     · Stay at a healthy weight. Lose weight if you need to.     · Limit alcohol to 2 drinks a day for men and 1 drink a day for women. Too much alcohol can cause health problems.     · Avoid colds and flu.  Get a pneumococcal vaccine shot. If you have had one before, ask your doctor whether you need another dose. Get a flu shot every year. If you must be around people with colds or flu, wash your hands often. Activity    · If your doctor recommends it, get more exercise. Walking is a good choice. Bit by bit, increase the amount you walk every day. Try for at least 30 minutes on most days of the week. You also may want to swim, bike, or do other activities. Your doctor may suggest that you join a cardiac rehabilitation program so that you can have help increasing your physical activity safely.     · Start light exercise if your doctor says it is okay. Even a small amount will help you get stronger, have more energy, and manage stress. Walking is an easy way to get exercise. Start out by walking a little more than you did in the hospital. Gradually increase the amount you walk.     · When you exercise, watch for signs that your heart is working too hard. You are pushing too hard if you cannot talk while you are exercising. If you become short of breath or dizzy or have chest pain, sit down and rest immediately.     · Check your pulse regularly. Place two fingers on the artery at the palm side of your wrist, in line with your thumb. If your heartbeat seems uneven or fast, talk to your doctor. When should you call for help? Call 911 anytime you think you may need emergency care. For example, call if:    · You have symptoms of a heart attack. These may include:  ? Chest pain or pressure, or a strange feeling in the chest.  ? Sweating. ? Shortness of breath. ? Nausea or vomiting. ? Pain, pressure, or a strange feeling in the back, neck, jaw, or upper belly or in one or both shoulders or arms. ? Lightheadedness or sudden weakness. ? A fast or irregular heartbeat. After you call 911, the  may tell you to chew 1 adult-strength or 2 to 4 low-dose aspirin. Wait for an ambulance.  Do not try to drive yourself.     · You have symptoms of a stroke. These may include:  ? Sudden numbness, tingling, weakness, or loss of movement in your face, arm, or leg, especially on only one side of your body. ? Sudden vision changes. ? Sudden trouble speaking. ? Sudden confusion or trouble understanding simple statements. ? Sudden problems with walking or balance. ? A sudden, severe headache that is different from past headaches.     · You passed out (lost consciousness).    Call your doctor now or seek immediate medical care if:    · You have new or increased shortness of breath.     · You feel dizzy or lightheaded, or you feel like you may faint.     · Your heart rate becomes irregular.     · You can feel your heart flutter in your chest or skip heartbeats. Tell your doctor if these symptoms are new or worse.    Watch closely for changes in your health, and be sure to contact your doctor if you have any problems. Where can you learn more? Go to http://leland-michael.info/. Enter U020 in the search box to learn more about \"Atrial Fibrillation: Care Instructions. \"  Current as of: April 9, 2019  Content Version: 12.2  © 3959-6666 [employer]. Care instructions adapted under license by [employer] (which disclaims liability or warranty for this information). If you have questions about a medical condition or this instruction, always ask your healthcare professional. [address] any warranty or liability for your use of this information. Patient Education        Palpitations: Care Instructions  Your Care Instructions    Heart palpitations are the uncomfortable sensation that your heart is beating fast or irregularly. You might feel pounding or fluttering in your chest. It might feel like your heart is skipping a beat. Although palpitations may be caused by a heart problem, they also occur because of stress, fatigue, or use of alcohol, caffeine, or nicotine.  Many medicines, including diet pills, antihistamines, decongestants, and some herbal products, can cause heart palpitations. Nearly everyone has palpitations from time to time. Depending on your symptoms, your doctor may need to do more tests to try to find the cause of your palpitations. Follow-up care is a key part of your treatment and safety. Be sure to make and go to all appointments, and call your doctor if you are having problems. It's also a good idea to know your test results and keep a list of the medicines you take. How can you care for yourself at home? · Avoid caffeine, nicotine, and excess alcohol. · Do not take illegal drugs, such as methamphetamines and cocaine. · Do not take weight loss or diet medicines unless you talk with your doctor first.  · Get plenty of sleep. · Do not overeat. · If you have palpitations again, take deep breaths and try to relax. This may slow a racing heart. · If you start to feel lightheaded, lie down to avoid injuries that might result if you pass out and fall down. · Keep a record of your palpitations and bring it to your next doctor's appointment. Write down:  ? The date and time. ? Your pulse. (If your heart is beating fast, it may be hard to count your pulse.)  ? What you were doing when the palpitations started. ? How long the palpitations lasted. ? Any other symptoms. · If an activity causes palpitations, slow down or stop. Talk to your doctor before you do that activity again. · Take your medicines exactly as prescribed. Call your doctor if you think you are having a problem with your medicine. When should you call for help? Call 911 anytime you think you may need emergency care. For example, call if:    · You passed out (lost consciousness).     · You have symptoms of a heart attack. These may include:  ? Chest pain or pressure, or a strange feeling in the chest.  ? Sweating. ? Shortness of breath.   ? Pain, pressure, or a strange feeling in the back, neck, jaw, or upper belly or in one or both shoulders or arms. ? Lightheadedness or sudden weakness. ? A fast or irregular heartbeat. After you call 911, the  may tell you to chew 1 adult-strength or 2 to 4 low-dose aspirin. Wait for an ambulance. Do not try to drive yourself.     · You have symptoms of a stroke. These may include:  ? Sudden numbness, tingling, weakness, or loss of movement in your face, arm, or leg, especially on only one side of your body. ? Sudden vision changes. ? Sudden trouble speaking. ? Sudden confusion or trouble understanding simple statements. ? Sudden problems with walking or balance. ? A sudden, severe headache that is different from past headaches.    Call your doctor now or seek immediate medical care if:    · You have heart palpitations and:  ? Are dizzy or lightheaded, or you feel like you may faint. ? Have new or increased shortness of breath.    Watch closely for changes in your health, and be sure to contact your doctor if:    · You continue to have heart palpitations. Where can you learn more? Go to http://leland-michael.info/. Enter R508 in the search box to learn more about \"Palpitations: Care Instructions. \"  Current as of: April 9, 2019  Content Version: 12.2  © 8917-1950 [employer]. Care instructions adapted under license by [employer] (which disclaims liability or warranty for this information). If you have questions about a medical condition or this instruction, always ask your healthcare professional. [place] [address] any warranty or liability for your use of this information.

## 2020-01-07 RX ORDER — APIXABAN 5 MG/1
TABLET, FILM COATED ORAL
Qty: 60 TAB | Refills: 6 | Status: SHIPPED | OUTPATIENT
Start: 2020-01-07 | End: 2020-08-10 | Stop reason: SDUPTHER

## 2020-07-07 ENCOUNTER — HOSPITAL ENCOUNTER (OUTPATIENT)
Dept: LAB | Age: 60
Discharge: HOME | End: 2020-07-07
Payer: COMMERCIAL

## 2020-07-07 LAB
ALBUMIN SERPL-MCNC: 3.2 G/DL (ref 3.4–5)
ALBUMIN/GLOB SERPL: 0.9 {RATIO} (ref 0.8–1.7)
ALP SERPL-CCNC: 90 U/L (ref 45–117)
ALT SERPL-CCNC: 36 U/L (ref 13–56)
ANION GAP SERPL CALC-SCNC: 3 MMOL/L (ref 3–18)
AST SERPL-CCNC: 39 U/L (ref 10–38)
BASOPHILS # BLD: 0 K/UL (ref 0–0.1)
BASOPHILS NFR BLD: 1 % (ref 0–2)
BILIRUB SERPL-MCNC: 0.7 MG/DL (ref 0.2–1)
BUN SERPL-MCNC: 17 MG/DL (ref 7–18)
BUN/CREAT SERPL: 24 (ref 12–20)
CALCIUM SERPL-MCNC: 8.3 MG/DL (ref 8.5–10.1)
CHLORIDE SERPL-SCNC: 111 MMOL/L (ref 100–111)
CHOLEST SERPL-MCNC: 171 MG/DL
CO2 SERPL-SCNC: 27 MMOL/L (ref 21–32)
CREAT SERPL-MCNC: 0.7 MG/DL (ref 0.6–1.3)
DIFFERENTIAL METHOD BLD: ABNORMAL
EOSINOPHIL # BLD: 0.5 K/UL (ref 0–0.4)
EOSINOPHIL NFR BLD: 14 % (ref 0–5)
ERYTHROCYTE [DISTWIDTH] IN BLOOD BY AUTOMATED COUNT: 17.7 % (ref 11.6–14.5)
FERRITIN SERPL-MCNC: 3 NG/ML (ref 8–388)
GLOBULIN SER CALC-MCNC: 3.4 G/DL (ref 2–4)
GLUCOSE SERPL-MCNC: 83 MG/DL (ref 74–99)
HCT VFR BLD AUTO: 24.7 % (ref 35–45)
HDLC SERPL-MCNC: 61 MG/DL (ref 40–60)
HDLC SERPL: 2.8 {RATIO} (ref 0–5)
HGB BLD-MCNC: 7.1 G/DL (ref 12–16)
IRON SATN MFR SERPL: 4 % (ref 20–50)
IRON SERPL-MCNC: 16 UG/DL (ref 50–175)
LDLC SERPL CALC-MCNC: 103.2 MG/DL (ref 0–100)
LIPID PROFILE,FLP: ABNORMAL
LYMPHOCYTES # BLD: 1.4 K/UL (ref 0.9–3.6)
LYMPHOCYTES NFR BLD: 39 % (ref 21–52)
MCH RBC QN AUTO: 20.6 PG (ref 24–34)
MCHC RBC AUTO-ENTMCNC: 28.7 G/DL (ref 31–37)
MCV RBC AUTO: 71.8 FL (ref 74–97)
MONOCYTES # BLD: 0.2 K/UL (ref 0.05–1.2)
MONOCYTES NFR BLD: 7 % (ref 3–10)
NEUTS SEG # BLD: 1.4 K/UL (ref 1.8–8)
NEUTS SEG NFR BLD: 39 % (ref 40–73)
PLATELET # BLD AUTO: 310 K/UL (ref 135–420)
PLATELET COMMENTS,PCOM: ABNORMAL
PMV BLD AUTO: 9.3 FL (ref 9.2–11.8)
POTASSIUM SERPL-SCNC: 4.4 MMOL/L (ref 3.5–5.5)
PROT SERPL-MCNC: 6.6 G/DL (ref 6.4–8.2)
RBC # BLD AUTO: 3.44 M/UL (ref 4.2–5.3)
RBC MORPH BLD: ABNORMAL
SODIUM SERPL-SCNC: 141 MMOL/L (ref 136–145)
T4 SERPL-MCNC: 10.9 UG/DL (ref 4.8–13.9)
TIBC SERPL-MCNC: 414 UG/DL (ref 250–450)
TRIGL SERPL-MCNC: 34 MG/DL (ref ?–150)
TSH SERPL DL<=0.05 MIU/L-ACNC: 1.45 UIU/ML (ref 0.36–3.74)
VLDLC SERPL CALC-MCNC: 6.8 MG/DL
WBC # BLD AUTO: 3.5 K/UL (ref 4.6–13.2)

## 2020-07-07 PROCEDURE — 80061 LIPID PANEL: CPT

## 2020-07-07 PROCEDURE — 80053 COMPREHEN METABOLIC PANEL: CPT

## 2020-07-07 PROCEDURE — 84436 ASSAY OF TOTAL THYROXINE: CPT

## 2020-07-07 PROCEDURE — 83540 ASSAY OF IRON: CPT

## 2020-07-07 PROCEDURE — 36415 COLL VENOUS BLD VENIPUNCTURE: CPT

## 2020-07-07 PROCEDURE — 82728 ASSAY OF FERRITIN: CPT

## 2020-07-07 PROCEDURE — 85025 COMPLETE CBC W/AUTO DIFF WBC: CPT

## 2020-08-06 ENCOUNTER — HOSPITAL ENCOUNTER (EMERGENCY)
Age: 60
Discharge: HOME | End: 2020-08-06
Attending: EMERGENCY MEDICINE
Payer: COMMERCIAL

## 2020-08-06 ENCOUNTER — APPOINTMENT (OUTPATIENT)
Dept: GENERAL RADIOLOGY | Age: 60
End: 2020-08-06
Attending: PHYSICIAN ASSISTANT
Payer: COMMERCIAL

## 2020-08-06 ENCOUNTER — APPOINTMENT (OUTPATIENT)
Dept: GENERAL RADIOLOGY | Age: 60
End: 2020-08-06
Attending: EMERGENCY MEDICINE
Payer: COMMERCIAL

## 2020-08-06 VITALS
SYSTOLIC BLOOD PRESSURE: 115 MMHG | DIASTOLIC BLOOD PRESSURE: 66 MMHG | HEART RATE: 94 BPM | TEMPERATURE: 98.2 F | OXYGEN SATURATION: 100 % | RESPIRATION RATE: 24 BRPM

## 2020-08-06 DIAGNOSIS — R00.2 PALPITATIONS: ICD-10-CM

## 2020-08-06 DIAGNOSIS — I47.1 SVT (SUPRAVENTRICULAR TACHYCARDIA) (HCC): Primary | ICD-10-CM

## 2020-08-06 LAB
ALBUMIN SERPL-MCNC: 3.6 G/DL (ref 3.4–5)
ALBUMIN/GLOB SERPL: 0.8 {RATIO} (ref 0.8–1.7)
ALP SERPL-CCNC: 106 U/L (ref 45–117)
ALT SERPL-CCNC: 44 U/L (ref 13–56)
ANION GAP SERPL CALC-SCNC: 9 MMOL/L (ref 3–18)
AST SERPL-CCNC: 43 U/L (ref 10–38)
ATRIAL RATE: 178 BPM
BASOPHILS # BLD: 0 K/UL (ref 0–0.1)
BASOPHILS NFR BLD: 1 % (ref 0–2)
BILIRUB SERPL-MCNC: 0.5 MG/DL (ref 0.2–1)
BUN SERPL-MCNC: 14 MG/DL (ref 7–18)
BUN/CREAT SERPL: 14 (ref 12–20)
CALCIUM SERPL-MCNC: 8.8 MG/DL (ref 8.5–10.1)
CALCULATED R AXIS, ECG10: 24 DEGREES
CALCULATED T AXIS, ECG11: 48 DEGREES
CHLORIDE SERPL-SCNC: 110 MMOL/L (ref 100–111)
CK MB CFR SERPL CALC: 1.2 % (ref 0–4)
CK MB SERPL-MCNC: 3.5 NG/ML (ref 5–25)
CK SERPL-CCNC: 302 U/L (ref 26–192)
CO2 SERPL-SCNC: 21 MMOL/L (ref 21–32)
CREAT SERPL-MCNC: 0.99 MG/DL (ref 0.6–1.3)
DIAGNOSIS, 93000: NORMAL
DIFFERENTIAL METHOD BLD: ABNORMAL
EOSINOPHIL # BLD: 0.6 K/UL (ref 0–0.4)
EOSINOPHIL NFR BLD: 12 % (ref 0–5)
ERYTHROCYTE [DISTWIDTH] IN BLOOD BY AUTOMATED COUNT: 17.8 % (ref 11.6–14.5)
GLOBULIN SER CALC-MCNC: 4.3 G/DL (ref 2–4)
GLUCOSE SERPL-MCNC: 110 MG/DL (ref 74–99)
HCT VFR BLD AUTO: 26 % (ref 35–45)
HGB BLD-MCNC: 7.5 G/DL (ref 12–16)
LYMPHOCYTES # BLD: 2.5 K/UL (ref 0.9–3.6)
LYMPHOCYTES NFR BLD: 45 % (ref 21–52)
MCH RBC QN AUTO: 20.2 PG (ref 24–34)
MCHC RBC AUTO-ENTMCNC: 28.8 G/DL (ref 31–37)
MCV RBC AUTO: 70.1 FL (ref 74–97)
MONOCYTES # BLD: 0.2 K/UL (ref 0.05–1.2)
MONOCYTES NFR BLD: 4 % (ref 3–10)
NEUTS SEG # BLD: 2.1 K/UL (ref 1.8–8)
NEUTS SEG NFR BLD: 38 % (ref 40–73)
PLATELET # BLD AUTO: 374 K/UL (ref 135–420)
PMV BLD AUTO: 9.3 FL (ref 9.2–11.8)
POTASSIUM SERPL-SCNC: 4 MMOL/L (ref 3.5–5.5)
PROT SERPL-MCNC: 7.9 G/DL (ref 6.4–8.2)
Q-T INTERVAL, ECG07: 278 MS
QRS DURATION, ECG06: 68 MS
QTC CALCULATION (BEZET), ECG08: 461 MS
RBC # BLD AUTO: 3.71 M/UL (ref 4.2–5.3)
SODIUM SERPL-SCNC: 140 MMOL/L (ref 136–145)
TROPONIN I SERPL-MCNC: <0.02 NG/ML (ref 0–0.04)
TSH SERPL DL<=0.05 MIU/L-ACNC: 2.25 UIU/ML (ref 0.36–3.74)
VENTRICULAR RATE, ECG03: 166 BPM
WBC # BLD AUTO: 5.5 K/UL (ref 4.6–13.2)

## 2020-08-06 PROCEDURE — 80053 COMPREHEN METABOLIC PANEL: CPT

## 2020-08-06 PROCEDURE — 99285 EMERGENCY DEPT VISIT HI MDM: CPT

## 2020-08-06 PROCEDURE — 96374 THER/PROPH/DIAG INJ IV PUSH: CPT

## 2020-08-06 PROCEDURE — 74011250636 HC RX REV CODE- 250/636

## 2020-08-06 PROCEDURE — 71045 X-RAY EXAM CHEST 1 VIEW: CPT

## 2020-08-06 PROCEDURE — 85025 COMPLETE CBC W/AUTO DIFF WBC: CPT

## 2020-08-06 PROCEDURE — 84443 ASSAY THYROID STIM HORMONE: CPT

## 2020-08-06 PROCEDURE — 82550 ASSAY OF CK (CPK): CPT

## 2020-08-06 PROCEDURE — 93005 ELECTROCARDIOGRAM TRACING: CPT

## 2020-08-06 RX ORDER — ADENOSINE 3 MG/ML
INJECTION, SOLUTION INTRAVENOUS
Status: COMPLETED
Start: 2020-08-06 | End: 2020-08-06

## 2020-08-06 RX ORDER — ADENOSINE 3 MG/ML
6 INJECTION, SOLUTION INTRAVENOUS
Status: COMPLETED | OUTPATIENT
Start: 2020-08-06 | End: 2020-08-06

## 2020-08-06 RX ORDER — ADENOSINE 3 MG/ML
12 INJECTION, SOLUTION INTRAVENOUS
Status: COMPLETED | OUTPATIENT
Start: 2020-08-06 | End: 2020-08-06

## 2020-08-06 RX ADMIN — ADENOSINE 6 MG: 3 INJECTION, SOLUTION INTRAVENOUS at 13:14

## 2020-08-06 RX ADMIN — ADENOSINE 12 MG: 3 INJECTION, SOLUTION INTRAVENOUS at 13:16

## 2020-08-06 NOTE — ED PROVIDER NOTES
EMERGENCY DEPARTMENT HISTORY AND PHYSICAL EXAM    1:32 PM      Date: 8/6/2020  Patient Name: Jocelynn Hansen    History of Presenting Illness     Chief Complaint   Patient presents with    Palpitations         History Provided By: Patient    Additional History (Context): Jocelynn Hansen is a 64 y.o. female with hypertension and Atrial fibrillation who presents with palpitations that started while at work prior to arrival.  Patient was just passing some food trays. Her heart started racing. Patient has mild chest pain that has eased now. She also complains of shortness of breath. Her cardiologist is Dr. Bernie Reyez. She denies nausea, vomiting, diarrhea or cough. Patient has had episodes in the past like this, the most recent one in December. Patient denies smoking, alcohol recreational drug use. PCP: Rogelio Gray MD      Current Outpatient Medications   Medication Sig Dispense Refill    ELIQUIS 5 mg tablet TAKE 1 TABLET BY MOUTH TWO TIMES A DAY 60 Tab 6    diclofenac EC (VOLTAREN) 75 mg EC tablet Take 75 mg by mouth two (2) times a day.  metoprolol tartrate (LOPRESSOR) 25 mg tablet Take 1 Tab by mouth two (2) times a day. As needed 60 Tab 3    cholecalciferol, vitamin D3, (VITAMIN D3) 2,000 unit tab Take  by mouth daily.  meclizine (ANTIVERT) 25 mg tablet Take  by mouth three (3) times daily as needed.  amLODIPine (NORVASC) 5 mg tablet Take 5 mg by mouth daily. Past History     Past Medical History:  Past Medical History:   Diagnosis Date    Arthritis     bl knees     Atrial fibrillation ([address]     Essential hypertension        Past Surgical History:  History reviewed. No pertinent surgical history.     Family History:  Family History   Problem Relation Age of Onset    Cancer Mother     Diabetes Mother     Cancer Father     Heart Attack Brother        Social History:  Social History     Tobacco Use    Smoking status: Never Smoker    Smokeless tobacco: Never Used Substance Use Topics    Alcohol use: No    Drug use: No       Allergies:  No Known Allergies      Review of Systems       Review of Systems   Constitutional: Negative. Negative for chills, diaphoresis and fever. HENT: Negative. Negative for congestion, rhinorrhea and sore throat. Eyes: Negative. Negative for pain, discharge and redness. Respiratory: Positive for shortness of breath. Negative for cough, chest tightness and wheezing. Cardiovascular: Positive for chest pain and palpitations. Gastrointestinal: Negative. Negative for abdominal pain, constipation, diarrhea, nausea and vomiting. Genitourinary: Negative. Negative for dysuria, flank pain, frequency, hematuria and urgency. Musculoskeletal: Negative. Negative for back pain and neck pain. Skin: Negative. Negative for rash. Neurological: Negative. Negative for syncope, weakness, numbness and headaches. Psychiatric/Behavioral: Negative. All other systems reviewed and are negative. Physical Exam     Visit Vitals  /70   Pulse 95   Temp 98.2 °F (36.8 °C)   Resp 20   SpO2 100%         Physical Exam  Vitals signs and nursing note reviewed. Constitutional:       General: She is not in acute distress. Appearance: Normal appearance. She is well-developed. She is not ill-appearing, toxic-appearing or diaphoretic. HENT:      Head: Normocephalic and atraumatic. Mouth/Throat:      Pharynx: No oropharyngeal exudate. Eyes:      General: No scleral icterus. Conjunctiva/sclera: Conjunctivae normal.      Pupils: Pupils are equal, round, and reactive to light. Neck:      Musculoskeletal: Normal range of motion and neck supple. Thyroid: No thyromegaly. Vascular: No hepatojugular reflux or JVD. Trachea: No tracheal deviation. Cardiovascular:      Rate and Rhythm: Regular rhythm. Tachycardia present. Pulses: Normal pulses.            Radial pulses are 2+ on the right side and 2+ on the left side.        Dorsalis pedis pulses are 2+ on the right side and 2+ on the left side. Heart sounds: Normal heart sounds, S1 normal and S2 normal. No murmur. No gallop. No S3 or S4 sounds. Pulmonary:      Effort: Pulmonary effort is normal. No respiratory distress. Breath sounds: Normal breath sounds. No decreased breath sounds, wheezing, rhonchi or rales. Abdominal:      General: Bowel sounds are normal. There is no distension. Palpations: Abdomen is soft. Abdomen is not rigid. There is no mass. Tenderness: There is no abdominal tenderness. There is no guarding or rebound. Negative signs include Pat's sign and McBurney's sign. Musculoskeletal: Normal range of motion. Comments: Strength 5 out of 5 throughout. Lymphadenopathy:      Head:      Right side of head: No submental, submandibular, preauricular or occipital adenopathy. Left side of head: No submental, submandibular, preauricular or occipital adenopathy. Cervical: No cervical adenopathy. Upper Body:      Right upper body: No supraclavicular adenopathy. Left upper body: No supraclavicular adenopathy. Skin:     General: Skin is warm and dry. Findings: No rash. Neurological:      Mental Status: She is alert. She is not disoriented. GCS: GCS eye subscore is 4. GCS verbal subscore is 5. GCS motor subscore is 6. Cranial Nerves: No cranial nerve deficit. Sensory: No sensory deficit. Coordination: Coordination normal.      Gait: Gait normal.      Deep Tendon Reflexes: Reflexes are normal and symmetric. Comments: Grossly intact. Psychiatric:         Speech: Speech normal.         Behavior: Behavior normal.         Thought Content:  Thought content normal.         Judgment: Judgment normal.           Diagnostic Study Results     Labs -  Recent Results (from the past 12 hour(s))   EKG, 12 LEAD, INITIAL    Collection Time: 08/06/20  1:11 PM   Result Value Ref Range    Ventricular Rate 166 BPM    Atrial Rate 178 BPM    QRS Duration 68 ms    Q-T Interval 278 ms    QTC Calculation (Bezet) 461 ms    Calculated R Axis 24 degrees    Calculated T Axis 48 degrees    Diagnosis       Supraventricular tachycardia  Marked ST abnormality, possible inferior subendocardial injury  Abnormal ECG  When compared with ECG of 14-DEC-2019 16:28,  Sinus rhythm has replaced Atrial fibrillation  ST more depressed in Inferior leads  ST now depressed in Anterolateral leads  Nonspecific T wave abnormality no longer evident in Inferior leads     CBC WITH AUTOMATED DIFF    Collection Time: 08/06/20  1:13 PM   Result Value Ref Range    WBC 5.5 4.6 - 13.2 K/uL    RBC 3.71 (L) 4.20 - 5.30 M/uL    HGB 7.5 (L) 12.0 - 16.0 g/dL    HCT 26.0 (L) 35.0 - 45.0 %    MCV 70.1 (L) 74.0 - 97.0 FL    MCH 20.2 (L) 24.0 - 34.0 PG    MCHC 28.8 (L) 31.0 - 37.0 g/dL    RDW 17.8 (H) 11.6 - 14.5 %    PLATELET 301 617 - 560 K/uL    MPV 9.3 9.2 - 11.8 FL    NEUTROPHILS 38 (L) 40 - 73 %    LYMPHOCYTES 45 21 - 52 %    MONOCYTES 4 3 - 10 %    EOSINOPHILS 12 (H) 0 - 5 %    BASOPHILS 1 0 - 2 %    ABS. NEUTROPHILS 2.1 1.8 - 8.0 K/UL    ABS. LYMPHOCYTES 2.5 0.9 - 3.6 K/UL    ABS. MONOCYTES 0.2 0.05 - 1.2 K/UL    ABS. EOSINOPHILS 0.6 (H) 0.0 - 0.4 K/UL    ABS. BASOPHILS 0.0 0.0 - 0.1 K/UL    DF AUTOMATED     METABOLIC PANEL, COMPREHENSIVE    Collection Time: 08/06/20  1:13 PM   Result Value Ref Range    Sodium 140 136 - 145 mmol/L    Potassium 4.0 3.5 - 5.5 mmol/L    Chloride 110 100 - 111 mmol/L    CO2 21 21 - 32 mmol/L    Anion gap 9 3.0 - 18 mmol/L    Glucose 110 (H) 74 - 99 mg/dL    BUN 14 7.0 - 18 MG/DL    Creatinine 0.99 0.6 - 1.3 MG/DL    BUN/Creatinine ratio 14 12 - 20      GFR est AA >60 >60 ml/min/1.73m2    GFR est non-AA 58 (L) >60 ml/min/1.73m2    Calcium 8.8 8.5 - 10.1 MG/DL    Bilirubin, total 0.5 0.2 - 1.0 MG/DL    ALT (SGPT) 44 13 - 56 U/L    AST (SGOT) 43 (H) 10 - 38 U/L    Alk.  phosphatase 106 45 - 117 U/L    Protein, total 7.9 6.4 - 8.2 g/dL    Albumin 3.6 3.4 - 5.0 g/dL    Globulin 4.3 (H) 2.0 - 4.0 g/dL    A-G Ratio 0.8 0.8 - 1.7     CARDIAC PANEL,(CK, CKMB & TROPONIN)    Collection Time: 08/06/20  1:13 PM   Result Value Ref Range    CK - MB 3.5 <3.6 ng/ml    CK-MB Index 1.2 0.0 - 4.0 %     (H) 26 - 192 U/L    Troponin-I, QT <0.02 0.0 - 0.045 NG/ML   TSH 3RD GENERATION    Collection Time: 08/06/20  1:13 PM   Result Value Ref Range    TSH 2.25 0.36 - 3.74 uIU/mL       Radiologic Studies -   XR CHEST PORT    (Results Pending)         Medical Decision Making   Provider Notes (Medical Decision Making):  MDM  Number of Diagnoses or Management Options  Diagnosis management comments: DIFFERENTIAL DIAGNOSES/ MEDICAL DECISION MAKING:  Chest pain etiologies include acute cardiac events to include possible acute myocardial infarction, acute coronary syndrome, pneumonia, chest wall pain (myofascial/ musculoskeletal etiology), chronic obstructive pulmonary disease (copd), acute asthma exacerbation, congestive heart failure, acute bronchitis, pulmonary embolism, upper respiratory infection, referred abdominal pain, other etiologies, versus combination of the above. I am the first provider for this patient. I reviewed the vital signs, available nursing notes, past medical history, past surgical history, family history and social history. Vital Signs-Reviewed the patient's vital signs. Records Reviewed: Nursing Notes (Time of Review: 1:32 PM)    ED Course: Progress Notes, Reevaluation, and Consults:    Labs essentially normal.  Chest X-Ray showed No acute process. EKG showed NSR with rate of 166 bpm. With no ST elevations or depression and non specific T wave changes. 2:12 PM 8/6/2020    Consult:  Discussed care with [facility] [place]. Cardiology. Standard discussion; including history of patients chief complaint, available diagnostic results, and treatment course. Agrees with discharge.   Patient to see Dr. Rj Interiano office on Monday the 10th at 3:30 PM.  2:26 PM        Diagnosis       I have reassessed the patient. Patient is feeling much better. Patient was discharged in stable condition. Patient is to return to emergency department if any new or worsening condition. Clinical Impression:   1. SVT (supraventricular tachycardia) (HCC)    2. Palpitations        Disposition: Discharged home     Follow-up Information     Follow up With Specialties Details Why Contact Info    Cong Shearer MD Internal Medicine In 2 days  [address] [facility]  [phone]      Rosario Pascal MD Cardiology  Monday 10th at 3:30PM  [address] [place] [address]  [phone]               Attestation        Provider Attestation:     I personally performed the services described in the documentation, reviewed the documentation and it accurately and completely records my words and actions utilizing the [address] August 06, 2020 at 2:28 PM - Jimbo Thakur DO    Disclaimer. It is dictated using utilizing voice recognition software. Unfortunately this leads to occasional typographical errors. I apologize in advance if the situation occurs. If questions arise please do not hesitate to contact me or call our department.

## 2020-08-06 NOTE — DISCHARGE INSTRUCTIONS
Patient Education        Chemical Cardioversion: Care Instructions  Your Care Instructions     Cardioversion resets your heart's rhythm to its normal pattern. It treats heart rhythm problems like atrial fibrillation or supraventricular tachycardia. Chemical cardioversion uses rhythm-control medicines to reset your heart. They can also help keep your heart in a normal rhythm after it has been reset. You may take this medicine as pills. Or you may get it in your arm through a tube called an IV. If you have an IV, it will be done in the hospital. If you use the pills, you might start them in the hospital. Or you might start the pills at home. Your doctor may ask you to take other medicines before your cardioversion. They can help keep blood clots from forming. And they can prevent the heart-rate problem from coming back. Sometimes the heart rate doesn't go back to normal. Or it may reset for a while and then go back to an uneven rate. If this happens, you may need electrical cardioversion. Follow-up care is a key part of your treatment and safety. Be sure to make and go to all appointments, and call your doctor if you are having problems. It's also a good idea to know your test results and keep a list of the medicines you take. How can you care for yourself at home? · Talk to your doctor about the benefits and risks of these medicines. They might cause serious side effects. Your doctor will want to see you often. Be sure to go to all of your doctor visits. · Take your medicines exactly as prescribed. Call your doctor if you think you are having a problem with your medicine. · Check with your doctor or pharmacist before you use any other medicines. This includes over-the-counter medicines. Make sure your doctor knows all of the medicines, vitamins, herbal products, and supplements you take. Taking some medicines together can cause problems. When should you call for help?    [id number] anytime you think you may need emergency care. For example, call if:  · You passed out (lost consciousness). · You have chest pain or pressure. This may occur with:  ? Sweating. ? Shortness of breath. ? Nausea or vomiting. ? Pain, pressure, or a strange feeling in your back, neck, jaw, or upper belly or in one or both shoulders or arms. ? Lightheadedness or sudden weakness. ? A fast or irregular heartbeat. After you call 911, the  may tell you to chew 1 adult-strength or 2 to 4 low-dose aspirin. Wait for an ambulance. Do not try to drive yourself. · You have symptoms of a stroke. These may include:  ? Sudden numbness, tingling, weakness, or loss of movement in your face, arm, or leg, especially on only one side of your body. ? Sudden vision changes. ? Sudden trouble speaking. ? Sudden confusion or trouble understanding simple statements. ? Sudden problems with walking or balance. ? A sudden, severe headache that is different from past headaches. Call your doctor now or seek immediate medical care if:  · You are dizzy or lightheaded, or you feel like you may faint. · You have a fast or irregular heartbeat. Watch closely for changes in your health, and be sure to contact your doctor if you are having any problems. Where can you learn more? Go to http://leland-michael.info/  Enter T836 in the search box to learn more about \"Chemical Cardioversion: Care Instructions. \"  Current as of: December 16, 2019               Content Version: 12.5  © 2655-8187 Healthwise, Incorporated. Care instructions adapted under license by [employer] (which disclaims liability or warranty for this information). If you have questions about a medical condition or this instruction, always ask your healthcare professional. [place] [address] any warranty or liability for your use of this information.          Patient Education        Palpitations: Care Instructions  Your Care Instructions     Heart palpitations are the uncomfortable sensation that your heart is beating fast or irregularly. You might feel pounding or fluttering in your chest. It might feel like your heart is skipping a beat. Although palpitations may be caused by a heart problem, they also occur because of stress, fatigue, or use of alcohol, caffeine, or nicotine. Many medicines, including diet pills, antihistamines, decongestants, and some herbal products, can cause heart palpitations. Nearly everyone has palpitations from time to time. Depending on your symptoms, your doctor may need to do more tests to try to find the cause of your palpitations. Follow-up care is a key part of your treatment and safety. Be sure to make and go to all appointments, and call your doctor if you are having problems. It's also a good idea to know your test results and keep a list of the medicines you take. How can you care for yourself at home? · Avoid caffeine, nicotine, and excess alcohol. · Do not take illegal drugs, such as methamphetamines and cocaine. · Do not take weight loss or diet medicines unless you talk with your doctor first.  · Get plenty of sleep. · Do not overeat. · If you have palpitations again, take deep breaths and try to relax. This may slow a racing heart. · If you start to feel lightheaded, lie down to avoid injuries that might result if you pass out and fall down. · Keep a record of your palpitations and bring it to your next doctor's appointment. Write down:  ? The date and time. ? Your pulse. (If your heart is beating fast, it may be hard to count your pulse.)  ? What you were doing when the palpitations started. ? How long the palpitations lasted. ? Any other symptoms. · If an activity causes palpitations, slow down or stop. Talk to your doctor before you do that activity again. · Take your medicines exactly as prescribed. Call your doctor if you think you are having a problem with your medicine.   When should you call for help? [id number] anytime you think you may need emergency care. For example, call if:  · You passed out (lost consciousness). · You have symptoms of a heart attack. These may include:  ? Chest pain or pressure, or a strange feeling in the chest.  ? Sweating. ? Shortness of breath. ? Pain, pressure, or a strange feeling in the back, neck, jaw, or upper belly or in one or both shoulders or arms. ? Lightheadedness or sudden weakness. ? A fast or irregular heartbeat. After you call 911, the  may tell you to chew 1 adult-strength or 2 to 4 low-dose aspirin. Wait for an ambulance. Do not try to drive yourself. · You have symptoms of a stroke. These may include:  ? Sudden numbness, tingling, weakness, or loss of movement in your face, arm, or leg, especially on only one side of your body. ? Sudden vision changes. ? Sudden trouble speaking. ? Sudden confusion or trouble understanding simple statements. ? Sudden problems with walking or balance. ? A sudden, severe headache that is different from past headaches. Call your doctor now or seek immediate medical care if:  · You have heart palpitations and:  ? Are dizzy or lightheaded, or you feel like you may faint. ? Have new or increased shortness of breath. Watch closely for changes in your health, and be sure to contact your doctor if:  · You continue to have heart palpitations. Where can you learn more? Go to http://leland-michael.info/  Enter R508 in the search box to learn more about \"Palpitations: Care Instructions. \"  Current as of: December 16, 2019               Content Version: 12.5  © 1155-8777 Healthwise, Incorporated. Care instructions adapted under license by [employer] (which disclaims liability or warranty for this information).  If you have questions about a medical condition or this instruction, always ask your healthcare professional. [place] [address] any warranty or liability for your use of this information.

## 2020-08-06 NOTE — ED TRIAGE NOTES
Pt presents via triage from work with complaints of heart palpitations aprox 20 minutes ago. Pt reports a history of heart palpitations but today it is worse. Pt sees Dr. Laly Guzman.     Past Medical History:   Diagnosis Date    Arthritis     bl knees     Atrial fibrillation (HCC)     Essential hypertension

## 2020-08-07 LAB
ATRIAL RATE: 97 BPM
CALCULATED P AXIS, ECG09: 109 DEGREES
CALCULATED R AXIS, ECG10: 1 DEGREES
CALCULATED T AXIS, ECG11: 27 DEGREES
DIAGNOSIS, 93000: NORMAL
P-R INTERVAL, ECG05: 114 MS
Q-T INTERVAL, ECG07: 374 MS
QRS DURATION, ECG06: 86 MS
QTC CALCULATION (BEZET), ECG08: 474 MS
VENTRICULAR RATE, ECG03: 97 BPM

## 2020-08-07 NOTE — PROGRESS NOTES
Susan Noel presents today for a post-ER follow-up. She presented to the ER on 8/6/20 with complaints of palpitations (heart racing), mild chest discomfort, and shortness of breath that began at work. Her EKG showed SVT with heart rate of 166 bpm.  She reported that this was similar to an ER visit in Dec. 2019. She received 2 doses of adenosine and she converted back to sinus rhythm. She is a 57-year-old -American female with history of hypertension, atrial fibrillation, and mild to moderate spinal stenosis. Her last echocardiogram was done in April 2018 it showed ejection fraction of 55-60% and no wall motion abnormalities. She had a pharmacologic nuclear stress test done in April 2018 and it was considered normal and low risk. There were no perfusion imaging abnormalities concerning for ischemia or infarct. She states that a week before the ER visit, she experienced an episode of shortness of breath and fatigue which resolved on its own. Prior to the episode that brought her to the ER, she had not noticed any heart racing for a few months. She states that she had a brief episode of palpitations this morning that resolved after a few minutes. Denies chest pain, tightness, heaviness, and admits to palpitations. Denies shortness of breath at rest, admits to mild dyspnea on exertion, denies orthopnea and PND. Denies abdominal bloating. Denies lightheadedness, dizziness, and syncope. Admits to mild lower extremity edema by the end of the day, admits to varicosities in her legs and denies claudication. Denies nausea, vomiting, diarrhea, melena, hematochezia. Denies hematuria, urgency, frequency. Denies fever, chills. While reviewing her medications, she states that she has not been taking any metoprolol tartrate (as needed) as she does not have any at home.       PMH:  Past Medical History:   Diagnosis Date    Arthritis     bl knees     Atrial fibrillation ([address]     Essential hypertension        PSH:  No past surgical history on file. MEDS:  Current Outpatient Medications   Medication Sig    ELIQUIS 5 mg tablet TAKE 1 TABLET BY MOUTH TWO TIMES A DAY    diclofenac EC (VOLTAREN) 75 mg EC tablet Take 75 mg by mouth two (2) times a day.  cholecalciferol, vitamin D3, (VITAMIN D3) 2,000 unit tab Take  by mouth daily.  meclizine (ANTIVERT) 25 mg tablet Take  by mouth three (3) times daily as needed.  amLODIPine (NORVASC) 5 mg tablet Take 5 mg by mouth daily. No current facility-administered medications for this visit. Allergies and Sensitivities:  No Known Allergies    Family History:  Family History   Problem Relation Age of Onset    Cancer Mother     Diabetes Mother     Cancer Father     Heart Attack Brother        Social History:  She  reports that she has never smoked. She has never used smokeless tobacco.  She  reports no history of alcohol use. Physical:  Visit Vitals  /76 (BP 1 Location: Left arm, BP Patient Position: Sitting)   Pulse 90   Ht 5' 2\" (1.575 m)   Wt 101.6 kg (224 lb)   SpO2 100%   BMI 40.97 kg/m²       Exam:  Neck:  Supple, no JVD, no carotid bruits  CV:  Normal S1 and  S2, no murmurs, rubs, or gallops noted  Lungs:  Clear to ausculation throughout, no wheezes or rales  Abd:  Soft, non-tender, non-distended with good bowel sounds.   No hepatosplenomegaly  Extremities:  No edema      Data:  EKG:          LABS:  Lab Results   Component Value Date/Time    Sodium 140 08/06/2020 01:13 PM    Potassium 4.0 08/06/2020 01:13 PM    Chloride 110 08/06/2020 01:13 PM    CO2 21 08/06/2020 01:13 PM    Glucose 110 (H) 08/06/2020 01:13 PM    BUN 14 08/06/2020 01:13 PM    Creatinine 0.99 08/06/2020 01:13 PM     Lab Results   Component Value Date/Time    Cholesterol, total 171 07/07/2020 07:30 AM    HDL Cholesterol 61 (H) 07/07/2020 07:30 AM    LDL, calculated 103.2 (H) 07/07/2020 07:30 AM    Triglyceride 34 07/07/2020 07:30 AM    CHOL/HDL Ratio 2.8 07/07/2020 07:30 AM     Lab Results   Component Value Date/Time    ALT (SGPT) 44 08/06/2020 01:13 PM         Impression/Plan:  1. Paroxysmal atrial fibrillation, currently maintaining sinus rhythm, anticoagulated with Eliquis  2. Essential hypertension, blood pressure controlled  3. Mild to moderate spinal stenosis, no surgical interventions planned at this time  4. Episode of SVT, terminated after 2 doses of adenosine    Ms. Toyin Weaver was seen today for a post-ER follow-up. She had an episode of SVT for which she went to the ER. She reports receiving 2 dose of Adenosine (one was 6mg and the other 12mg) and her heart rhythm went back to normal after the second dose. She had a brief episode of palpitations this morning that resolved on its own. She reports that a week prior to going to the ER, she experienced some shortness of breath and fatigue which resolved on its own. She may have gone into atrial fibrillation at that time. Atrial fibrillation and SVT discussed today. A 30 day event monitor will be ordered to evaluate the frequency of episodes and to determine which arrhythmias are occurring. She is anxious and concerned that she may have an episode while she is driving. In the past, she was prescribed metoprolol tartrate 25mg BID as needed but she states that she never took the medication and she does not have any at home. She will be started on metoprolol tartrate 25mg - take 1/2 tablet twice a day and she will return in 2 weeks for re-evaluation. She will follow-up with Dr. Marti Soto as scheduled and as needed. Samuel Bean MSN, FNP-BC    Please note:  Portions of this chart were created with Dragon medical speech to text program.  Unrecognized errors may be present.

## 2020-08-10 ENCOUNTER — OFFICE VISIT (OUTPATIENT)
Dept: CARDIOLOGY CLINIC | Age: 60
End: 2020-08-10

## 2020-08-10 VITALS
HEART RATE: 90 BPM | HEIGHT: 62 IN | SYSTOLIC BLOOD PRESSURE: 118 MMHG | DIASTOLIC BLOOD PRESSURE: 76 MMHG | OXYGEN SATURATION: 100 % | WEIGHT: 224 LBS | BODY MASS INDEX: 41.22 KG/M2

## 2020-08-10 DIAGNOSIS — I48.91 NEW ONSET ATRIAL FIBRILLATION (HCC): Primary | ICD-10-CM

## 2020-08-10 DIAGNOSIS — R00.2 PALPITATIONS: ICD-10-CM

## 2020-08-10 DIAGNOSIS — I48.0 PAF (PAROXYSMAL ATRIAL FIBRILLATION) (HCC): ICD-10-CM

## 2020-08-10 RX ORDER — METOPROLOL TARTRATE 25 MG/1
12.5 TABLET, FILM COATED ORAL 2 TIMES DAILY
Qty: 60 TAB | Refills: 5 | Status: SHIPPED | OUTPATIENT
Start: 2020-08-10 | End: 2021-02-03 | Stop reason: SDUPTHER

## 2020-08-10 RX ORDER — METOPROLOL TARTRATE 25 MG/1
25 TABLET, FILM COATED ORAL 2 TIMES DAILY
Qty: 180 TAB | Refills: 3 | Status: CANCELLED | OUTPATIENT
Start: 2020-08-10

## 2020-08-10 RX ORDER — AMLODIPINE BESYLATE 5 MG/1
5 TABLET ORAL DAILY
Qty: 90 TAB | Refills: 3 | Status: CANCELLED | OUTPATIENT
Start: 2020-08-10

## 2020-08-10 NOTE — LETTER
NOTIFICATION RETURN TO WORK / SCHOOL 
 
8/10/2020 3:56 PM 
 
Ms. [facility] 
Matt Hubbard [address] [place] [id number] To Whom It May Concern: [facility] is currently under the care of [address] [place] [address]. She will return to work on: August 15, 2020. If there are questions or concerns please have the patient contact our office.  
 
 
 
Sincerely, 
 
 
Mauricio Roque NP

## 2020-08-10 NOTE — PATIENT INSTRUCTIONS
30 day event monitor; Dx:  Palpitations, hx of PAF and SVT Begin metoprolol 25mg - take 1/2 tablet twice a day All other medications to remain the same Follow-up with Simran Thapa in 2 weeks Follow-up with Dr. Slick Barnes as scheduled and as needed

## 2020-08-10 NOTE — PROGRESS NOTES
Agnes Ireland presents today for   Chief Complaint   Patient presents with   [facility] Follow Up     Per Zuleyma    Dizziness     lightheaded    Palpitations     fluttering       Caro Richards preferred language for health care discussion is english/other. Is someone accompanying this pt? no    Is the patient using any DME equipment during [address]? no    Depression Screening:  3 most recent PHQ Screens 8/10/2020   Little interest or pleasure in doing things Not at all   Feeling down, depressed, irritable, or hopeless Not at all   Total Score PHQ 2 0       Learning Assessment:  Learning Assessment 10/17/2019   PRIMARY LEARNER Patient   PRIMARY LANGUAGE ENGLISH   LEARNER PREFERENCE PRIMARY DEMONSTRATION   ANSWERED BY Patient   RELATIONSHIP SELF       Abuse Screening:  Abuse Screening Questionnaire 8/10/2020   Do you ever feel afraid of your partner? N   Are you in a relationship with someone who physically or mentally threatens you? N   Is it safe for you to go home? Y       Fall Risk  Fall Risk Assessment, last 12 mths 8/10/2020   Able to walk? Yes   Fall in past 12 months? No       Pt currently taking Anticoagulant therapy? Eliquis 5 mg twice a day    Coordination of Care:  1. Have you been to the ER, urgent care clinic since your last visit? Hospitalized since your last visit? 08/06/20    2. Have you seen or consulted any other health care providers outside of the [address] [place] [address] since your last visit? Include any pap smears or colon screening.  no

## 2020-08-17 NOTE — PROGRESS NOTES
[facility] presents today for follow-up of her heart rate and blood pressure after being started on low dose metoprolol tartrate on 8/10/10, when I saw her for a post-ER follow-up. She presented to the ER on 8/6/20 with complaints of palpitations (heart racing), mild chest discomfort, and shortness of breath that began at work. Her EKG showed SVT with heart rate of 166 bpm.  She states that a week before the ER visit, she experienced an episode of shortness of breath and fatigue which resolved on its own. She reported that this was similar to an ER visit in Dec. 2019. She received 2 doses of adenosine and she converted back to sinus rhythm. I requested a 30 day event monitor and she states that she has been wearing it since 8/17/20. She offers complaints of headaches that have been occurring since she began wearing the monitor and she feels it is related to the adhesive. She reports that she did not have headaches prior to wearing the monitor. She is a 29-year-old -American female with history of hypertension, atrial fibrillation, and mild to moderate spinal stenosis. Her last echocardiogram was done in April 2018 it showed ejection fraction of 55-60% and no wall motion abnormalities. She had a pharmacologic nuclear stress test done in April 2018 and it was considered normal and low risk. There were no perfusion imaging abnormalities concerning for ischemia or infarct. Denies chest pain, tightness, heaviness, and admits to palpitations. Denies shortness of breath at rest, admits to mild dyspnea on exertion, denies orthopnea and PND. Denies abdominal bloating. Denies lightheadedness, dizziness, and syncope. Admits to mild lower extremity edema by the end of the day, admits to varicosities in her legs and denies claudication. Denies nausea, vomiting, diarrhea, melena, hematochezia. Denies hematuria, urgency, frequency. Denies fever, chills.         PMH:  Past Medical History: Diagnosis Date    Arthritis     bl knees     Atrial fibrillation (HCC)     Essential hypertension        PSH:  No past surgical history on file. MEDS:  Current Outpatient Medications   Medication Sig    apixaban (Eliquis) 5 mg tablet TAKE 1 TABLET BY MOUTH TWO TIMES A DAY    metoprolol tartrate (LOPRESSOR) 25 mg tablet Take 0.5 Tabs by mouth two (2) times a day. Or as directed    diclofenac EC (VOLTAREN) 75 mg EC tablet Take 75 mg by mouth two (2) times a day.  cholecalciferol, vitamin D3, (VITAMIN D3) 2,000 unit tab Take  by mouth daily.  meclizine (ANTIVERT) 25 mg tablet Take  by mouth three (3) times daily as needed.  amLODIPine (NORVASC) 5 mg tablet Take 5 mg by mouth daily. No current facility-administered medications for this visit. Allergies and Sensitivities:  No Known Allergies    Family History:  Family History   Problem Relation Age of Onset    Cancer Mother     Diabetes Mother     Cancer Father     Heart Attack Brother        Social History:  She  reports that she has never smoked. She has never used smokeless tobacco.  She  reports no history of alcohol use. Physical:  Visit Vitals  /72 (BP 1 Location: Left arm, BP Patient Position: Sitting)   Pulse 75   Ht 5' 2\" (1.575 m)   Wt 102.1 kg (225 lb)   SpO2 98%   BMI 41.15 kg/m²         Exam:  Neck:  Supple, no JVD, no carotid bruits  CV:  Normal S1 and  S2, no murmurs, rubs, or gallops noted  Lungs:  Clear to ausculation throughout, no wheezes or rales  Abd:  Soft, non-tender, non-distended with good bowel sounds.   No hepatosplenomegaly  Extremities:  No edema      Data:  EKG:    Normal sinus rhythm      LABS:  Lab Results   Component Value Date/Time    Sodium 140 08/06/2020 01:13 PM    Potassium 4.0 08/06/2020 01:13 PM    Chloride 110 08/06/2020 01:13 PM    CO2 21 08/06/2020 01:13 PM    Glucose 110 (H) 08/06/2020 01:13 PM    BUN 14 08/06/2020 01:13 PM    Creatinine 0.99 08/06/2020 01:13 PM     Lab Results Component Value Date/Time    Cholesterol, total 171 07/07/2020 07:30 AM    HDL Cholesterol 61 (H) 07/07/2020 07:30 AM    LDL, calculated 103.2 (H) 07/07/2020 07:30 AM    Triglyceride 34 07/07/2020 07:30 AM    CHOL/HDL Ratio 2.8 07/07/2020 07:30 AM     Lab Results   Component Value Date/Time    ALT (SGPT) 44 08/06/2020 01:13 PM         Impression/Plan:  1. Paroxysmal atrial fibrillation, currently maintaining sinus rhythm, anticoagulated with Eliquis  2. Essential hypertension, blood pressure controlled  3. Mild to moderate spinal stenosis, no surgical interventions planned at this time  4. Episode of SVT, terminated after 2 doses of adenosine    Ms. Deandre Barone was seen today for follow-upof her heart rate and blood pressure after being started on low dose metoprolol tartrate. A 30 day event monitor was ordered to evaluate the frequency of episodes and to determine which arrhythmias are occurring. She has been wearing the event monitor since 8/17/20 and she states that she has been having headaches since she began wearing it. She feels it is due to the adhesive. Event monitor summary reports downloaded have shown sinus bradycardia (mainly during the night) with heart rate in the 50's and episodes of sinus tachycardia with heart rates in the 120's during her awake hours. There have been no patient triggered events. No atrial fibrillation has been detected. She was asked to wear the monitor as long as possible but if the headache continues, she can discontinue the monitor and return it to [employer]. For now, will continue her low dose beta blocker. She may not be able to use a higher dose as she is bradycardic when during the early morning hours when she is sleeping. If palpitations are prolonged or she is symptomatic, she was advised to go to the ER for further evaluation and treatment. She will follow-up with Dr. Dana Vanegas as scheduled and as needed.       Maureen Pickens MSN, FNP-BC    Please note:  Portions of this chart were created with Dragon medical speech to text program.  Unrecognized errors may be present.

## 2020-08-24 ENCOUNTER — OFFICE VISIT (OUTPATIENT)
Dept: CARDIOLOGY CLINIC | Age: 60
End: 2020-08-24

## 2020-08-24 VITALS
BODY MASS INDEX: 41.41 KG/M2 | WEIGHT: 225 LBS | DIASTOLIC BLOOD PRESSURE: 72 MMHG | HEIGHT: 62 IN | SYSTOLIC BLOOD PRESSURE: 120 MMHG | OXYGEN SATURATION: 98 % | HEART RATE: 75 BPM

## 2020-08-24 DIAGNOSIS — I48.0 PAF (PAROXYSMAL ATRIAL FIBRILLATION) (HCC): Primary | ICD-10-CM

## 2020-08-24 DIAGNOSIS — I48.91 NEW ONSET ATRIAL FIBRILLATION (HCC): ICD-10-CM

## 2020-08-24 DIAGNOSIS — R00.2 PALPITATIONS: ICD-10-CM

## 2020-08-24 NOTE — PATIENT INSTRUCTIONS
If unable to tolerate wearing the event monitor, discontinue and return to company Continue present medication regimen Follow-up with Dr Domi Hester as scheduled and as needed If you notice rapid or irregular heart beats, go to ER for further evaluation if it occurs after office hours.   If during office hours, can call the office and we will have you come in for EKG

## 2020-08-24 NOTE — PROGRESS NOTES
Renee Deepak presents today for   Chief Complaint   Patient presents with    Follow-up     2 week follow up after starting metoprolol        Renee Sotelo preferred language for health care discussion is english/other. Is someone accompanying this pt? no    Is the patient using any DME equipment during [address]? no    Depression Screening:  3 most recent PHQ Screens 8/24/2020   Little interest or pleasure in doing things Not at all   Feeling down, depressed, irritable, or hopeless Not at all   Total Score PHQ 2 0       Learning Assessment:  Learning Assessment 10/17/2019   PRIMARY LEARNER Patient   PRIMARY LANGUAGE ENGLISH   LEARNER PREFERENCE PRIMARY DEMONSTRATION   ANSWERED BY Patient   RELATIONSHIP SELF       Abuse Screening:  Abuse Screening Questionnaire 8/24/2020   Do you ever feel afraid of your partner? N   Are you in a relationship with someone who physically or mentally threatens you? N   Is it safe for you to go home? Y       Fall Risk  Fall Risk Assessment, last 12 mths 8/24/2020   Able to walk? Yes   Fall in past 12 months? No       Pt currently taking Anticoagulant therapy? Eliquis     Coordination of Care:  1. Have you been to the ER, urgent care clinic since your last visit? Hospitalized since your last visit? no    2. Have you seen or consulted any other health care providers outside of the [address] [place] [address] since your last visit? Include any pap smears or colon screening.  no

## 2020-08-26 ENCOUNTER — HOSPITAL ENCOUNTER (EMERGENCY)
Age: 60
Discharge: HOME | End: 2020-08-26
Attending: EMERGENCY MEDICINE
Payer: COMMERCIAL

## 2020-08-26 ENCOUNTER — APPOINTMENT (OUTPATIENT)
Dept: GENERAL RADIOLOGY | Age: 60
End: 2020-08-26
Attending: EMERGENCY MEDICINE
Payer: COMMERCIAL

## 2020-08-26 ENCOUNTER — APPOINTMENT (OUTPATIENT)
Dept: MRI IMAGING | Age: 60
End: 2020-08-26
Attending: EMERGENCY MEDICINE
Payer: COMMERCIAL

## 2020-08-26 ENCOUNTER — APPOINTMENT (OUTPATIENT)
Dept: CT IMAGING | Age: 60
End: 2020-08-26
Attending: EMERGENCY MEDICINE
Payer: COMMERCIAL

## 2020-08-26 VITALS
BODY MASS INDEX: 41.41 KG/M2 | HEART RATE: 91 BPM | WEIGHT: 225 LBS | HEIGHT: 62 IN | SYSTOLIC BLOOD PRESSURE: 129 MMHG | OXYGEN SATURATION: 100 % | TEMPERATURE: 98 F | RESPIRATION RATE: 24 BRPM | DIASTOLIC BLOOD PRESSURE: 89 MMHG

## 2020-08-26 DIAGNOSIS — I48.0 PAF (PAROXYSMAL ATRIAL FIBRILLATION) (HCC): ICD-10-CM

## 2020-08-26 DIAGNOSIS — J18.9 COMMUNITY ACQUIRED PNEUMONIA OF RIGHT MIDDLE LOBE OF LUNG: Primary | ICD-10-CM

## 2020-08-26 DIAGNOSIS — R00.2 PALPITATIONS: ICD-10-CM

## 2020-08-26 DIAGNOSIS — R42 DIZZINESS: ICD-10-CM

## 2020-08-26 LAB
ALBUMIN SERPL-MCNC: 3.3 G/DL (ref 3.4–5)
ALBUMIN/GLOB SERPL: 0.8 {RATIO} (ref 0.8–1.7)
ALP SERPL-CCNC: 124 U/L (ref 45–117)
ALT SERPL-CCNC: 67 U/L (ref 13–56)
ANION GAP SERPL CALC-SCNC: 8 MMOL/L (ref 3–18)
APPEARANCE UR: CLEAR
AST SERPL-CCNC: 72 U/L (ref 10–38)
ATRIAL RATE: 76 BPM
BACTERIA URNS QL MICRO: ABNORMAL /HPF
BASOPHILS # BLD: 0 K/UL (ref 0–0.1)
BASOPHILS NFR BLD: 0 % (ref 0–2)
BILIRUB SERPL-MCNC: 0.4 MG/DL (ref 0.2–1)
BILIRUB UR QL: NEGATIVE
BUN SERPL-MCNC: 9 MG/DL (ref 7–18)
BUN/CREAT SERPL: 11 (ref 12–20)
CALCIUM SERPL-MCNC: 8.2 MG/DL (ref 8.5–10.1)
CALCULATED P AXIS, ECG09: 63 DEGREES
CALCULATED R AXIS, ECG10: 17 DEGREES
CALCULATED T AXIS, ECG11: 66 DEGREES
CHLORIDE SERPL-SCNC: 108 MMOL/L (ref 100–111)
CO2 SERPL-SCNC: 22 MMOL/L (ref 21–32)
COLOR UR: YELLOW
CREAT SERPL-MCNC: 0.82 MG/DL (ref 0.6–1.3)
DIAGNOSIS, 93000: NORMAL
DIFFERENTIAL METHOD BLD: ABNORMAL
EOSINOPHIL # BLD: 0 K/UL (ref 0–0.4)
EOSINOPHIL NFR BLD: 1 % (ref 0–5)
EPITH CASTS URNS QL MICRO: ABNORMAL /LPF (ref 0–5)
ERYTHROCYTE [DISTWIDTH] IN BLOOD BY AUTOMATED COUNT: 18 % (ref 11.6–14.5)
GLOBULIN SER CALC-MCNC: 4.2 G/DL (ref 2–4)
GLUCOSE SERPL-MCNC: 93 MG/DL (ref 74–99)
GLUCOSE UR STRIP.AUTO-MCNC: NEGATIVE MG/DL
HCT VFR BLD AUTO: 26.7 % (ref 35–45)
HGB BLD-MCNC: 7.9 G/DL (ref 12–16)
HGB UR QL STRIP: NEGATIVE
KETONES UR QL STRIP.AUTO: NEGATIVE MG/DL
LACTATE BLD-SCNC: 1.79 MMOL/L (ref 0.4–2)
LEUKOCYTE ESTERASE UR QL STRIP.AUTO: NEGATIVE
LYMPHOCYTES # BLD: 0.8 K/UL (ref 0.9–3.6)
LYMPHOCYTES NFR BLD: 32 % (ref 21–52)
MCH RBC QN AUTO: 20.2 PG (ref 24–34)
MCHC RBC AUTO-ENTMCNC: 29.6 G/DL (ref 31–37)
MCV RBC AUTO: 68.3 FL (ref 74–97)
MONOCYTES # BLD: 0.2 K/UL (ref 0.05–1.2)
MONOCYTES NFR BLD: 7 % (ref 3–10)
MUCOUS THREADS URNS QL MICRO: ABNORMAL /LPF
NEUTS SEG # BLD: 1.5 K/UL (ref 1.8–8)
NEUTS SEG NFR BLD: 60 % (ref 40–73)
NITRITE UR QL STRIP.AUTO: NEGATIVE
P-R INTERVAL, ECG05: 168 MS
PH UR STRIP: 6 [PH] (ref 5–8)
PLATELET # BLD AUTO: 255 K/UL (ref 135–420)
PMV BLD AUTO: 9.3 FL (ref 9.2–11.8)
POTASSIUM SERPL-SCNC: 4.6 MMOL/L (ref 3.5–5.5)
PROT SERPL-MCNC: 7.5 G/DL (ref 6.4–8.2)
PROT UR STRIP-MCNC: ABNORMAL MG/DL
Q-T INTERVAL, ECG07: 378 MS
QRS DURATION, ECG06: 76 MS
QTC CALCULATION (BEZET), ECG08: 425 MS
RBC # BLD AUTO: 3.91 M/UL (ref 4.2–5.3)
SODIUM SERPL-SCNC: 138 MMOL/L (ref 136–145)
SP GR UR REFRACTOMETRY: 1.02 (ref 1–1.03)
TROPONIN I SERPL-MCNC: <0.02 NG/ML (ref 0–0.04)
TROPONIN I SERPL-MCNC: <0.02 NG/ML (ref 0–0.04)
UROBILINOGEN UR QL STRIP.AUTO: 1 EU/DL (ref 0.2–1)
VENTRICULAR RATE, ECG03: 76 BPM
WBC # BLD AUTO: 2.6 K/UL (ref 4.6–13.2)
WBC URNS QL MICRO: ABNORMAL /HPF (ref 0–5)

## 2020-08-26 PROCEDURE — 70551 MRI BRAIN STEM W/O DYE: CPT

## 2020-08-26 PROCEDURE — 84484 ASSAY OF TROPONIN QUANT: CPT

## 2020-08-26 PROCEDURE — 83605 ASSAY OF LACTIC ACID: CPT

## 2020-08-26 PROCEDURE — 74011250636 HC RX REV CODE- 250/636: Performed by: EMERGENCY MEDICINE

## 2020-08-26 PROCEDURE — 96374 THER/PROPH/DIAG INJ IV PUSH: CPT

## 2020-08-26 PROCEDURE — 96375 TX/PRO/DX INJ NEW DRUG ADDON: CPT

## 2020-08-26 PROCEDURE — 93005 ELECTROCARDIOGRAM TRACING: CPT

## 2020-08-26 PROCEDURE — 87086 URINE CULTURE/COLONY COUNT: CPT

## 2020-08-26 PROCEDURE — 74011250637 HC RX REV CODE- 250/637: Performed by: EMERGENCY MEDICINE

## 2020-08-26 PROCEDURE — 74011000250 HC RX REV CODE- 250: Performed by: EMERGENCY MEDICINE

## 2020-08-26 PROCEDURE — 85025 COMPLETE CBC W/AUTO DIFF WBC: CPT

## 2020-08-26 PROCEDURE — 99284 EMERGENCY DEPT VISIT MOD MDM: CPT

## 2020-08-26 PROCEDURE — 71046 X-RAY EXAM CHEST 2 VIEWS: CPT

## 2020-08-26 PROCEDURE — 80053 COMPREHEN METABOLIC PANEL: CPT

## 2020-08-26 PROCEDURE — 87040 BLOOD CULTURE FOR BACTERIA: CPT

## 2020-08-26 PROCEDURE — 87635 SARS-COV-2 COVID-19 AMP PRB: CPT

## 2020-08-26 PROCEDURE — 81001 URINALYSIS AUTO W/SCOPE: CPT

## 2020-08-26 PROCEDURE — 87147 CULTURE TYPE IMMUNOLOGIC: CPT

## 2020-08-26 PROCEDURE — 70450 CT HEAD/BRAIN W/O DYE: CPT

## 2020-08-26 RX ORDER — MECLIZINE HCL 12.5 MG 12.5 MG/1
50 TABLET ORAL
Status: COMPLETED | OUTPATIENT
Start: 2020-08-26 | End: 2020-08-26

## 2020-08-26 RX ORDER — ACETAMINOPHEN 500 MG
1000 TABLET ORAL
Status: COMPLETED | OUTPATIENT
Start: 2020-08-26 | End: 2020-08-26

## 2020-08-26 RX ORDER — DIAZEPAM 5 MG/1
5 TABLET ORAL
Status: COMPLETED | OUTPATIENT
Start: 2020-08-26 | End: 2020-08-26

## 2020-08-26 RX ORDER — SODIUM CHLORIDE 0.9 % (FLUSH) 0.9 %
5-10 SYRINGE (ML) INJECTION AS NEEDED
Status: DISCONTINUED | OUTPATIENT
Start: 2020-08-26 | End: 2020-08-26 | Stop reason: HOSPADM

## 2020-08-26 RX ORDER — DOXYCYCLINE HYCLATE 100 MG
100 TABLET ORAL 2 TIMES DAILY
Qty: 14 TAB | Refills: 0 | Status: SHIPPED | OUTPATIENT
Start: 2020-08-26 | End: 2020-09-02

## 2020-08-26 RX ADMIN — MECLIZINE 50 MG: 12.5 TABLET ORAL at 15:18

## 2020-08-26 RX ADMIN — DIAZEPAM 5 MG: 5 TABLET ORAL at 15:18

## 2020-08-26 RX ADMIN — SODIUM CHLORIDE 1000 ML: 900 INJECTION, SOLUTION INTRAVENOUS at 11:00

## 2020-08-26 RX ADMIN — AZITHROMYCIN MONOHYDRATE 500 MG: 500 INJECTION, POWDER, LYOPHILIZED, FOR SOLUTION INTRAVENOUS at 15:22

## 2020-08-26 RX ADMIN — ACETAMINOPHEN 1000 MG: 500 TABLET, FILM COATED ORAL at 15:17

## 2020-08-26 RX ADMIN — CEFTRIAXONE SODIUM 2 G: 2 INJECTION, POWDER, FOR SOLUTION INTRAMUSCULAR; INTRAVENOUS at 15:35

## 2020-08-26 NOTE — ED NOTES
6:26 PM :Pt care assumed from Landen Vergara, ED provider. Pt complaint(s), current treatment plan, progression and available diagnostic results have been discussed thoroughly. Rounding occurred: no  Intended Disposition: TBD   Pending diagnostic reports and/or labs (please list): pending MRI, repeat cardiac enzymes, ambulation    Pt with history of AFib on AC with Eliquis who presents with dizziness since awakening that improves with lying flat. Febrile with PNA on CXR. Given Azithromycin and Rocephin here. Initial CT head abnormal - pending MRI. Will recheck cardiac enzymes. Recent Results (from the past 12 hour(s))   METABOLIC PANEL, COMPREHENSIVE    Collection Time: 08/26/20  9:42 AM   Result Value Ref Range    Sodium 138 136 - 145 mmol/L    Potassium 4.6 3.5 - 5.5 mmol/L    Chloride 108 100 - 111 mmol/L    CO2 22 21 - 32 mmol/L    Anion gap 8 3.0 - 18 mmol/L    Glucose 93 74 - 99 mg/dL    BUN 9 7.0 - 18 MG/DL    Creatinine 0.82 0.6 - 1.3 MG/DL    BUN/Creatinine ratio 11 (L) 12 - 20      GFR est AA >60 >60 ml/min/1.73m2    GFR est non-AA >60 >60 ml/min/1.73m2    Calcium 8.2 (L) 8.5 - 10.1 MG/DL    Bilirubin, total 0.4 0.2 - 1.0 MG/DL    ALT (SGPT) 67 (H) 13 - 56 U/L    AST (SGOT) 72 (H) 10 - 38 U/L    Alk.  phosphatase 124 (H) 45 - 117 U/L    Protein, total 7.5 6.4 - 8.2 g/dL    Albumin 3.3 (L) 3.4 - 5.0 g/dL    Globulin 4.2 (H) 2.0 - 4.0 g/dL    A-G Ratio 0.8 0.8 - 1.7     TROPONIN I    Collection Time: 08/26/20  9:42 AM   Result Value Ref Range    Troponin-I, QT <0.02 0.0 - 0.045 NG/ML   URINALYSIS W/ RFLX MICROSCOPIC    Collection Time: 08/26/20 10:03 AM   Result Value Ref Range    Color YELLOW      Appearance CLEAR      Specific gravity 1.017 1.005 - 1.030      pH (UA) 6.0 5.0 - 8.0      Protein TRACE (A) NEG mg/dL    Glucose Negative NEG mg/dL    Ketone Negative NEG mg/dL    Bilirubin Negative NEG      Blood Negative NEG      Urobilinogen 1.0 0.2 - 1.0 EU/dL    Nitrites Negative NEG      Leukocyte Esterase Negative NEG     URINE MICROSCOPIC ONLY    Collection Time: 08/26/20 10:03 AM   Result Value Ref Range    WBC 4 to 10 0 - 5 /hpf    Epithelial cells 3+ 0 - 5 /lpf    Bacteria FEW (A) NEG /hpf    Mucus 1+ (A) NEG /lpf   EKG, 12 LEAD, INITIAL    Collection Time: 08/26/20 10:32 AM   Result Value Ref Range    Ventricular Rate 76 BPM    Atrial Rate 76 BPM    P-R Interval 168 ms    QRS Duration 76 ms    Q-T Interval 378 ms    QTC Calculation (Bezet) 425 ms    Calculated P Axis 63 degrees    Calculated R Axis 17 degrees    Calculated T Axis 66 degrees    Diagnosis       Normal sinus rhythm  Normal ECG  When compared with ECG of 06-AUG-2020 14:04,  No significant change was found  Confirmed by Marti Soto MD, Promise Karis ([id number]) on 8/26/2020 3:23:50 PM     CBC WITH AUTOMATED DIFF    Collection Time: 08/26/20 11:17 AM   Result Value Ref Range    WBC 2.6 (L) 4.6 - 13.2 K/uL    RBC 3.91 (L) 4.20 - 5.30 M/uL    HGB 7.9 (L) 12.0 - 16.0 g/dL    HCT 26.7 (L) 35.0 - 45.0 %    MCV 68.3 (L) 74.0 - 97.0 FL    MCH 20.2 (L) 24.0 - 34.0 PG    MCHC 29.6 (L) 31.0 - 37.0 g/dL    RDW 18.0 (H) 11.6 - 14.5 %    PLATELET 663 940 - 474 K/uL    MPV 9.3 9.2 - 11.8 FL    NEUTROPHILS 60 40 - 73 %    LYMPHOCYTES 32 21 - 52 %    MONOCYTES 7 3 - 10 %    EOSINOPHILS 1 0 - 5 %    BASOPHILS 0 0 - 2 %    ABS. NEUTROPHILS 1.5 (L) 1.8 - 8.0 K/UL    ABS. LYMPHOCYTES 0.8 (L) 0.9 - 3.6 K/UL    ABS. MONOCYTES 0.2 0.05 - 1.2 K/UL    ABS. EOSINOPHILS 0.0 0.0 - 0.4 K/UL    ABS. BASOPHILS 0.0 0.0 - 0.1 K/UL    DF AUTOMATED     POC LACTIC ACID    Collection Time: 08/26/20  3:28 PM   Result Value Ref Range    Lactic Acid (POC) 1.79 0.40 - 2.00 mmol/L   TROPONIN I    Collection Time: 08/26/20  6:28 PM   Result Value Ref Range    Troponin-I, QT <0.02 0.0 - 0.045 NG/ML     8:01 PM: Preliminary MRI brain: \"Prelim onlyno acute infarct, no edema, no definite mass, small vessel disease final read in the morning\"    Patient has been ambulated.   She experienced some continuation of her dizziness, but was able to ambulate  Repeat  troponin is within normal limits. Patient will be discharged home with doxycycline for pneumonia. Patient advised to stay hydrated and return with worsening symptoms, shortness of breath, inability to tolerate medications. At this time, patient is stable and appropriate for discharge home. Patient demonstrates understanding of current diagnoses and is in agreement with the treatment plan. They are advised that while the likelihood of serious underlying condition is low at this point given the evaluation performed today, we cannot fully rule it out. They are advised to immediately return with any new symptoms or worsening of current condition. All questions have been answered. Patient is given educational material regarding their diagnoses, including danger symptoms and when to return to the ED.   Florin Hnederson PA-C

## 2020-08-26 NOTE — ED NOTES
Spoke to Ms. Yanes [facility] regarding needing medication for MRI. She states that she does not need anything and will be ok to get her MRI.

## 2020-08-26 NOTE — ED TRIAGE NOTES
Dizziness and SOB  with standing. Denies vomiting, denies diarrhea. Hx of heart palpitations and prescribed eliquis. Works at [employer].

## 2020-08-26 NOTE — ED NOTES
Ambulated patient in hallway about 200 feet, patient reports feeling dizzy towards the end of walk. Returned back to chair. Will inform SAI Oliva aware.

## 2020-08-26 NOTE — LETTER
NOTIFICATION OF RETURN TO WORK 
 
8/26/2020 8:13 PM 
 
[facility] 
[facility] [id number] Adriane Monzon To Whom It May Concern: [facility] was under the care of [facility] EM[facility]GENC[facility]. She will be able to return no sooner than Tuesday,  9/1/20, but she will need to have been symptom free for at least 3 days without the use of medication. If there are questions or concerns please have the patient contact our office. Sincerely, Zurdo Rosales PA-C

## 2020-08-26 NOTE — REMOTE MONITORING
Spoke with RN regarding Sepsis Bundle. Call back number [phone], thank you!   Signed By: Gonzalez Owusu RN     August 26, 2020      [address] [facility]

## 2020-08-26 NOTE — ED PROVIDER NOTES
EMERGENCY DEPARTMENT HISTORY AND PHYSICAL EXAM    Date: 8/26/2020  Patient Name: Nancy Alfredo    History of Presenting Illness     Chief Complaint   Patient presents with    Dizziness         History Provided By: Patient        Additional History (Context): Nancy Alfredo is a 64 y.o. female with hypertension, osteoarthritis and atrial fibrillation who presents with dizziness upon standing since yesterday. Denies unilateral weakness changes in speech or vision recent head trauma. History of A. fib and is on Eliquis. Says improved for dizziness when supine. Denies any change in her dizziness with head turning. Denies tinnitus. Denies nausea vomiting. Patient has low-grade fever. Denies cough dysuria flank pain. PCP: Low Pathak MD    Current Facility-Administered Medications   Medication Dose Route Frequency Provider Last Rate Last Dose    sodium chloride (NS) flush 5-10 mL  5-10 mL IntraVENous PRN Carla Jimenez PA        cefTRIAXone (ROCEPHIN) 2 g in sterile water (preservative free) 20 mL IV syringe  2 g IntraVENous Q24H Carla Jimenez PA   2 g at 08/26/20 1535    azithromycin (ZITHROMAX) 500 mg in  mL  500 mg IntraVENous Q24H Carla Jimenez PA   500 mg at 08/26/20 1522     Current Outpatient Medications   Medication Sig Dispense Refill    apixaban (Eliquis) 5 mg tablet TAKE 1 TABLET BY MOUTH TWO TIMES A  Tab 3    metoprolol tartrate (LOPRESSOR) 25 mg tablet Take 0.5 Tabs by mouth two (2) times a day. Or as directed 60 Tab 5    diclofenac EC (VOLTAREN) 75 mg EC tablet Take 75 mg by mouth two (2) times a day.  cholecalciferol, vitamin D3, (VITAMIN D3) 2,000 unit tab Take  by mouth daily.  meclizine (ANTIVERT) 25 mg tablet Take  by mouth three (3) times daily as needed.  amLODIPine (NORVASC) 5 mg tablet Take 5 mg by mouth daily.          Past History     Past Medical History:  Past Medical History:   Diagnosis Date    Arthritis     bl knees     Atrial fibrillation ([facility] [address]     Essential hypertension        Past Surgical History:  No past surgical history on file. Family History:  Family History   Problem Relation Age of Onset    Cancer Mother     Diabetes Mother     Cancer Father     Heart Attack Brother        Social History:  Social History     Tobacco Use    Smoking status: Never Smoker    Smokeless tobacco: Never Used   Substance Use Topics    Alcohol use: No    Drug use: No       Allergies:  No Known Allergies      Review of Systems   Review of Systems   Constitutional: Positive for fever. HENT: Negative. Eyes: Negative for visual disturbance. Respiratory: Negative for cough. Cardiovascular: Negative for chest pain and palpitations. Gastrointestinal: Negative for abdominal pain, diarrhea, nausea and vomiting. Endocrine: Negative. Genitourinary: Negative for dysuria and flank pain. Musculoskeletal: Negative for back pain, neck pain and neck stiffness. Skin: Negative for rash. Allergic/Immunologic: Negative. Neurological: Positive for dizziness. Negative for seizures, syncope, speech difficulty, weakness and numbness. Hematological: Bruises/bleeds easily. Psychiatric/Behavioral: Negative. All Other Systems Negative  Physical Exam     Vitals:    08/26/20 0732   BP: 108/74   Pulse: 85   Resp: 24   Temp: (!) 100.8 °F (38.2 °C)   SpO2: 100%   Weight: 102.1 kg (225 lb)   Height: 5' 2\" (1.575 m)     Physical Exam  Vitals signs and nursing note reviewed. Constitutional:       Appearance: She is well-developed. HENT:      Head: Normocephalic and atraumatic. Right Ear: External ear normal.      Left Ear: External ear normal.      Nose: Nose normal.   Eyes:      Extraocular Movements: Extraocular movements intact. Conjunctiva/sclera: Conjunctivae normal.      Pupils: Pupils are equal, round, and reactive to light. Comments: No nystagmus.    Neck:      Musculoskeletal: Normal range of motion and neck supple. Vascular: No JVD. Trachea: No tracheal deviation. Cardiovascular:      Rate and Rhythm: Normal rate and regular rhythm. Heart sounds: Normal heart sounds. No murmur. No friction rub. No gallop. Pulmonary:      Effort: Pulmonary effort is normal. No respiratory distress. Breath sounds: Normal breath sounds. No wheezing or rales. Abdominal:      General: Bowel sounds are normal. There is no distension. Palpations: Abdomen is soft. There is no mass. Tenderness: There is no abdominal tenderness. There is no guarding or rebound. Musculoskeletal: Normal range of motion. General: No tenderness. Skin:     General: Skin is warm and dry. Findings: No rash. Neurological:      General: No focal deficit present. Mental Status: She is alert and oriented to person, place, and time. Cranial Nerves: No cranial nerve deficit. Motor: No weakness. Coordination: Coordination normal.      Deep Tendon Reflexes: Reflexes are normal and symmetric. Comments: Negative Romberg. No dysdiadochokinesis, past-pointing, or tremor. Normal heel shin. Psychiatric:         Behavior: Behavior normal.          Diagnostic Study Results     Labs -     Recent Results (from the past 12 hour(s))   METABOLIC PANEL, COMPREHENSIVE    Collection Time: 08/26/20  9:42 AM   Result Value Ref Range    Sodium 138 136 - 145 mmol/L    Potassium 4.6 3.5 - 5.5 mmol/L    Chloride 108 100 - 111 mmol/L    CO2 22 21 - 32 mmol/L    Anion gap 8 3.0 - 18 mmol/L    Glucose 93 74 - 99 mg/dL    BUN 9 7.0 - 18 MG/DL    Creatinine 0.82 0.6 - 1.3 MG/DL    BUN/Creatinine ratio 11 (L) 12 - 20      GFR est AA >60 >60 ml/min/1.73m2    GFR est non-AA >60 >60 ml/min/1.73m2    Calcium 8.2 (L) 8.5 - 10.1 MG/DL    Bilirubin, total 0.4 0.2 - 1.0 MG/DL    ALT (SGPT) 67 (H) 13 - 56 U/L    AST (SGOT) 72 (H) 10 - 38 U/L    Alk.  phosphatase 124 (H) 45 - 117 U/L    Protein, total 7.5 6.4 - 8.2 g/dL    Albumin 3.3 (L) 3.4 - 5.0 g/dL    Globulin 4.2 (H) 2.0 - 4.0 g/dL    A-G Ratio 0.8 0.8 - 1.7     TROPONIN I    Collection Time: 08/26/20  9:42 AM   Result Value Ref Range    Troponin-I, QT <0.02 0.0 - 0.045 NG/ML   URINALYSIS W/ RFLX MICROSCOPIC    Collection Time: 08/26/20 10:03 AM   Result Value Ref Range    Color YELLOW      Appearance CLEAR      Specific gravity 1.017 1.005 - 1.030      pH (UA) 6.0 5.0 - 8.0      Protein TRACE (A) NEG mg/dL    Glucose Negative NEG mg/dL    Ketone Negative NEG mg/dL    Bilirubin Negative NEG      Blood Negative NEG      Urobilinogen 1.0 0.2 - 1.0 EU/dL    Nitrites Negative NEG      Leukocyte Esterase Negative NEG     URINE MICROSCOPIC ONLY    Collection Time: 08/26/20 10:03 AM   Result Value Ref Range    WBC 4 to 10 0 - 5 /hpf    Epithelial cells 3+ 0 - 5 /lpf    Bacteria FEW (A) NEG /hpf    Mucus 1+ (A) NEG /lpf   EKG, 12 LEAD, INITIAL    Collection Time: 08/26/20 10:32 AM   Result Value Ref Range    Ventricular Rate 76 BPM    Atrial Rate 76 BPM    P-R Interval 168 ms    QRS Duration 76 ms    Q-T Interval 378 ms    QTC Calculation (Bezet) 425 ms    Calculated P Axis 63 degrees    Calculated R Axis 17 degrees    Calculated T Axis 66 degrees    Diagnosis       Normal sinus rhythm  Normal ECG  When compared with ECG of 06-AUG-2020 14:04,  No significant change was found  Confirmed by Xiomara Magallanes MD, Mariaa Strange ([id number]) on 8/26/2020 3:23:50 PM     CBC WITH AUTOMATED DIFF    Collection Time: 08/26/20 11:17 AM   Result Value Ref Range    WBC 2.6 (L) 4.6 - 13.2 K/uL    RBC 3.91 (L) 4.20 - 5.30 M/uL    HGB 7.9 (L) 12.0 - 16.0 g/dL    HCT 26.7 (L) 35.0 - 45.0 %    MCV 68.3 (L) 74.0 - 97.0 FL    MCH 20.2 (L) 24.0 - 34.0 PG    MCHC 29.6 (L) 31.0 - 37.0 g/dL    RDW 18.0 (H) 11.6 - 14.5 %    PLATELET 313 953 - 720 K/uL    MPV 9.3 9.2 - 11.8 FL    NEUTROPHILS 60 40 - 73 %    LYMPHOCYTES 32 21 - 52 %    MONOCYTES 7 3 - 10 %    EOSINOPHILS 1 0 - 5 %    BASOPHILS 0 0 - 2 %    ABS.  NEUTROPHILS 1.5 (L) 1.8 - 8.0 K/UL    ABS. LYMPHOCYTES 0.8 (L) 0.9 - 3.6 K/UL    ABS. MONOCYTES 0.2 0.05 - 1.2 K/UL    ABS. EOSINOPHILS 0.0 0.0 - 0.4 K/UL    ABS. BASOPHILS 0.0 0.0 - 0.1 K/UL    DF AUTOMATED     POC LACTIC ACID    Collection Time: 08/26/20  3:28 PM   Result Value Ref Range    Lactic Acid (POC) 1.79 0.40 - 2.00 mmol/L       Radiologic Studies -   XR CHEST PA LAT   Final Result   IMPRESSION:   Patchy opacity in the medial right middle lobe, consistent with pneumonia in the   appropriate clinical setting. Recommend radiographic follow-up within 6-8 weeks   after appropriate medical therapy and imaging follow-up until clearance to   exclude underlying obstructing mass. Findings are not specific for Covid 19   pneumonia; although Covid 19 pneumonia is not excluded. CT HEAD WO CONT   Final Result   Impression:   1. No definite acute intracranial abnormality. CT is known to be relatively   insensitive to acute ischemia in the first 24-48 hours and MRI may be useful if   clinically indicated. 2. Small area of hypoattenuation in the posterior right occipital white matter,   indeterminate. On prior MRI 4/7/2018, there are a few scattered white matter   lesions, which were atypical for chronic ischemic small vessel disease. Brain   MRI could be further evaluation. 3. Tiny hypodensity in the right basal ganglia is nonspecific, was also likely   present on prior MRI, but comparison is limited due to differences in technique. MRI BRAIN WO CONT    (Results Pending)     CT Results  (Last 48 hours)               08/26/20 0917  CT HEAD WO CONT Final result    Impression:  Impression:   1. No definite acute intracranial abnormality. CT is known to be relatively   insensitive to acute ischemia in the first 24-48 hours and MRI may be useful if   clinically indicated. 2. Small area of hypoattenuation in the posterior right occipital white matter,   indeterminate.  On prior MRI 4/7/2018, there are a few scattered white matter lesions, which were atypical for chronic ischemic small vessel disease. Brain   MRI could be further evaluation. 3. Tiny hypodensity in the right basal ganglia is nonspecific, was also likely   present on prior MRI, but comparison is limited due to differences in technique. Narrative:  CT of the head without contrast       HISTORY: Dizziness and shortness of breath with standing. COMPARISON: MR brain 4/7/2018       TECHNIQUE: Axial images of the brain were obtained, without the administration   of intravenous contrast. Coronal and sagittal reconstructions were generated. All CT scans at this facility are performed using dose optimization technique as   appropriate to a performed exam, to include automated exposure control,   adjustment of the MA and/or kV according to patient size (including appropriate   matching for site-specific examinations) or use of  iterative reconstruction   technique. FINDINGS:        Visualized mastoid air cells and paranasal sinuses are clear. No acute skull   fracture. No acute intracranial hemorrhage. No mass effect or midline shift. Small hypodensity in the right basal ganglia, was likely present on prior MRI,   but comparison is limited due to differences in technique. Small area of   hypodensity in the right posterior occipital white matter. No definite evidence   for large cortical infarct. CXR Results  (Last 48 hours)               08/26/20 1124  XR CHEST PA LAT Final result    Impression:  IMPRESSION:   Patchy opacity in the medial right middle lobe, consistent with pneumonia in the   appropriate clinical setting. Recommend radiographic follow-up within 6-8 weeks   after appropriate medical therapy and imaging follow-up until clearance to   exclude underlying obstructing mass. Findings are not specific for Covid 19   pneumonia; although Covid 19 pneumonia is not excluded.        Narrative:  EXAM: XR CHEST PA LAT       INDICATION: 60 years Female. fever. ADDITIONAL HISTORY: None. TECHNIQUE: Frontal and lateral views of the chest.       COMPARISON: 8/6/2020       FINDINGS:       The cardiac silhouette is within normal limits in size. There is a patchy opacity in the medial right middle lobe. There is no evidence of pleural effusion. There is no evidence of pneumothorax. There is no evidence of acute osseous abnormality. Medical Decision Making   I am the first provider for this patient. I reviewed the vital signs, available nursing notes, past medical history, past surgical history, family history and social history. Vital Signs-Reviewed the patient's vital signs. Procedures:  EKG    Date/Time: 8/26/2020 10:32 AM  Performed by: SAI Miles  Authorized by: SAI Miles     ECG reviewed by ED Physician in the absence of a cardiologist: yes    Interpretation:     Interpretation: normal    Rate:     ECG rate:  76    ECG rate assessment: normal    Rhythm:     Rhythm: sinus rhythm    Ectopy:     Ectopy: none    QRS:     QRS axis:  Normal    QRS intervals:  Normal  Conduction:     Conduction: normal    ST segments:     ST segments:  Normal  T waves:     T waves: normal          Provider Notes (Medical Decision Making): pt has RML infiltrate on CXR. Given IV ABX and swab for COVID ordered. Pt has abnormal CT scan brain; MRI ordered. Will give ambulation trial, repeat CE.      5:59 PM : Pt care transferred to [facility] provider. History of patient complaint(s), available diagnostic reports and current treatment plan has been discussed thoroughly. Bedside rounding on patient occured : no . Intended disposition of patient :TBD  Pending diagnostics reports and/or labs (please list): repeat CE, MRI results, ambulation trial    SAI kuhn's assistance in completion of this plan is greatly appreciated but it should be noted that I will be the provider of record for this patient.       MED RECONCILIATION:  Current Facility-Administered Medications   Medication Dose Route Frequency    sodium chloride (NS) flush 5-10 mL  5-10 mL IntraVENous PRN    cefTRIAXone (ROCEPHIN) 2 g in sterile water (preservative free) 20 mL IV syringe  2 g IntraVENous Q24H    azithromycin (ZITHROMAX) 500 mg in  mL  500 mg IntraVENous Q24H     Current Outpatient Medications   Medication Sig    apixaban (Eliquis) 5 mg tablet TAKE 1 TABLET BY MOUTH TWO TIMES A DAY    metoprolol tartrate (LOPRESSOR) 25 mg tablet Take 0.5 Tabs by mouth two (2) times a day. Or as directed    diclofenac EC (VOLTAREN) 75 mg EC tablet Take 75 mg by mouth two (2) times a day.  cholecalciferol, vitamin D3, (VITAMIN D3) 2,000 unit tab Take  by mouth daily.  meclizine (ANTIVERT) 25 mg tablet Take  by mouth three (3) times daily as needed.  amLODIPine (NORVASC) 5 mg tablet Take 5 mg by mouth daily. Disposition:  TBD    Follow-up Information    None         Current Discharge Medication List            Core Measures:    Critical Care Time:   Critical Care Time:   I have spent 35 minutes of critical care time involved in lab review, consultations with specialist, family decision-making, and documentation. During this entire length of time I was immediately available to the patient.     Critical Care: The reason for providing this level of medical care for this critically ill patient was due a critical illness that impaired one or more vital organ systems such that there was a high probability of imminent or life threatening deterioration in the patients condition. This care involved high complexity decision making to assess, manipulate, and support vital system functions, to treat this degreee vital organ system failure and to prevent further life threatening deterioration of the patients condition. Diagnosis     Clinical Impression:   1. Community acquired pneumonia of right middle lobe of lung    2.  Dizziness

## 2020-08-27 NOTE — ED NOTES
Patient discharged and given discharge instructions by Phu Guidoma. Patient ambulatory from ED. Patient accompanied by self. Pt d/c'd before assessed by RN.

## 2020-08-27 NOTE — DISCHARGE INSTRUCTIONS
Patient Education     Please return immediately to the Emergency Room for re-evaluation if you are not improving, develop any new symptoms, or develop worsening of current symptoms! If you have been prescribed a medication and are unable to take this medication for any reason, please return to the Emergency Department for further evaluation! If you have been referred for follow-up to a specialist, but are unable to follow-up and your symptoms are either not improving or are worsening, please return to the Emergency Department for further evaluation! Dizziness: Care Instructions  Your Care Instructions  Dizziness is the feeling of unsteadiness or fuzziness in your head. It is different than having vertigo, which is a feeling that the room is spinning or that you are moving or falling. It is also different from lightheadedness, which is the feeling that you are about to faint. It can be hard to know what causes dizziness. Some people feel dizzy when they have migraine headaches. Sometimes bouts of flu can make you feel dizzy. Some medical conditions, such as heart problems or high blood pressure, can make you feel dizzy. Many medicines can cause dizziness, including medicines for high blood pressure, pain, or anxiety. If a medicine causes your symptoms, your doctor may recommend that you stop or change the medicine. If it is a problem with your heart, you may need medicine to help your heart work better. If there is no clear reason for your symptoms, your doctor may suggest watching and waiting for a while to see if the dizziness goes away on its own. Follow-up care is a key part of your treatment and safety. Be sure to make and go to all appointments, and call your doctor if you are having problems. It's also a good idea to know your test results and keep a list of the medicines you take. How can you care for yourself at home?   · If your doctor recommends or prescribes medicine, take it exactly as directed. Call your doctor if you think you are having a problem with your medicine. · Do not drive while you feel dizzy. · Try to prevent falls. Steps you can take include:  ? Using nonskid mats, adding grab bars near the tub, and using night-lights. ? Clearing your home so that walkways are free of anything you might trip on.  ? Letting family and friends know that you have been feeling dizzy. This will help them know how to help you. When should you call for help? [id number] anytime you think you may need emergency care. For example, call if:  · You passed out (lost consciousness). · You have dizziness along with symptoms of a heart attack. These may include:  ? Chest pain or pressure, or a strange feeling in the chest.  ? Sweating. ? Shortness of breath. ? Nausea or vomiting. ? Pain, pressure, or a strange feeling in the back, neck, jaw, or upper belly or in one or both shoulders or arms. ? Lightheadedness or sudden weakness. ? A fast or irregular heartbeat. · You have symptoms of a stroke. These may include:  ? Sudden numbness, tingling, weakness, or loss of movement in your face, arm, or leg, especially on only one side of your body. ? Sudden vision changes. ? Sudden trouble speaking. ? Sudden confusion or trouble understanding simple statements. ? Sudden problems with walking or balance. ? A sudden, severe headache that is different from past headaches. Call your doctor now or seek immediate medical care if:  · You feel dizzy and have a fever, headache, or ringing in your ears. · You have new or increased nausea and vomiting. · Your dizziness does not go away or comes back. Watch closely for changes in your health, and be sure to contact your doctor if:  · You do not get better as expected. Where can you learn more? Go to http://leland-michael.info/  Enter Q823 in the search box to learn more about \"Dizziness: Care Instructions. \"  Current as of: June 26, 0145               GHZZPDM Version: 12.5  © 3357-5538 [employer]. Care instructions adapted under license by [employer] (which disclaims liability or warranty for this information). If you have questions about a medical condition or this instruction, always ask your healthcare professional. [address] any warranty or liability for your use of this information. Patient Education        Pneumonia: Care Instructions  Your Care Instructions     Pneumonia is an infection of the lungs. Most cases are caused by infections from bacteria or viruses. Pneumonia may be mild or very severe. If it is caused by bacteria, you will be treated with antibiotics. It may take a few weeks to a few months to recover fully from pneumonia, depending on how sick you were and whether your overall health is good. Follow-up care is a key part of your treatment and safety. Be sure to make and go to all appointments, and call your doctor if you are having problems. It's also a good idea to know your test results and keep a list of the medicines you take. How can you care for yourself at home? · Take your antibiotics exactly as directed. Do not stop taking the medicine just because you are feeling better. You need to take the full course of antibiotics. · Take your medicines exactly as prescribed. Call your doctor if you think you are having a problem with your medicine. · Get plenty of rest and sleep. You may feel weak and tired for a while, but your energy level will improve with time. · To prevent dehydration, drink plenty of fluids, enough so that your urine is light yellow or clear like water. Choose water and other caffeine-free clear liquids until you feel better. If you have kidney, heart, or liver disease and have to limit fluids, talk with your doctor before you increase the amount of fluids you drink.   · Take care of your cough so you can rest. A cough that brings up mucus from your lungs is common with pneumonia. It is one way your body gets rid of the infection. But if coughing keeps you from resting or causes severe fatigue and chest-wall pain, talk to your doctor. He or she may suggest that you take a medicine to reduce the cough. · Use a vaporizer or humidifier to add moisture to your bedroom. Follow the directions for cleaning the machine. · Do not smoke or allow others to smoke around you. Smoke will make your cough last longer. If you need help quitting, talk to your doctor about stop-smoking programs and medicines. These can increase your chances of quitting for good. · Take an over-the-counter pain medicine, such as acetaminophen (Tylenol), ibuprofen (Advil, Motrin), or naproxen (Aleve). Read and follow all instructions on the label. · Do not take two or more pain medicines at the same time unless the doctor told you to. Many pain medicines have acetaminophen, which is Tylenol. Too much acetaminophen (Tylenol) can be harmful. · If you were given a spirometer to measure how well your lungs are working, use it as instructed. This can help your doctor tell how your recovery is going. · To prevent pneumonia in the future, talk to your doctor about getting a flu vaccine (once a year) and a pneumococcal vaccine (one time only for most people). When should you call for help? [id number] anytime you think you may need emergency care. For example, call if:  · You have severe trouble breathing. Call your doctor now or seek immediate medical care if:  · You cough up dark brown or bloody mucus (sputum). · You have new or worse trouble breathing. · You are dizzy or lightheaded, or you feel like you may faint. Watch closely for changes in your health, and be sure to contact your doctor if:  · You have a new or higher fever. · You are coughing more deeply or more often. · You are not getting better after 2 days (48 hours). · You do not get better as expected.   Where can you learn more? Go to http://leland-michael.info/  Enter D336 in the search box to learn more about \"Pneumonia: Care Instructions. \"  Current as of: February 24, 2020               Content Version: 12.5  © 8706-1239 [employer]. Care instructions adapted under license by [employer] (which disclaims liability or warranty for this information). If you have questions about a medical condition or this instruction, always ask your healthcare professional. [address] any warranty or liability for your use of this information.

## 2020-08-28 LAB
BACTERIA SPEC CULT: ABNORMAL
BACTERIA SPEC CULT: ABNORMAL
CC UR VC: ABNORMAL
SARS-COV-2, COV2NT: NOT DETECTED
SERVICE CMNT-IMP: ABNORMAL

## 2020-09-01 LAB
BACTERIA SPEC CULT: NORMAL
BACTERIA SPEC CULT: NORMAL
SERVICE CMNT-IMP: NORMAL
SERVICE CMNT-IMP: NORMAL

## 2020-09-10 ENCOUNTER — APPOINTMENT (OUTPATIENT)
Dept: GENERAL RADIOLOGY | Age: 60
End: 2020-09-10
Attending: EMERGENCY MEDICINE
Payer: COMMERCIAL

## 2020-09-10 ENCOUNTER — HOSPITAL ENCOUNTER (EMERGENCY)
Age: 60
Discharge: HOME | End: 2020-09-10
Attending: EMERGENCY MEDICINE | Admitting: EMERGENCY MEDICINE
Payer: COMMERCIAL

## 2020-09-10 VITALS
SYSTOLIC BLOOD PRESSURE: 134 MMHG | HEART RATE: 75 BPM | DIASTOLIC BLOOD PRESSURE: 71 MMHG | WEIGHT: 225 LBS | BODY MASS INDEX: 41.41 KG/M2 | HEIGHT: 62 IN | OXYGEN SATURATION: 100 % | RESPIRATION RATE: 27 BRPM | TEMPERATURE: 98.9 F

## 2020-09-10 DIAGNOSIS — R00.2 PALPITATIONS: Primary | ICD-10-CM

## 2020-09-10 DIAGNOSIS — D64.9 ANEMIA, UNSPECIFIED TYPE: ICD-10-CM

## 2020-09-10 LAB
ALBUMIN SERPL-MCNC: 2.6 G/DL (ref 3.4–5)
ALBUMIN/GLOB SERPL: 0.6 {RATIO} (ref 0.8–1.7)
ALP SERPL-CCNC: 83 U/L (ref 45–117)
ALT SERPL-CCNC: 23 U/L (ref 13–56)
ANION GAP SERPL CALC-SCNC: 8 MMOL/L (ref 3–18)
AST SERPL-CCNC: 47 U/L (ref 10–38)
ATRIAL RATE: 93 BPM
BASOPHILS # BLD: 0 K/UL (ref 0–0.1)
BASOPHILS NFR BLD: 1 % (ref 0–2)
BILIRUB SERPL-MCNC: 0.7 MG/DL (ref 0.2–1)
BNP SERPL-MCNC: 414 PG/ML (ref 0–900)
BUN SERPL-MCNC: 12 MG/DL (ref 7–18)
BUN/CREAT SERPL: 13 (ref 12–20)
CALCIUM SERPL-MCNC: 8.4 MG/DL (ref 8.5–10.1)
CALCULATED P AXIS, ECG09: 64 DEGREES
CALCULATED R AXIS, ECG10: 25 DEGREES
CALCULATED T AXIS, ECG11: 22 DEGREES
CHLORIDE SERPL-SCNC: 109 MMOL/L (ref 100–111)
CO2 SERPL-SCNC: 21 MMOL/L (ref 21–32)
CREAT SERPL-MCNC: 0.94 MG/DL (ref 0.6–1.3)
DIAGNOSIS, 93000: NORMAL
DIFFERENTIAL METHOD BLD: ABNORMAL
EOSINOPHIL # BLD: 0.1 K/UL (ref 0–0.4)
EOSINOPHIL NFR BLD: 2 % (ref 0–5)
ERYTHROCYTE [DISTWIDTH] IN BLOOD BY AUTOMATED COUNT: 19.7 % (ref 11.6–14.5)
GLOBULIN SER CALC-MCNC: 4.5 G/DL (ref 2–4)
GLUCOSE SERPL-MCNC: 91 MG/DL (ref 74–99)
HCT VFR BLD AUTO: 23.6 % (ref 35–45)
HGB BLD-MCNC: 6.9 G/DL (ref 12–16)
HISTORY CHECKED?,CKHIST: NORMAL
LYMPHOCYTES # BLD: 0.9 K/UL (ref 0.9–3.6)
LYMPHOCYTES NFR BLD: 21 % (ref 21–52)
MAGNESIUM SERPL-MCNC: 1.9 MG/DL (ref 1.6–2.6)
MCH RBC QN AUTO: 20.2 PG (ref 24–34)
MCHC RBC AUTO-ENTMCNC: 29.2 G/DL (ref 31–37)
MCV RBC AUTO: 69 FL (ref 74–97)
MONOCYTES # BLD: 0.3 K/UL (ref 0.05–1.2)
MONOCYTES NFR BLD: 6 % (ref 3–10)
NEUTS SEG # BLD: 3.1 K/UL (ref 1.8–8)
NEUTS SEG NFR BLD: 70 % (ref 40–73)
P-R INTERVAL, ECG05: 172 MS
PLATELET # BLD AUTO: 404 K/UL (ref 135–420)
PLATELET COMMENTS,PCOM: ABNORMAL
PMV BLD AUTO: 9.7 FL (ref 9.2–11.8)
POTASSIUM SERPL-SCNC: 3.9 MMOL/L (ref 3.5–5.5)
PROT SERPL-MCNC: 7.1 G/DL (ref 6.4–8.2)
Q-T INTERVAL, ECG07: 364 MS
QRS DURATION, ECG06: 76 MS
QTC CALCULATION (BEZET), ECG08: 452 MS
RBC # BLD AUTO: 3.42 M/UL (ref 4.2–5.3)
RBC MORPH BLD: ABNORMAL
SODIUM SERPL-SCNC: 138 MMOL/L (ref 136–145)
TROPONIN I SERPL-MCNC: <0.02 NG/ML (ref 0–0.04)
VENTRICULAR RATE, ECG03: 93 BPM
WBC # BLD AUTO: 4.4 K/UL (ref 4.6–13.2)

## 2020-09-10 PROCEDURE — 36430 TRANSFUSION BLD/BLD COMPNT: CPT

## 2020-09-10 PROCEDURE — 83735 ASSAY OF MAGNESIUM: CPT

## 2020-09-10 PROCEDURE — 83880 ASSAY OF NATRIURETIC PEPTIDE: CPT

## 2020-09-10 PROCEDURE — 86923 COMPATIBILITY TEST ELECTRIC: CPT

## 2020-09-10 PROCEDURE — 84484 ASSAY OF TROPONIN QUANT: CPT

## 2020-09-10 PROCEDURE — 85025 COMPLETE CBC W/AUTO DIFF WBC: CPT

## 2020-09-10 PROCEDURE — 80053 COMPREHEN METABOLIC PANEL: CPT

## 2020-09-10 PROCEDURE — 99285 EMERGENCY DEPT VISIT HI MDM: CPT

## 2020-09-10 PROCEDURE — P9016 RBC LEUKOCYTES REDUCED: HCPCS

## 2020-09-10 PROCEDURE — 71045 X-RAY EXAM CHEST 1 VIEW: CPT

## 2020-09-10 PROCEDURE — 86900 BLOOD TYPING SEROLOGIC ABO: CPT

## 2020-09-10 PROCEDURE — 93005 ELECTROCARDIOGRAM TRACING: CPT

## 2020-09-10 RX ORDER — SODIUM CHLORIDE 9 MG/ML
250 INJECTION, SOLUTION INTRAVENOUS AS NEEDED
Status: DISCONTINUED | OUTPATIENT
Start: 2020-09-10 | End: 2020-09-10 | Stop reason: HOSPADM

## 2020-09-10 NOTE — DISCHARGE INSTRUCTIONS
Patient Education        Anemia: Care Instructions  Your Care Instructions     Anemia is a low level of red blood cells, which carry oxygen throughout your body. Many things can cause anemia. Lack of iron is one of the most common causes. Your body needs iron to make hemoglobin, a substance in red blood cells that carries oxygen from the lungs to your body's cells. Without enough iron, the body produces fewer and smaller red blood cells. As a result, your body's cells do not get enough oxygen, and you feel tired and weak. And you may have trouble concentrating. Bleeding is the most common cause of a lack of iron. You may have heavy menstrual bleeding or bleeding caused by conditions such as ulcers, hemorrhoids, or cancer. Regular use of aspirin or other anti-inflammatory medicines (such as ibuprofen) also can cause bleeding in some people. A lack of iron in your diet also can cause anemia, especially at times when the body needs more iron, such as during pregnancy, infancy, and the teen years. Your doctor may have prescribed iron pills. It may take several months of treatment for your iron levels to return to normal. Your doctor also may suggest that you eat foods that are rich in iron, such as meat and beans. There are many other causes of anemia. It is not always due to a lack of iron. Finding the specific cause of your anemia will help your doctor find the right treatment for you. Follow-up care is a key part of your treatment and safety. Be sure to make and go to all appointments, and call your doctor if you are having problems. It's also a good idea to know your test results and keep a list of the medicines you take. How can you care for yourself at home? · Take your medicines exactly as prescribed. Call your doctor if you think you are having a problem with your medicine.   · If your doctor recommends iron pills, take them as directed:  ? Try to take the pills on an empty stomach about 1 hour before or 2 hours after meals. But you may need to take iron with food to avoid an upset stomach. ? Do not take antacids or drink milk or caffeine drinks (such as coffee, tea, or cola) at the same time or within 2 hours of the time that you take your iron. They can make it hard for your body to absorb the iron. ? Vitamin C (from food or supplements) helps your body absorb iron. Try taking iron pills with a glass of orange juice or some other food that is high in vitamin C, such as citrus fruits. ? Iron pills may cause stomach problems, such as heartburn, nausea, diarrhea, constipation, and cramps. Be sure to drink plenty of fluids, and include fruits, vegetables, and fiber in your diet each day. Iron pills often make your bowel movements dark or green. ? If you forget to take an iron pill, do not take a double dose of iron the next time you take a pill. ? Keep iron pills out of the reach of small children. An overdose of iron can be very dangerous. · Follow your doctor's advice about eating iron-rich foods. These include red meat, shellfish, poultry, eggs, beans, raisins, whole-grain bread, and leafy green vegetables. · Steam vegetables to help them keep their iron content. When should you call for help? Call 911 anytime you think you may need emergency care. For example, call if:    · You have symptoms of a heart attack. These may include:  ? Chest pain or pressure, or a strange feeling in the chest.  ? Sweating. ? Shortness of breath. ? Nausea or vomiting. ? Pain, pressure, or a strange feeling in the back, neck, jaw, or upper belly or in one or both shoulders or arms. ? Lightheadedness or sudden weakness. ? A fast or irregular heartbeat. After you call 911, the  may tell you to chew 1 adult-strength or 2 to 4 low-dose aspirin. Wait for an ambulance. Do not try to drive yourself.     · You passed out (lost consciousness).    Call your doctor now or seek immediate medical care if:    · You have new or increased shortness of breath.     · You are dizzy or lightheaded, or you feel like you may faint.     · Your fatigue and weakness continue or get worse.     · You have any abnormal bleeding, such as:  ? Nosebleeds. ? Vaginal bleeding that is different (heavier, more frequent, at a different time of the month) than what you are used to.  ? Bloody or black stools, or rectal bleeding. ? Bloody or pink urine. Watch closely for changes in your health, and be sure to contact your doctor if:    · You do not get better as expected. Where can you learn more? Go to http://www.jiménez.com/  Enter R301 in the search box to learn more about \"Anemia: Care Instructions. \"  Current as of: November 8, 2019               Content Version: 12.6  © 2316-3408 [employer]. Care instructions adapted under license by [email or web address] (which disclaims liability or warranty for this information). If you have questions about a medical condition or this instruction, always ask your healthcare professional. [place] [address] any warranty or liability for your use of this information. Patient Education        Palpitations: Care Instructions  Your Care Instructions     Heart palpitations are the uncomfortable sensation that your heart is beating fast or irregularly. You might feel pounding or fluttering in your chest. It might feel like your heart is skipping a beat. Although palpitations may be caused by a heart problem, they also occur because of stress, fatigue, or use of alcohol, caffeine, or nicotine. Many medicines, including diet pills, antihistamines, decongestants, and some herbal products, can cause heart palpitations. Nearly everyone has palpitations from time to time. Depending on your symptoms, your doctor may need to do more tests to try to find the cause of your palpitations. Follow-up care is a key part of your treatment and safety.  Be sure to make and go to all appointments, and call your doctor if you are having problems. It's also a good idea to know your test results and keep a list of the medicines you take. How can you care for yourself at home? · Avoid caffeine, nicotine, and excess alcohol. · Do not take illegal drugs, such as methamphetamines and cocaine. · Do not take weight loss or diet medicines unless you talk with your doctor first.  · Get plenty of sleep. · Do not overeat. · If you have palpitations again, take deep breaths and try to relax. This may slow a racing heart. · If you start to feel lightheaded, lie down to avoid injuries that might result if you pass out and fall down. · Keep a record of your palpitations and bring it to your next doctor's appointment. Write down:  ? The date and time. ? Your pulse. (If your heart is beating fast, it may be hard to count your pulse.)  ? What you were doing when the palpitations started. ? How long the palpitations lasted. ? Any other symptoms. · If an activity causes palpitations, slow down or stop. Talk to your doctor before you do that activity again. · Take your medicines exactly as prescribed. Call your doctor if you think you are having a problem with your medicine. When should you call for help? Call 911 anytime you think you may need emergency care. For example, call if:    · You passed out (lost consciousness).     · You have symptoms of a heart attack. These may include:  ? Chest pain or pressure, or a strange feeling in the chest.  ? Sweating. ? Shortness of breath. ? Pain, pressure, or a strange feeling in the back, neck, jaw, or upper belly or in one or both shoulders or arms. ? Lightheadedness or sudden weakness. ? A fast or irregular heartbeat. After you call 911, the  may tell you to chew 1 adult-strength or 2 to 4 low-dose aspirin. Wait for an ambulance. Do not try to drive yourself.     · You have symptoms of a stroke.  These may include:  ? Sudden numbness, tingling, weakness, or loss of movement in your face, arm, or leg, especially on only one side of your body. ? Sudden vision changes. ? Sudden trouble speaking. ? Sudden confusion or trouble understanding simple statements. ? Sudden problems with walking or balance. ? A sudden, severe headache that is different from past headaches. Call your doctor now or seek immediate medical care if:    · You have heart palpitations and:  ? Are dizzy or lightheaded, or you feel like you may faint. ? Have new or increased shortness of breath. Watch closely for changes in your health, and be sure to contact your doctor if:    · You continue to have heart palpitations. Where can you learn more? Go to http://www.gray.com/  Enter R508 in the search box to learn more about \"Palpitations: Care Instructions. \"  Current as of: December 16, 2019               Content Version: 12.6  © 5214-0025 [employer]. Care instructions adapted under license by [employer] (which disclaims liability or warranty for this information). If you have questions about a medical condition or this instruction, always ask your healthcare professional. [place] [address] any warranty or liability for your use of this information.

## 2020-09-10 NOTE — ED TRIAGE NOTES
BIBA from home for palpitations with SOB.  on scene. COVID +. Was recently hospitalized for COVID and PNA. Pt states palpitations started at 0800 this morning with shortness of breath.   States finished all antibiotics

## 2020-09-10 NOTE — ED NOTES
I have reviewed discharge instructions with the patient. The patient verbalized understanding.   Pt leaves ED with steady gait

## 2020-09-10 NOTE — ED PROVIDER NOTES
EMERGENCY DEPARTMENT HISTORY AND PHYSICAL EXAM    11:26 AM      Date: 9/10/2020  Patient Name: Rahel Darling    History of Presenting Illness     Chief Complaint   Patient presents with    Palpitations         History Provided By: Patient    Chief Complaint: Palpitations  Duration:  Minutes  Timing:  Acute  Location: anterior chest  Quality: Tightness  Severity: Moderate  Modifying Factors: none  Associated Symptoms: sob      Additional History (Context): Rahel Darling is a 64 y.o. female with Hypertension, A. fib who presents with palpitations and chest pain. Patient states started around 8 AM.  Patient reports she was sitting down when this started. Reports heart felt racing. Some anterior chest tightness and shortness of breath has continued to worsen. Does report taking allergy meds. History of A. fib, is on Eliquis. Does report recent admission for pneumonia completed all antibiotics. At present time still complaining of some mild chest tightness. Denies ever any history of anxiety. PCP: Alejandro Barboza MD    Current Facility-Administered Medications   Medication Dose Route Frequency Provider Last Rate Last Dose    0.9% sodium chloride infusion 250 mL  250 mL IntraVENous PRN Vishnu Kirk DO         Current Outpatient Medications   Medication Sig Dispense Refill    apixaban (Eliquis) 5 mg tablet TAKE 1 TABLET BY MOUTH TWO TIMES A  Tab 3    metoprolol tartrate (LOPRESSOR) 25 mg tablet Take 0.5 Tabs by mouth two (2) times a day. Or as directed 60 Tab 5    diclofenac EC (VOLTAREN) 75 mg EC tablet Take 75 mg by mouth two (2) times a day.  cholecalciferol, vitamin D3, (VITAMIN D3) 2,000 unit tab Take  by mouth daily.  meclizine (ANTIVERT) 25 mg tablet Take  by mouth three (3) times daily as needed.  amLODIPine (NORVASC) 5 mg tablet Take 5 mg by mouth daily.          Past History     Past Medical History:  Past Medical History:   Diagnosis Date    Arthritis bl knees     Atrial fibrillation (HCC)     Essential hypertension        Past Surgical History:  No past surgical history on file. Family History:  Family History   Problem Relation Age of Onset    Cancer Mother     Diabetes Mother     Cancer Father     Heart Attack Brother        Social History:  Social History     Tobacco Use    Smoking status: Never Smoker    Smokeless tobacco: Never Used   Substance Use Topics    Alcohol use: No    Drug use: No       Allergies:  No Known Allergies      Review of Systems       Review of Systems   Constitutional: Negative for fever. Respiratory: Positive for shortness of breath. Cardiovascular: Positive for chest pain and palpitations. Negative for leg swelling. Gastrointestinal: Negative for abdominal pain, diarrhea and vomiting. All other systems reviewed and are negative. Physical Exam     Visit Vitals  /71   Pulse 75   Temp 98.9 °F (37.2 °C)   Resp 27   Ht 5' 2\" (1.575 m)   Wt 102.1 kg (225 lb)   SpO2 100%   BMI 41.15 kg/m²         Physical Exam  Constitutional:       Appearance: She is well-developed. HENT:      Head: Normocephalic and atraumatic. Neck:      Musculoskeletal: Neck supple. Vascular: No JVD. Cardiovascular:      Rate and Rhythm: Normal rate and regular rhythm. Pulmonary:      Effort: Pulmonary effort is normal. No respiratory distress. Breath sounds: Normal breath sounds. No stridor. No wheezing. Abdominal:      General: There is no distension. Palpations: Abdomen is soft. Tenderness: There is no abdominal tenderness. There is no guarding or rebound. Musculoskeletal:      Right lower leg: No edema. Left lower leg: No edema. Comments: No joint tenderness   Skin:     General: Skin is warm and dry. Findings: No erythema. Neurological:      Mental Status: She is alert and oriented to person, place, and time.    Psychiatric:         Judgment: Judgment normal.           Diagnostic Study Results     Labs -  Recent Results (from the past 12 hour(s))   EKG, 12 LEAD, INITIAL    Collection Time: 09/10/20 10:43 AM   Result Value Ref Range    Ventricular Rate 93 BPM    Atrial Rate 93 BPM    P-R Interval 172 ms    QRS Duration 76 ms    Q-T Interval 364 ms    QTC Calculation (Bezet) 452 ms    Calculated P Axis 64 degrees    Calculated R Axis 25 degrees    Calculated T Axis 22 degrees    Diagnosis       Normal sinus rhythm  Normal ECG  When compared with ECG of 26-AUG-2020 10:32,  Nonspecific T wave abnormality no longer evident in Lateral leads  Confirmed by Ene Motley M.D., [address] [place] [address] ([id number]) on 9/10/2020 12:41:28 PM     CBC WITH AUTOMATED DIFF    Collection Time: 09/10/20 10:58 AM   Result Value Ref Range    WBC 4.4 (L) 4.6 - 13.2 K/uL    RBC 3.42 (L) 4.20 - 5.30 M/uL    HGB 6.9 (L) 12.0 - 16.0 g/dL    HCT 23.6 (L) 35.0 - 45.0 %    MCV 69.0 (L) 74.0 - 97.0 FL    MCH 20.2 (L) 24.0 - 34.0 PG    MCHC 29.2 (L) 31.0 - 37.0 g/dL    RDW 19.7 (H) 11.6 - 14.5 %    PLATELET 947 729 - 724 K/uL    MPV 9.7 9.2 - 11.8 FL    NEUTROPHILS 70 40 - 73 %    LYMPHOCYTES 21 21 - 52 %    MONOCYTES 6 3 - 10 %    EOSINOPHILS 2 0 - 5 %    BASOPHILS 1 0 - 2 %    ABS. NEUTROPHILS 3.1 1.8 - 8.0 K/UL    ABS. LYMPHOCYTES 0.9 0.9 - 3.6 K/UL    ABS. MONOCYTES 0.3 0.05 - 1.2 K/UL    ABS. EOSINOPHILS 0.1 0.0 - 0.4 K/UL    ABS.  BASOPHILS 0.0 0.0 - 0.1 K/UL    DF AUTOMATED      PLATELET COMMENTS ADEQUATE PLATELETS      RBC COMMENTS ANISOCYTOSIS  1+        RBC COMMENTS POLYCHROMASIA  1+        RBC COMMENTS HYPOCHROMIA  1+        RBC COMMENTS OVALOCYTES  1+        RBC COMMENTS MICROCYTOSIS  1+       METABOLIC PANEL, COMPREHENSIVE    Collection Time: 09/10/20 10:58 AM   Result Value Ref Range    Sodium 138 136 - 145 mmol/L    Potassium 3.9 3.5 - 5.5 mmol/L    Chloride 109 100 - 111 mmol/L    CO2 21 21 - 32 mmol/L    Anion gap 8 3.0 - 18 mmol/L    Glucose 91 74 - 99 mg/dL    BUN 12 7.0 - 18 MG/DL    Creatinine 0.94 0.6 - 1.3 MG/DL    BUN/Creatinine ratio 13 12 - 20      GFR est AA >60 >60 ml/min/1.73m2    GFR est non-AA >60 >60 ml/min/1.73m2    Calcium 8.4 (L) 8.5 - 10.1 MG/DL    Bilirubin, total 0.7 0.2 - 1.0 MG/DL    ALT (SGPT) 23 13 - 56 U/L    AST (SGOT) 47 (H) 10 - 38 U/L    Alk. phosphatase 83 45 - 117 U/L    Protein, total 7.1 6.4 - 8.2 g/dL    Albumin 2.6 (L) 3.4 - 5.0 g/dL    Globulin 4.5 (H) 2.0 - 4.0 g/dL    A-G Ratio 0.6 (L) 0.8 - 1.7     TROPONIN I    Collection Time: 09/10/20 10:58 AM   Result Value Ref Range    Troponin-I, QT <0.02 0.0 - 0.045 NG/ML   NT-PRO BNP    Collection Time: 09/10/20 10:58 AM   Result Value Ref Range    NT pro- 0 - 900 PG/ML   MAGNESIUM    Collection Time: 09/10/20 10:58 AM   Result Value Ref Range    Magnesium 1.9 1.6 - 2.6 mg/dL   TYPE & SCREEN    Collection Time: 09/10/20 12:29 PM   Result Value Ref Range    Crossmatch Expiration 09/13/2020     ABO/Rh(D) Agnes Briggs POSITIVE     Antibody screen NEG     CALLED TO: [facility] 1356 ER BY TMR [id number]     Unit number [id number]     Blood component type RC LR,1     Unit division 00     Status of unit ISSUED     Crossmatch result Compatible    RBC, ALLOCATE    Collection Time: 09/10/20 12:30 PM   Result Value Ref Range    HISTORY CHECKED? Historical check performed        Radiologic Studies -   XR CHEST PORT   Final Result   IMPRESSION:       Interval increase in patchy bibasilar opacities when compared to previous exam.      Mild increase in cardiac silhouette which may be positional. Correlate   clinically. Please see report for additional findings and details. Medical Decision Making   I am the first provider for this patient. I reviewed the vital signs, available nursing notes, past medical history, past surgical history, family history and social history. Vital Signs-Reviewed the patient's vital signs. Pulse Oximetry Analysis -  100 on room air (Interpretation)nl     Cardiac Monitor:  Rate: 93  Rhythm:  Normal Sinus Rhythm     EKG:   Interpreted by the EP.   Time Interpreted: 1057   Rate: 93   Rhythm: Normal Sinus Rhythm    Interpretation: Normal axis, normal sinus rhythm, normal intervals, no ST changes   Comparison: 8/26/2020, no significant change    Records Reviewed: Nursing Notes, Old Medical Records and Previous electrocardiograms (Time of Review: 11:26 AM)    ED Course: Progress Notes, Reevaluation, and Consults:      Provider Notes (Medical Decision Making): 65-year-old female presenting with palpitations chest pain shortness of breath. Started this morning. Seems not consistent with ACS but screen with EKG troponin. No PE risk factors. Will screen for arrhythmia. History of A. fib but in normal sinus at this time. No pain into the back to suspect dissection. 12:15 PM  Discussed with the patient. Discussed her hemoglobin of 6.9. She is agreeable to blood transfusion today. Will give a unit here and then plan for likely DC home. Patient agreement with this plan. She states she has previously had transfusion but multiple years ago. 4:55 PM  Patient feeling better. Was transfused 1 unit. She is stable for discharge home. Diagnosis     Clinical Impression:   1. Palpitations    2. Anemia, unspecified type        Disposition: discharged    Follow-up Information     Follow up With Specialties Details Why Contact Info    Thais Gracia MD Internal Medicine Schedule an appointment as soon as possible for a visit in 1 week  [address] [place] [address] [address] [facility] EMERGENCY DEPT Emergency Medicine  As needed, If symptoms worsen [address]  [phone]           Patient's Medications   Start Taking    No medications on file   Continue Taking    AMLODIPINE (NORVASC) 5 MG TABLET    Take 5 mg by mouth daily. APIXABAN (ELIQUIS) 5 MG TABLET    TAKE 1 TABLET BY MOUTH TWO TIMES A DAY    CHOLECALCIFEROL, VITAMIN D3, (VITAMIN D3) 2,000 UNIT TAB    Take  by mouth daily.     DICLOFENAC EC (VOLTAREN) 75 MG EC TABLET    Take 75 mg by mouth two (2) times a day. MECLIZINE (ANTIVERT) 25 MG TABLET    Take  by mouth three (3) times daily as needed. METOPROLOL TARTRATE (LOPRESSOR) 25 MG TABLET    Take 0.5 Tabs by mouth two (2) times a day.  Or as directed   These Medications have changed    No medications on file   Stop Taking    No medications on file     _______________________________

## 2020-09-11 LAB
ABO + RH BLD: NORMAL
BLD PROD TYP BPU: NORMAL
BLOOD GROUP ANTIBODIES SERPL: NORMAL
BPU ID: NORMAL
CALLED TO:,BCALL1: NORMAL
CROSSMATCH RESULT,%XM: NORMAL
SPECIMEN EXP DATE BLD: NORMAL
STATUS OF UNIT,%ST: NORMAL
UNIT DIVISION, %UDIV: 0

## 2020-10-22 ENCOUNTER — OFFICE VISIT (OUTPATIENT)
Dept: CARDIOLOGY CLINIC | Age: 60
End: 2020-10-22
Payer: COMMERCIAL

## 2020-10-22 ENCOUNTER — DOCUMENTATION ONLY (OUTPATIENT)
Dept: CARDIOLOGY CLINIC | Age: 60
End: 2020-10-22

## 2020-10-22 VITALS
WEIGHT: 238 LBS | OXYGEN SATURATION: 97 % | DIASTOLIC BLOOD PRESSURE: 88 MMHG | HEART RATE: 67 BPM | HEIGHT: 62 IN | SYSTOLIC BLOOD PRESSURE: 130 MMHG | BODY MASS INDEX: 43.79 KG/M2

## 2020-10-22 DIAGNOSIS — R42 DIZZY SPELLS: ICD-10-CM

## 2020-10-22 DIAGNOSIS — I48.0 PAROXYSMAL ATRIAL FIBRILLATION (HCC): ICD-10-CM

## 2020-10-22 DIAGNOSIS — R00.2 PALPITATIONS: Primary | ICD-10-CM

## 2020-10-22 DIAGNOSIS — Z87.01 HISTORY OF RECENT PNEUMONIA: ICD-10-CM

## 2020-10-22 DIAGNOSIS — I49.5 SICK SINUS SYNDROME (HCC): ICD-10-CM

## 2020-10-22 DIAGNOSIS — Z79.01 ON APIXABAN THERAPY: ICD-10-CM

## 2020-10-22 DIAGNOSIS — I10 ESSENTIAL HYPERTENSION: ICD-10-CM

## 2020-10-22 DIAGNOSIS — M48.00 SPINAL STENOSIS, UNSPECIFIED SPINAL REGION: ICD-10-CM

## 2020-10-22 DIAGNOSIS — R07.9 CHEST PAIN, UNSPECIFIED TYPE: ICD-10-CM

## 2020-10-22 PROCEDURE — 93000 ELECTROCARDIOGRAM COMPLETE: CPT | Performed by: INTERNAL MEDICINE

## 2020-10-22 PROCEDURE — 99215 OFFICE O/P EST HI 40 MIN: CPT | Performed by: INTERNAL MEDICINE

## 2020-10-22 RX ORDER — UREA 10 %
100 LOTION (ML) TOPICAL DAILY
COMMUNITY

## 2020-10-22 RX ORDER — ACETAMINOPHEN 325 MG/1
TABLET ORAL
COMMUNITY

## 2020-10-22 RX ORDER — OMEPRAZOLE 20 MG/1
20 CAPSULE, DELAYED RELEASE ORAL DAILY
COMMUNITY
End: 2021-06-30

## 2020-10-22 RX ORDER — CYCLOBENZAPRINE HCL 10 MG
TABLET ORAL
COMMUNITY

## 2020-10-22 RX ORDER — BISMUTH SUBSALICYLATE 262 MG
1 TABLET,CHEWABLE ORAL DAILY
COMMUNITY

## 2020-10-22 NOTE — PATIENT INSTRUCTIONS
Instructions Patients Name:  Uriel Davalos 1. You are scheduled to have a Loop Recorder Implant on 10/28/2020  at 0800 Please check in at 0700  . 2. Please go to [facility] and park in the outpatient parking lot that is located around to the back of the hospital and enter through the [facility] building. Once you enter through the [facility] check in with the  there. The  will either give you directions or assist you in getting to the cath holding area. 3. You are not to eat or drink anything after midnight the night before your procedure. Small sips of water to take your medications is ok. 4. If you are diabetic, do not take your insulin/sugar pill the morning of the procedure. 5. MEDICATION INSTRUCTIONS:   Please take your morning medications with the following special instructions: 
 
[x]          Please make sure to take your Blood pressure medication 6. We encourage families to wait in the waiting room on the first floor while the procedure is being done. The Doctor will come out and talk with you as soon as the procedure is over. 7. There is the possibility that you may spend the night in the hospital, depending on the results of the procedure. This will be determined after the procedure is done. If angioplasty or stent is planned, you will stay at least one day. 8. If you or your family have any questions, please call our office Monday Friday, 9:00 a. m.4:30 p.m.,  At [phone], and ask to speak to one of the nurses.

## 2020-10-22 NOTE — PROGRESS NOTES
Dictation on: 10/22/2020  9:08 AM by: Alissa Redmond [[id number]] Past Medical History:  
Diagnosis Date  Arthritis   
 bl knees  Atrial fibrillation ([facility] [address]  Essential hypertension Current Outpatient Medications Medication Sig Dispense Refill  cyanocobalamin (Vitamin B-12) 100 mcg tablet Take 100 mcg by mouth daily.  multivitamin (ONE A DAY) tablet Take 1 Tab by mouth daily.  omeprazole (PRILOSEC) 20 mg capsule Take 20 mg by mouth daily.  cyclobenzaprine (FLEXERIL) 10 mg tablet Take  by mouth three (3) times daily as needed for Muscle Spasm(s).  acetaminophen (TylenoL) 325 mg tablet Take  by mouth every four (4) hours as needed for Pain.  apixaban (Eliquis) 5 mg tablet TAKE 1 TABLET BY MOUTH TWO TIMES A  Tab 3  
 metoprolol tartrate (LOPRESSOR) 25 mg tablet Take 0.5 Tabs by mouth two (2) times a day. Or as directed 60 Tab 5  
 diclofenac EC (VOLTAREN) 75 mg EC tablet Take 75 mg by mouth two (2) times a day.  cholecalciferol, vitamin D3, (VITAMIN D3) 2,000 unit tab Take  by mouth daily.  meclizine (ANTIVERT) 25 mg tablet Take  by mouth three (3) times daily as needed.  amLODIPine (NORVASC) 5 mg tablet Take 5 mg by mouth daily. Social History 
 reports that she has never smoked. She has never used smokeless tobacco. 
 reports no history of alcohol use. Family History 
family history includes Cancer in her father and mother; Diabetes in her mother; Heart Attack in her brother. Review of Systems Except as stated above include: 
Constitutional: Negative for fever, chills and malaise/fatigue. HEENT: No congestion or recent URI. Gastrointestinal: No nausea, vomiting, abdominal pain, bloody stools. Pulmonary:  Negative except as stated above. Cardiac:  Negative except as stated above. Musculoskeletal: Negative except as stated above. Neurological:  No localized symptoms. Skin:  Negative except as stated above. Psych:  Negative except as stated above. Endocrine:  Negative except as stated above. PHYSICAL EXAM 
BP Readings from Last 3 Encounters:  
10/22/20 130/88  
09/10/20 134/71  
08/26/20 129/89 Pulse Readings from Last 3 Encounters:  
10/22/20 67  
09/10/20 75  
08/26/20 91 Wt Readings from Last 3 Encounters:  
10/22/20 108 kg (238 lb)  
09/10/20 102.1 kg (225 lb)  
08/26/20 102.1 kg (225 lb) General:   Well developed, well groomed. Head/Neck:   No obvious jugular venous distention No obvious carotid pulsations. No evidence of xanthelasma. Lungs:   No respiratory distress. Clear bilaterally. Heart:  Regular rate and rhythm. Normal S1/S2. Palpation grossly normal. 
  No significant murmurs, rubs or gallops. Abdomen:   Non-acute abdomen. No obvious pulsations. Extremities:   Intact peripheral pulses. No significant edema. Neurological:   Alert and oriented to person, place, time. No focal neurological deficit visually. Skin:   No obvious rash Blood Pressure Metric: 
Monitor recommended and adjustments stated if needed.

## 2020-10-22 NOTE — PROGRESS NOTES
Dio Oliveira presents today for   Chief Complaint   Patient presents with    Follow-up     1 year follow up       Dio Oliveira preferred language for health care discussion is english/other. Is someone accompanying this pt? no    Is the patient using any DME equipment during [address]? no    Depression Screening:  3 most recent PHQ Screens 10/22/2020   Little interest or pleasure in doing things Not at all   Feeling down, depressed, irritable, or hopeless Not at all   Total Score PHQ 2 0       Learning Assessment:  Learning Assessment 10/17/2019   PRIMARY LEARNER Patient   PRIMARY LANGUAGE ENGLISH   LEARNER PREFERENCE PRIMARY DEMONSTRATION   ANSWERED BY Patient   RELATIONSHIP SELF       Abuse Screening:  Abuse Screening Questionnaire 10/22/2020   Do you ever feel afraid of your partner? N   Are you in a relationship with someone who physically or mentally threatens you? N   Is it safe for you to go home? Y       Fall Risk  Fall Risk Assessment, last 12 mths 10/22/2020   Able to walk? Yes   Fall in past 12 months? No       Pt currently taking Anticoagulant therapy? Eliquis 5 mg twice a day    Coordination of Care:  1. Have you been to the ER, urgent care clinic since your last visit? Hospitalized since your last visit? Yes     2. Have you seen or consulted any other health care providers outside of the [address] [place] [address] since your last visit? Include any pap smears or colon screening.  no

## 2020-10-22 NOTE — LETTER
10/22/2020 9:15 AM 
 
 
 
Warden Toby Richards 
[id number] 
1960 Insurance:  [id number] [facility] [id number] # _____________________ Proc Date: Wed. 10/28                Proc Time:  8:00am 
 
Performing MD : Dr. Ranulfo Morocho                      Procedure:Loop Implant Hospital:  [facility]                                            PCP Dr. Dorian Morris Scheduled with:  Terri/Email                                                        Date:10/22/2020 HP:10/22   EKG: ______    Labs:______  CXR: _______  Orders:  10/22 Special Instructions:  _____________________________________________________ 
______________________________________________________________________ 
______________________________________________________________________ Date Faxed:   ______________   Pages Faxed: ___________________ The materials enclosed with this facsimile transmission are private and confidential and are the property of the sender. If you are not the intended recipient, be advised that any unauthorized use, disclosure, copying, distribution, or the taking of any action in reliance on the contents of this telecopied information is strictly prohibited. If you have received this in error, please immediately notify the sender via telephone to arrange for return of the forwarded documentation.

## 2020-10-22 NOTE — PROGRESS NOTES
HPI:  64 y.o. female here for follow-up. For the last couple of months she has been in and out of the emergency department and also been hospitalized for a pneumonia at [facility]. She has multiple ER visits for palpitations and dizziness, happened multiple times up to a few times a week, no obvious trigger. She does have a history of atrial fibrillation as well as an element of sick sinus syndrome. She could not tolerate the event monitor due to the adhesive causing significant headaches and skin irritation. She does not want to try it again. She has also had increasing dyspnea. She had been exposed to Ev Shirts in a nursing facility although she had ruled out for it at [facility] in September. She continues to have the daily events. Impression: 1. Recurrent dizziness and palpitations unclear if atrial fibrillation or blood pressure issue. 2. Recent pneumonia with admission to [facility] with antibiotics and COVID rule out negative, September 2020. 3. Atypical chest pain. 4. History of paroxysmal atrial fibrillation on Eliquis. 5. History of recent anemia with heavy menstrual bleeding on iron replacement. 6. GERD. 7. Chronic Eliquis use. 8. Sick sinus syndrome, treatment with beta blocker. 9. History of hypertension. 10. History of spinal stenosis. At this point it is unclear if all of these symptoms are simply related to her recent pneumonia or atrial fibrillation exacerbated or even her sick sinus syndrome. She also has a history of anemia. I am going to check blood counts. She is unable to tolerate an external loop recorder. I am going to schedule her for implant. If she is even more anemic, we will need to go ahead and stop her Eliquis and she may need to be seen for transfusion.

## 2020-10-22 NOTE — PROGRESS NOTES
No [facility] Prior Authorization Required For Loop Recorder Implant On 10/28/20 With Dr. Constanza Castaneda

## 2020-10-22 NOTE — Clinical Note
HPI:  64 y.o. female here for follow-up. For the last couple of months she has been in and out of the emergency department and also been hospitalized for a pneumonia at [facility]. She has multiple ER visits for palpitations and dizziness, happened multiple times up to a few times a week, no obvious trigger. She does have a history of atrial fibrillation as well as an element of sick sinus syndrome. She could not tolerate the event monitor due to the adhesive causing significant headaches and skin irritation. She does not want to try it again. She has also had increasing dyspnea. She had been exposed to [place] in a nursing facility although she had ruled out for it at [facility] in September. She continues to have the daily events. Impression: 1. Recurrent dizziness and palpitations unclear if atrial fibrillation or blood pressure issue. 2. Recent pneumonia with admission to [facility] with antibiotics and COVID rule out negative, September 2020. 3. Atypical chest pain. 4. History of paroxysmal atrial fibrillation on Eliquis. 5. History of recent anemia with heavy menstrual bleeding on iron replacement. 6. GERD. 7. Chronic Eliquis use. 8. Sick sinus syndrome, treatment with beta blocker. 9. History of hypertension. 10. History of spinal stenosis. At this point it is unclear if all of these symptoms are simply related to her recent pneumonia or atrial fibrillation exacerbated or even her sick sinus syndrome. She also has a history of anemia. I am going to check blood counts. She is unable to tolerate an external loop recorder. I am going to schedule her for implant. If she is even more anemic, we will need to go ahead and stop her Eliquis and she may need to be seen for transfusion. Past Medical History:  
Diagnosis Date  Arthritis   
 bl knees  Atrial fibrillation ([address]  Essential hypertension Current Outpatient Medications Medication Sig Dispense Refill  cyanocobalamin (Vitamin B-12) 100 mcg tablet Take 100 mcg by mouth daily.  multivitamin (ONE A DAY) tablet Take 1 Tab by mouth daily.  omeprazole (PRILOSEC) 20 mg capsule Take 20 mg by mouth daily.  cyclobenzaprine (FLEXERIL) 10 mg tablet Take  by mouth three (3) times daily as needed for Muscle Spasm(s).  acetaminophen (TylenoL) 325 mg tablet Take  by mouth every four (4) hours as needed for Pain.  apixaban (Eliquis) 5 mg tablet TAKE 1 TABLET BY MOUTH TWO TIMES A  Tab 3  
 metoprolol tartrate (LOPRESSOR) 25 mg tablet Take 0.5 Tabs by mouth two (2) times a day. Or as directed 60 Tab 5  
 diclofenac EC (VOLTAREN) 75 mg EC tablet Take 75 mg by mouth two (2) times a day.  cholecalciferol, vitamin D3, (VITAMIN D3) 2,000 unit tab Take  by mouth daily.  meclizine (ANTIVERT) 25 mg tablet Take  by mouth three (3) times daily as needed.  amLODIPine (NORVASC) 5 mg tablet Take 5 mg by mouth daily. Social History 
 reports that she has never smoked. She has never used smokeless tobacco. 
 reports no history of alcohol use. Family History 
family history includes Cancer in her father and mother; Diabetes in her mother; Heart Attack in her brother. Review of Systems Except as stated above include: 
Constitutional: Negative for fever, chills and malaise/fatigue. HEENT: No congestion or recent URI. Gastrointestinal: No nausea, vomiting, abdominal pain, bloody stools. Pulmonary:  Negative except as stated above. Cardiac:  Negative except as stated above. Musculoskeletal: Negative except as stated above. Neurological:  No localized symptoms. Skin:  Negative except as stated above. Psych:  Negative except as stated above. Endocrine:  Negative except as stated above. PHYSICAL EXAM 
BP Readings from Last 3 Encounters:  
10/22/20 130/88  
09/10/20 134/71  
08/26/20 129/89 Pulse Readings from Last 3 Encounters:  
10/22/20 67  
09/10/20 75  
08/26/20 91  
 
 Wt Readings from Last 3 Encounters:  
10/22/20 108 kg (238 lb)  
09/10/20 102.1 kg (225 lb)  
08/26/20 102.1 kg (225 lb) General:   Well developed, well groomed. Head/Neck:   No obvious jugular venous distention No obvious carotid pulsations. No evidence of xanthelasma. Lungs:   No respiratory distress. Clear bilaterally. Heart:  Regular rate and rhythm. Normal S1/S2. Palpation grossly normal. 
  No significant murmurs, rubs or gallops. Abdomen:   Non-acute abdomen. No obvious pulsations. Extremities:   Intact peripheral pulses. No significant edema. Neurological:   Alert and oriented to person, place, time. No focal neurological deficit visually. Skin:   No obvious rash Blood Pressure Metric: 
Monitor recommended and adjustments stated if needed.

## 2020-10-23 ENCOUNTER — HOSPITAL ENCOUNTER (OUTPATIENT)
Dept: LAB | Age: 60
Discharge: HOME | End: 2020-10-23
Payer: COMMERCIAL

## 2020-10-23 DIAGNOSIS — I49.5 SICK SINUS SYNDROME (HCC): ICD-10-CM

## 2020-10-23 DIAGNOSIS — I48.0 PAROXYSMAL ATRIAL FIBRILLATION (HCC): ICD-10-CM

## 2020-10-23 DIAGNOSIS — M48.00 SPINAL STENOSIS, UNSPECIFIED SPINAL REGION: ICD-10-CM

## 2020-10-23 DIAGNOSIS — Z87.01 HISTORY OF RECENT PNEUMONIA: ICD-10-CM

## 2020-10-23 DIAGNOSIS — R00.2 PALPITATIONS: ICD-10-CM

## 2020-10-23 DIAGNOSIS — Z79.01 ON APIXABAN THERAPY: ICD-10-CM

## 2020-10-23 DIAGNOSIS — I10 ESSENTIAL HYPERTENSION: ICD-10-CM

## 2020-10-23 DIAGNOSIS — R07.9 CHEST PAIN, UNSPECIFIED TYPE: ICD-10-CM

## 2020-10-23 LAB
ALBUMIN SERPL-MCNC: 3.4 G/DL (ref 3.4–5)
ALBUMIN/GLOB SERPL: 0.9 {RATIO} (ref 0.8–1.7)
ALP SERPL-CCNC: 94 U/L (ref 45–117)
ALT SERPL-CCNC: 34 U/L (ref 13–56)
ANION GAP SERPL CALC-SCNC: 5 MMOL/L (ref 3–18)
AST SERPL-CCNC: 31 U/L (ref 10–38)
BASOPHILS # BLD: 0 K/UL (ref 0–0.1)
BASOPHILS NFR BLD: 1 % (ref 0–2)
BILIRUB SERPL-MCNC: 0.5 MG/DL (ref 0.2–1)
BUN SERPL-MCNC: 16 MG/DL (ref 7–18)
BUN/CREAT SERPL: 17 (ref 12–20)
CALCIUM SERPL-MCNC: 8.9 MG/DL (ref 8.5–10.1)
CHLORIDE SERPL-SCNC: 113 MMOL/L (ref 100–111)
CO2 SERPL-SCNC: 24 MMOL/L (ref 21–32)
CREAT SERPL-MCNC: 0.93 MG/DL (ref 0.6–1.3)
DIFFERENTIAL METHOD BLD: ABNORMAL
EOSINOPHIL # BLD: 0.3 K/UL (ref 0–0.4)
EOSINOPHIL NFR BLD: 9 % (ref 0–5)
ERYTHROCYTE [DISTWIDTH] IN BLOOD BY AUTOMATED COUNT: 20.4 % (ref 11.6–14.5)
GLOBULIN SER CALC-MCNC: 3.7 G/DL (ref 2–4)
GLUCOSE SERPL-MCNC: 73 MG/DL (ref 74–99)
HCT VFR BLD AUTO: 27.1 % (ref 35–45)
HGB BLD-MCNC: 8 G/DL (ref 12–16)
INR PPP: 1.2 (ref 0.8–1.2)
LYMPHOCYTES # BLD: 1.2 K/UL (ref 0.9–3.6)
LYMPHOCYTES NFR BLD: 33 % (ref 21–52)
MCH RBC QN AUTO: 21.5 PG (ref 24–34)
MCHC RBC AUTO-ENTMCNC: 29.5 G/DL (ref 31–37)
MCV RBC AUTO: 72.8 FL (ref 74–97)
MONOCYTES # BLD: 0.2 K/UL (ref 0.05–1.2)
MONOCYTES NFR BLD: 5 % (ref 3–10)
NEUTS SEG # BLD: 2 K/UL (ref 1.8–8)
NEUTS SEG NFR BLD: 52 % (ref 40–73)
PLATELET # BLD AUTO: 293 K/UL (ref 135–420)
PLATELET COMMENTS,PCOM: ABNORMAL
PMV BLD AUTO: 9.6 FL (ref 9.2–11.8)
POTASSIUM SERPL-SCNC: 4.6 MMOL/L (ref 3.5–5.5)
PROT SERPL-MCNC: 7.1 G/DL (ref 6.4–8.2)
PROTHROMBIN TIME: 14.6 SEC (ref 11.5–15.2)
RBC # BLD AUTO: 3.72 M/UL (ref 4.2–5.3)
RBC MORPH BLD: ABNORMAL
SODIUM SERPL-SCNC: 142 MMOL/L (ref 136–145)
WBC # BLD AUTO: 3.7 K/UL (ref 4.6–13.2)

## 2020-10-23 PROCEDURE — 85025 COMPLETE CBC W/AUTO DIFF WBC: CPT

## 2020-10-23 PROCEDURE — 80053 COMPREHEN METABOLIC PANEL: CPT

## 2020-10-23 PROCEDURE — 85610 PROTHROMBIN TIME: CPT

## 2020-10-23 PROCEDURE — 36415 COLL VENOUS BLD VENIPUNCTURE: CPT

## 2020-10-26 NOTE — PROGRESS NOTES
Per your last note \" Loop recorder implant on 10/28/20 \" At this point it is unclear if all of these symptoms are simply related to her recent pneumonia or atrial fibrillation exacerbated or even her sick sinus syndrome. She also has a history of anemia. I am going to check blood counts. She is unable to tolerate an external loop recorder.   I am going to schedule her for implant.

## 2020-10-27 NOTE — H&P
Plan subq loop implant as previously discussed. Taking eliquis, discussed bleeding risk but superficial incision planned.  hgb noted to be 8, stable. All questions answered. HPI:  64 y.o. female here for follow-up. For the last couple of months she has been in and out of the emergency department and also been hospitalized for a pneumonia at [facility]. She has multiple ER visits for palpitations and dizziness, happened multiple times up to a few times a week, no obvious trigger. She does have a history of atrial fibrillation as well as an element of sick sinus syndrome. She could not tolerate the event monitor due to the adhesive causing significant headaches and skin irritation. She does not want to try it again. She has also had increasing dyspnea. She had been exposed to [place] in a nursing facility although she had ruled out for it at [facility] in September. She continues to have the daily events.      Impression:   1. Recurrent dizziness and palpitations unclear if atrial fibrillation or blood pressure issue. 2. Recent pneumonia with admission to [facility] with antibiotics and COVID rule out negative, September 2020. 3. Atypical chest pain. 4. History of paroxysmal atrial fibrillation on Eliquis. 5. History of recent anemia with heavy menstrual bleeding on iron replacement. 6. GERD. 7. Chronic Eliquis use. 8. Sick sinus syndrome, treatment with beta blocker. 9. History of hypertension. 10. History of spinal stenosis.      At this point it is unclear if all of these symptoms are simply related to her recent pneumonia or atrial fibrillation exacerbated or even her sick sinus syndrome. She also has a history of anemia. I am going to check blood counts. She is unable to tolerate an external loop recorder.   I am going to schedule her for implant.     If she is even more anemic, we will need to go ahead and stop her Eliquis and she may need to be seen for transfusion.            Past Medical History:   Diagnosis Date    Arthritis       bl knees     Atrial fibrillation (HCC)      Essential hypertension                  Current Outpatient Medications   Medication Sig Dispense Refill    cyanocobalamin (Vitamin B-12) 100 mcg tablet Take 100 mcg by mouth daily.        multivitamin (ONE A DAY) tablet Take 1 Tab by mouth daily.        omeprazole (PRILOSEC) 20 mg capsule Take 20 mg by mouth daily.        cyclobenzaprine (FLEXERIL) 10 mg tablet Take  by mouth three (3) times daily as needed for Muscle Spasm(s).        acetaminophen (TylenoL) 325 mg tablet Take  by mouth every four (4) hours as needed for Pain.        apixaban (Eliquis) 5 mg tablet TAKE 1 TABLET BY MOUTH TWO TIMES A  Tab 3    metoprolol tartrate (LOPRESSOR) 25 mg tablet Take 0.5 Tabs by mouth two (2) times a day. Or as directed 60 Tab 5    diclofenac EC (VOLTAREN) 75 mg EC tablet Take 75 mg by mouth two (2) times a day.        cholecalciferol, vitamin D3, (VITAMIN D3) 2,000 unit tab Take  by mouth daily.        meclizine (ANTIVERT) 25 mg tablet Take  by mouth three (3) times daily as needed.        amLODIPine (NORVASC) 5 mg tablet Take 5 mg by mouth daily.             Social History   reports that she has never smoked. She has never used smokeless tobacco.   reports no history of alcohol use.     Family History  family history includes Cancer in her father and mother; Diabetes in her mother; Heart Attack in her brother.     Review of Systems  Except as stated above include:  Constitutional: Negative for fever, chills and malaise/fatigue. HEENT: No congestion or recent URI. Gastrointestinal: No nausea, vomiting, abdominal pain, bloody stools. Pulmonary:  Negative except as stated above. Cardiac:  Negative except as stated above. Musculoskeletal: Negative except as stated above. Neurological:  No localized symptoms. Skin:  Negative except as stated above. Psych:  Negative except as stated above.   Endocrine:  Negative except as stated above.     PHYSICAL EXAM      BP Readings from Last 3 Encounters:   10/22/20 130/88   09/10/20 134/71   08/26/20 129/89      Pulse Readings from Last 3 Encounters:   10/22/20 67   09/10/20 75   08/26/20 91          Wt Readings from Last 3 Encounters:   10/22/20 108 kg (238 lb)   09/10/20 102.1 kg (225 lb)   08/26/20 102.1 kg (225 lb)      General:          Well developed, well groomed. Head/Neck:     No obvious jugular venous distention                           No obvious carotid pulsations. No evidence of xanthelasma. Lungs:             No respiratory distress. Clear bilaterally. Heart:              Regular rate and rhythm. Normal S1/S2. Palpation grossly normal.                          No significant murmurs, rubs or gallops. Abdomen:        Non-acute abdomen. No obvious pulsations. Extremities:     Intact peripheral pulses. No significant edema. Neurological:   Alert and oriented to person, place, time. No focal neurological deficit visually.   Skin:                No obvious rash     Blood Pressure Metric:  Monitor recommended and adjustments stated if needed.            Electronically signed by Gisela Baca MD at 10/22/20 2502

## 2020-10-28 ENCOUNTER — HOSPITAL ENCOUNTER (OUTPATIENT)
Age: 60
Setting detail: OUTPATIENT SURGERY
Discharge: HOME | End: 2020-10-28
Attending: INTERNAL MEDICINE | Admitting: INTERNAL MEDICINE
Payer: COMMERCIAL

## 2020-10-28 DIAGNOSIS — Z95.9 CARDIAC DEVICE IN SITU: Primary | ICD-10-CM

## 2020-10-28 DIAGNOSIS — R00.2 PALPITATIONS: ICD-10-CM

## 2020-10-28 DIAGNOSIS — I48.91 ATRIAL FIBRILLATION (HCC): ICD-10-CM

## 2020-10-28 PROCEDURE — 74011250636 HC RX REV CODE- 250/636: Performed by: INTERNAL MEDICINE

## 2020-10-28 PROCEDURE — C1764 EVENT RECORDER, CARDIAC: HCPCS | Performed by: INTERNAL MEDICINE

## 2020-10-28 PROCEDURE — 77030018729 HC ELECTRD DEFIB PAD CARD -B: Performed by: INTERNAL MEDICINE

## 2020-10-28 PROCEDURE — 77030012935 HC DRSG AQUACEL BMS -B: Performed by: INTERNAL MEDICINE

## 2020-10-28 PROCEDURE — 33285 INSJ SUBQ CAR RHYTHM MNTR: CPT | Performed by: INTERNAL MEDICINE

## 2020-10-28 PROCEDURE — 74011000250 HC RX REV CODE- 250: Performed by: INTERNAL MEDICINE

## 2020-10-28 PROCEDURE — 77030002933 HC SUT MCRYL J&J -A: Performed by: INTERNAL MEDICINE

## 2020-10-28 DEVICE — MON LNQ11 REVEAL LINQ USA FW2.0
Type: IMPLANTABLE DEVICE | Status: FUNCTIONAL
Brand: REVEAL LINQ™

## 2020-10-28 RX ORDER — CEFAZOLIN SODIUM 2 G/50ML
SOLUTION INTRAVENOUS
Status: DISCONTINUED
Start: 2020-10-28 | End: 2020-10-28 | Stop reason: HOSPADM

## 2020-10-28 RX ORDER — LIDOCAINE HYDROCHLORIDE 10 MG/ML
INJECTION, SOLUTION EPIDURAL; INFILTRATION; INTRACAUDAL; PERINEURAL AS NEEDED
Status: DISCONTINUED | OUTPATIENT
Start: 2020-10-28 | End: 2020-10-28 | Stop reason: HOSPADM

## 2020-10-28 RX ORDER — OXYCODONE AND ACETAMINOPHEN 5; 325 MG/1; MG/1
1 TABLET ORAL
Qty: 12 TAB | Refills: 0 | Status: SHIPPED | OUTPATIENT
Start: 2020-10-28 | End: 2020-10-31

## 2020-10-28 RX ORDER — CEFAZOLIN SODIUM 1 G/3ML
INJECTION, POWDER, FOR SOLUTION INTRAMUSCULAR; INTRAVENOUS AS NEEDED
Status: DISCONTINUED | OUTPATIENT
Start: 2020-10-28 | End: 2020-10-28 | Stop reason: HOSPADM

## 2020-10-28 NOTE — Clinical Note
TRANSFER - OUT REPORT:     [facility]. Report consisted of patient's Situation, Background, Assessment and   Recommendations(SBAR). Opportunity for questions and clarification was provided. Patient transported to: Room 19.

## 2020-10-28 NOTE — DISCHARGE INSTRUCTIONS
Patient Education        Learning About Implantable Heart Monitors  What is an implantable heart monitor? An implantable heart monitor is a small device placed under the skin of your chest. It records the electrical signals from your heart. A monitor is used to look for irregular heartbeats. It can help your doctor find out what is causing your fainting, lightheadedness, or other symptoms. It also can help your doctor check to see if treatment for an irregular heartbeat is working. The monitor may be placed near your collarbone or near the middle of your chest. Where it's placed depends on the size and type of device. The monitor may be about the size of a small pack of chewing gum or a paper clip. These monitors are used if an irregular heart rhythm or your symptoms don't happen very often. They also help your doctor monitor your heart for a long time. How is the monitor put in place? The monitor is put in during a short surgery. You will get medicine to numb the area of your chest where the monitor will be put in. You will be awake during the surgery, but you shouldn't feel any pain. Your doctor will make a small cut and place the monitor under your skin. Then he or she will close the cut with special tape or glue or with stitches that will dissolve. Then the doctor will place a bandage over the cut. The procedure will take about half an hour. You probably will be able to go home soon after it's done. You may have some minor pain where the cut was made. You will get instructions from your doctor on how to care for it at home. When your doctor says it's safe, you should be able to get back to your normal activities. How is the monitor used? Your monitor may start recording on its own when it detects an abnormal heartbeat. Or you might use a handheld device to start the monitor when you have symptoms. Your doctor will explain which type of monitor you have and what you need to do.   Your doctor will tell you how often he or she will need to check your monitor. The information from your monitor may be sent to your doctor automatically. Or you may have to do something to send it. It may be sent over a phone line or online. You will get instructions from your doctor. Your information will stay private and secure. You will also have checkups in person. You may need to keep a symptom diary while you have the monitor. This means that you will write down the times you have symptoms and what you were doing when they started. Your doctor will let you know how to do this. He or she can then look at your heart monitor records to see if your heart rhythms changed when you had symptoms. After your doctor gets the information he or she needs, the monitor will be removed from your chest.  What else should you know about living with a heart monitor? You will get a medical ID card with information about your heart monitor. Keep it with you at all times. You'll need to use certain electric devices with caution. Some devices have a strong electromagnetic field. This field can keep your monitor from working right. Check with your doctor about what you need to avoid and what you need to keep a short distance away from your monitor. Many household and office electronics do not affect your monitor. Your doctor or the maker of your heart monitor can give you a full list of things to avoid. When should you call for help? Call your doctor now or seek immediate medical care if:  · You have symptoms of infection, such as:  ? Increased pain, swelling, warmth, or redness around the cut.  ? Red streaks leading from the cut.  ? Pus draining from the cut.  ? A fever. Watch closely for changes in your health, and be sure to contact your doctor if:  · You have any problems with your heart monitor. Follow-up care is a key part of your treatment and safety.  Be sure to make and go to all appointments, and call your doctor if you are having problems. It's also a good idea to know your test results and keep a list of the medicines you take. Where can you learn more? Go to http://www.gray.com/  Enter Q030 in the search box to learn more about \"Learning About Implantable Heart Monitors. \"  Current as of: December 16, 2019               Content Version: 12.6  © 3991-8737 [employer], [employer]. Care instructions adapted under license by [employer] (which disclaims liability or warranty for this information). If you have questions about a medical condition or this instruction, always ask your healthcare professional. [place] [address] any warranty or liability for your use of this information.

## 2020-10-28 NOTE — PROGRESS NOTES
I have reviewed discharge instructions with the patient. The patient verbalized understanding. AVS Discharge instructions reviewed with patient and copy given to patient. All questions answered. Patient verbalized understanding to all discharge instructions. PIV removed. Procedural site within normal limits. No hematoma or bleeding noted from procedural and PIV site. No pain noted at discharge. Patient discharged with support person in stable condition. Escorted out to vehicle for transport home.

## 2020-10-28 NOTE — Clinical Note
TRANSFER - IN REPORT:     Verbal report received from: [address]. Report consisted of patient's Situation, Background, Assessment and   Recommendations(SBAR). Opportunity for questions and clarification was provided. Assessment completed upon patient's arrival to unit and care assumed.

## 2020-10-28 NOTE — PROCEDURES
Procedure:  1. Implantation Medtronic Linq Implantable Loop Recorder (ILR). Procedure:  After informed consent was obtained, the patient was brought to the electrophysiology lab in a fasting and nonsedated state. The left chest was prepped and drapped in the usual sterile fashion. Local lidocaine was infiltratred 2 cm to the left of the sternum near the 4th rib. A small 1 cm incision was created and the Medtronic Linq device placed through the skin. The end of the device was used to tunnel a track at a 45 degree angle. The loop recorder was deployed just under the skin. Hemostasis was confirmed. A 4-0 monocryl suture was placed just under the skin. Dressing applied. The patient left the lab in stable condition. Impression:  Successful implantation of subcutaneous Linq loop recorder. Plan to discharge later today.

## 2021-02-03 ENCOUNTER — OFFICE VISIT (OUTPATIENT)
Dept: CARDIOLOGY CLINIC | Age: 61
End: 2021-02-03
Payer: COMMERCIAL

## 2021-02-03 VITALS
BODY MASS INDEX: 43.43 KG/M2 | HEART RATE: 55 BPM | DIASTOLIC BLOOD PRESSURE: 88 MMHG | WEIGHT: 236 LBS | OXYGEN SATURATION: 100 % | HEIGHT: 62 IN | SYSTOLIC BLOOD PRESSURE: 132 MMHG

## 2021-02-03 DIAGNOSIS — I48.0 PAROXYSMAL ATRIAL FIBRILLATION (HCC): ICD-10-CM

## 2021-02-03 DIAGNOSIS — I47.1 SVT (SUPRAVENTRICULAR TACHYCARDIA) (HCC): Primary | ICD-10-CM

## 2021-02-03 DIAGNOSIS — Z79.01 ON APIXABAN THERAPY: ICD-10-CM

## 2021-02-03 DIAGNOSIS — Z95.9 CARDIAC DEVICE IN SITU: ICD-10-CM

## 2021-02-03 DIAGNOSIS — Z95.818 STATUS POST PLACEMENT OF IMPLANTABLE LOOP RECORDER: ICD-10-CM

## 2021-02-03 DIAGNOSIS — I10 ESSENTIAL HYPERTENSION: ICD-10-CM

## 2021-02-03 DIAGNOSIS — R00.2 PALPITATIONS: ICD-10-CM

## 2021-02-03 DIAGNOSIS — I48.91 NEW ONSET ATRIAL FIBRILLATION (HCC): ICD-10-CM

## 2021-02-03 PROCEDURE — 99215 OFFICE O/P EST HI 40 MIN: CPT | Performed by: INTERNAL MEDICINE

## 2021-02-03 RX ORDER — METOPROLOL TARTRATE 25 MG/1
25 TABLET, FILM COATED ORAL 2 TIMES DAILY
Qty: 180 TAB | Refills: 3 | Status: ON HOLD | OUTPATIENT
Start: 2021-02-03 | End: 2021-04-26 | Stop reason: SDUPTHER

## 2021-02-03 RX ORDER — AMLODIPINE BESYLATE 5 MG/1
5 TABLET ORAL DAILY
Qty: 90 TAB | Refills: 3 | Status: SHIPPED | OUTPATIENT
Start: 2021-02-03 | End: 2021-04-07

## 2021-02-03 NOTE — PROGRESS NOTES
Brannon Ray presents today for   Chief Complaint   Patient presents with    Follow-up     3 month follow up, called requesting appt. Brannon Ray preferred language for health care discussion is english/other. Is someone accompanying this pt? no    Is the patient using any DME equipment during [address]? no    Depression Screening:  3 most recent PHQ Screens 2/3/2021   Little interest or pleasure in doing things Not at all   Feeling down, depressed, irritable, or hopeless Not at all   Total Score PHQ 2 0       Learning Assessment:  Learning Assessment 2/3/2021   PRIMARY LEARNER Patient   PRIMARY LANGUAGE ENGLISH   LEARNER PREFERENCE PRIMARY DEMONSTRATION   ANSWERED BY patient   RELATIONSHIP SELF       Abuse Screening:  Abuse Screening Questionnaire 2/3/2021   Do you ever feel afraid of your partner? N   Are you in a relationship with someone who physically or mentally threatens you? N   Is it safe for you to go home? Y       Fall Risk  Fall Risk Assessment, last 12 mths 2/3/2021   Able to walk? Yes   Fall in past 12 months? 0   Do you feel unsteady? 0   Are you worried about falling 0       Pt currently taking Anticoagulant therapy? Eliquis 5 mg twice a day    Coordination of Care:  1. Have you been to the ER, urgent care clinic since your last visit? Hospitalized since your last visit? no    2. Have you seen or consulted any other health care providers outside of the [address] [place] [address] since your last visit? Include any pap smears or colon screening.  no

## 2021-02-03 NOTE — PROGRESS NOTES
History of Present Illness:  64year old female here for follow up. She works in nursing. She has been having some increasing palpitations without syncope. Her heart rates went up to 160s at times. She denies any new chest pain or syncope. She does get a little short of breath at times. She has been taking Metoprolol low dose, limited due to some sinus bradycardia at times. She had a loop recorder placed October, 2020. She had been hospitalized for pneumonia at [facility] and had some atrial fibrillation at that time and underwent a loop recorder in October. Impression:  1. Recurrent dizziness, palpitations, likely multifactorial.  2. History of paroxysmal atrial fibrillation, on Eliquis. 3. History of pneumonia September, 2020 at [facility] with antibiotics. 4. SVT with heart rate of 160s by loop recorder. 5. Loop recorder insert October, 2020.  6. History of previous anemia and heavy menstrual bleeding, on iron replacement, stable. 7. History of sick sinus syndrome, limiting treatment with beta blocker. 8. History of hypertension. 9. History of spinal stenosis. Plan:  She is having increasing palpitations and her device shows SVT. I suspect this is potentially AVNRT versus atrial flutter. Atrial tachycardia cannot be excluded. We talked about possible EP study with right sided ablation or further ablation. Procedure explained, risks, benefits, alternatives discussed. Unfortunately, she has a hard time getting time off work at this point. I talked about getting short term disability. I am going to see her back within a couple months, increase her Metoprolol to 25 mg twice daily with close monitoring, but again, EP study would be the best option. She is willing to proceed, but has a hard time getting any time away from work at this time.       Past Medical History:   Diagnosis Date    Arthritis     bl knees     Atrial fibrillation (HCC)     Essential hypertension        Current Outpatient Medications   Medication Sig Dispense Refill    apixaban (Eliquis) 5 mg tablet TAKE 1 TABLET BY MOUTH TWO TIMES A  Tab 3    metoprolol tartrate (LOPRESSOR) 25 mg tablet Take 1 Tab by mouth two (2) times a day. Or as directed 180 Tab 3    amLODIPine (Norvasc) 5 mg tablet Take 1 Tab by mouth daily. 90 Tab 3    cyanocobalamin (Vitamin B-12) 100 mcg tablet Take 100 mcg by mouth daily.  multivitamin (ONE A DAY) tablet Take 1 Tab by mouth daily.  omeprazole (PRILOSEC) 20 mg capsule Take 20 mg by mouth daily.  cyclobenzaprine (FLEXERIL) 10 mg tablet Take  by mouth three (3) times daily as needed for Muscle Spasm(s).  acetaminophen (TylenoL) 325 mg tablet Take  by mouth every four (4) hours as needed for Pain.  diclofenac EC (VOLTAREN) 75 mg EC tablet Take 75 mg by mouth two (2) times a day.  cholecalciferol, vitamin D3, (VITAMIN D3) 2,000 unit tab Take  by mouth daily.  meclizine (ANTIVERT) 25 mg tablet Take  by mouth three (3) times daily as needed. Social History   reports that she has never smoked. She has never used smokeless tobacco.   reports no history of alcohol use. Family History  family history includes Cancer in her father and mother; Diabetes in her mother; Heart Attack in her brother. Review of Systems  Except as stated above include:  Constitutional: Negative for fever, chills and malaise/fatigue. HEENT: No congestion or recent URI. Gastrointestinal: No nausea, vomiting, abdominal pain, bloody stools. Pulmonary:  Negative except as stated above. Cardiac:  Negative except as stated above. Musculoskeletal: Negative except as stated above. Neurological:  No localized symptoms. Skin:  Negative except as stated above. Psych:  Negative except as stated above. Endocrine:  Negative except as stated above.     PHYSICAL EXAM  BP Readings from Last 3 Encounters:   02/03/21 132/88   10/22/20 130/88   09/10/20 134/71     Pulse Readings from Last 3 Encounters:   02/03/21 (!) 55   10/22/20 67   09/10/20 75     Wt Readings from Last 3 Encounters:   02/03/21 107 kg (236 lb)   10/28/20 (P) 102.1 kg (225 lb)   10/22/20 108 kg (238 lb)     General:   Well developed, well groomed. Head/Neck:   No obvious jugular venous distention     No obvious carotid pulsations. No evidence of xanthelasma. Lungs:   No respiratory distress. Clear bilaterally. Heart:  Regular rate and rhythm. Normal S1/S2. Palpation grossly normal.    No significant murmurs, rubs or gallops. ILR intact. Abdomen:   Non-acute abdomen. No obvious pulsations. Extremities:   Intact peripheral pulses. No significant edema. Neurological:   Alert and oriented to person, place, time. No focal neurological deficit visually. Skin:   No obvious rash    Blood Pressure Metric:  Monitor recommended and adjustments stated if needed.

## 2021-04-07 ENCOUNTER — CLINICAL SUPPORT (OUTPATIENT)
Dept: CARDIOLOGY CLINIC | Age: 61
End: 2021-04-07

## 2021-04-07 ENCOUNTER — OFFICE VISIT (OUTPATIENT)
Dept: CARDIOLOGY CLINIC | Age: 61
End: 2021-04-07
Payer: COMMERCIAL

## 2021-04-07 VITALS
WEIGHT: 233 LBS | SYSTOLIC BLOOD PRESSURE: 130 MMHG | BODY MASS INDEX: 42.88 KG/M2 | OXYGEN SATURATION: 99 % | HEART RATE: 77 BPM | DIASTOLIC BLOOD PRESSURE: 84 MMHG | HEIGHT: 62 IN

## 2021-04-07 DIAGNOSIS — Z79.01 ON APIXABAN THERAPY: ICD-10-CM

## 2021-04-07 DIAGNOSIS — I47.1 SVT (SUPRAVENTRICULAR TACHYCARDIA) (HCC): Primary | ICD-10-CM

## 2021-04-07 DIAGNOSIS — Z87.01 HISTORY OF RECENT PNEUMONIA: ICD-10-CM

## 2021-04-07 DIAGNOSIS — Z95.9 CARDIAC DEVICE IN SITU: ICD-10-CM

## 2021-04-07 DIAGNOSIS — I10 ESSENTIAL HYPERTENSION: ICD-10-CM

## 2021-04-07 DIAGNOSIS — R00.2 PALPITATIONS: ICD-10-CM

## 2021-04-07 DIAGNOSIS — R00.2 PALPITATIONS: Primary | ICD-10-CM

## 2021-04-07 DIAGNOSIS — I48.91 NEW ONSET ATRIAL FIBRILLATION (HCC): ICD-10-CM

## 2021-04-07 DIAGNOSIS — I47.1 SVT (SUPRAVENTRICULAR TACHYCARDIA) (HCC): ICD-10-CM

## 2021-04-07 PROCEDURE — 99215 OFFICE O/P EST HI 40 MIN: CPT | Performed by: INTERNAL MEDICINE

## 2021-04-07 RX ORDER — SODIUM CHLORIDE 0.9 % (FLUSH) 0.9 %
5-40 SYRINGE (ML) INJECTION AS NEEDED
Status: CANCELLED | OUTPATIENT
Start: 2021-04-07

## 2021-04-07 RX ORDER — SODIUM CHLORIDE 0.9 % (FLUSH) 0.9 %
5-40 SYRINGE (ML) INJECTION EVERY 8 HOURS
Status: CANCELLED | OUTPATIENT
Start: 2021-04-07

## 2021-04-07 RX ORDER — AMLODIPINE BESYLATE 10 MG/1
10 TABLET ORAL DAILY
COMMUNITY
End: 2021-06-30

## 2021-04-07 NOTE — PROGRESS NOTES
Sally Favre presents today for   Chief Complaint   Patient presents with    Follow-up     2 month follow up        Sally Favre preferred language for health care discussion is english/other. Is someone accompanying this pt? no    Is the patient using any DME equipment during [address]? no    Depression Screening:  3 most recent PHQ Screens 4/7/2021   Little interest or pleasure in doing things Not at all   Feeling down, depressed, irritable, or hopeless Not at all   Total Score PHQ 2 0       Learning Assessment:  Learning Assessment 2/3/2021   PRIMARY LEARNER Patient   PRIMARY LANGUAGE ENGLISH   LEARNER PREFERENCE PRIMARY DEMONSTRATION   ANSWERED BY patient   RELATIONSHIP SELF       Abuse Screening:  Abuse Screening Questionnaire 4/7/2021   Do you ever feel afraid of your partner? N   Are you in a relationship with someone who physically or mentally threatens you? N   Is it safe for you to go home? Y       Fall Risk  Fall Risk Assessment, last 12 mths 2/3/2021   Able to walk? Yes   Fall in past 12 months? 0   Do you feel unsteady? 0   Are you worried about falling 0       Pt currently taking Anticoagulant therapy? Eliquis 5mg    Coordination of Care:  1. Have you been to the ER, urgent care clinic since your last visit? Hospitalized since your last visit? no    2. Have you seen or consulted any other health care providers outside of the [address] [place] [address] since your last visit? Include any pap smears or colon screening.  no

## 2021-04-07 NOTE — PROGRESS NOTES
History of Present Illness:  63year old female here for follow up. Last time I saw her was February. She was having tachycardia, SVT, palpitations up to 160 bpm by her monitor. She had been taking low dose Metoprolol, limited due to sinus bradycardia. Loop recorder was placed August, 2020. She had been hospitalized at [facility] with atrial fibrillation in the past and had the loop recorder placed in October. Today she continues to complain of some dyspnea with palpitations lasting 10-15 minutes, not quite as severe as before. She remains on anticoagulation. She is here to discuss options once again. We discussed possible EP study at last visit, however due to work constraints and getting time off, she wanted to defer. Impression:  1. Recurrent dizziness and palpitations. 2. History of paroxysmal atrial fibrillation, on Eliquis. 3. Pneumonia September, 2020 at [facility] with antibiotics. 4. SVT with heart rate up to 160s by subcutaneous loop recorder, initially placed October, 2020 in [facility]. 5. History of previous anemia and heavy menstrual bleeding, on iron replacement, stable. 6. History of sick sinus syndrome, limiting treatment with beta blocker. 7. Hypertension. 8. Spinal stenosis. Plan: At this point her loop recorder does not show events more than about 150 bpm, but she continues now with palpitations. Given her history of previous SVT, as well as atrial fibrillation and symptoms, she would like to proceed with possible EP study with possible right sided ablation. I discussed that at times we may not find any diagnostic arrhythmia. We also might find something that would require extra medication or more procedures, and I would also have a chance to measure HV interval given her history of sick sinus syndrome. She would like to make arrangements. We will plan to hold beta blocker 48 hours prior.       Past Medical History:   Diagnosis Date    Arthritis     bl knees     Atrial fibrillation [facility])     Essential hypertension        Current Outpatient Medications   Medication Sig Dispense Refill    amLODIPine (NORVASC) 10 mg tablet Take 10 mg by mouth daily.  apixaban (Eliquis) 5 mg tablet TAKE 1 TABLET BY MOUTH TWO TIMES A  Tab 3    metoprolol tartrate (LOPRESSOR) 25 mg tablet Take 1 Tab by mouth two (2) times a day. Or as directed 180 Tab 3    cyanocobalamin (Vitamin B-12) 100 mcg tablet Take 100 mcg by mouth daily.  multivitamin (ONE A DAY) tablet Take 1 Tab by mouth daily.  omeprazole (PRILOSEC) 20 mg capsule Take 20 mg by mouth daily.  cyclobenzaprine (FLEXERIL) 10 mg tablet Take  by mouth three (3) times daily as needed for Muscle Spasm(s).  acetaminophen (TylenoL) 325 mg tablet Take  by mouth every four (4) hours as needed for Pain.  diclofenac EC (VOLTAREN) 75 mg EC tablet Take 75 mg by mouth two (2) times a day.  cholecalciferol, vitamin D3, (VITAMIN D3) 2,000 unit tab Take  by mouth daily.  meclizine (ANTIVERT) 25 mg tablet Take  by mouth three (3) times daily as needed. Social History   reports that she has never smoked. She has never used smokeless tobacco.   reports no history of alcohol use. Family History  family history includes Cancer in her father and mother; Diabetes in her mother; Heart Attack in her brother. Review of Systems  Except as stated above include:  Constitutional: Negative for fever, chills and malaise/fatigue. HEENT: No congestion or recent URI. Gastrointestinal: No nausea, vomiting, abdominal pain, bloody stools. Pulmonary:  Negative except as stated above. Cardiac:  Negative except as stated above. Musculoskeletal: Negative except as stated above. Neurological:  No localized symptoms. Skin:  Negative except as stated above. Psych:  Negative except as stated above. Endocrine:  Negative except as stated above.     PHYSICAL EXAM  BP Readings from Last 3 Encounters:   04/07/21 130/84   02/03/21 132/88 10/22/20 130/88     Pulse Readings from Last 3 Encounters:   04/07/21 77   02/03/21 (!) 55   10/22/20 67     Wt Readings from Last 3 Encounters:   04/07/21 105.7 kg (233 lb)   02/03/21 107 kg (236 lb)   10/28/20 (P) 102.1 kg (225 lb)     General:   Well developed, well groomed. Head/Neck:   No obvious jugular venous distention     No obvious carotid pulsations. No evidence of xanthelasma. Lungs:   No respiratory distress. Clear bilaterally. Heart:  Regular rate and rhythm. Normal S1/S2. Palpation grossly normal.    No significant murmurs, rubs or gallops. Abdomen:   Non-acute abdomen. No obvious pulsations. Extremities:   Intact peripheral pulses. No significant edema. Neurological:   Alert and oriented to person, place, time. No focal neurological deficit visually. Skin:   No obvious rash    Blood Pressure Metric:  Monitor recommended and adjustments stated if needed.

## 2021-04-07 NOTE — PATIENT INSTRUCTIONS
[facility] Patient  EP Instructions 1. You are scheduled to have a EP Study with Possible Ablation on  April 26, 2021 , at 09:15. Please check in at 08:15. 
 
2. Please go to [facility] and park in the outpatient parking lot that is located around to the back of the hospital and enter through the [facility] building. Once you enter through the [facility] check in with the  there. The  will either give you directions or assist you in getting to the cath holding area. 3.  You are not to eat or drink anything after midnight the night before your                   procedure. 4. Please continue to take your medications with a small sip of water on the morning of the procedure with the following exceptions:  Do not take  Metoprolol (Lopressor) 48 hours prior. 5. If you are diabetic, do not take your insulin/sugar pill the morning of the procedure. 6. We encourage families to wait in the waiting room on the first floor while the procedure is being done. The Doctor will come out and talk with you as soon as the procedure is over. 7. There is the possibility that you may spend the night in the hospital, depending on the results of the procedure. This will be determined after the procedure is done. 8.   If you or your family have any questions, please call our office Monday-Friday 9:00am  
      -4:30 pm , at [phone], and ask to speak to one of the nurses.

## 2021-04-13 ENCOUNTER — HOSPITAL ENCOUNTER (OUTPATIENT)
Dept: GENERAL RADIOLOGY | Age: 61
Discharge: HOME | End: 2021-04-13
Payer: COMMERCIAL

## 2021-04-13 ENCOUNTER — HOSPITAL ENCOUNTER (OUTPATIENT)
Dept: PREADMISSION TESTING | Age: 61
Discharge: HOME | End: 2021-04-13
Payer: COMMERCIAL

## 2021-04-13 DIAGNOSIS — I47.1 SVT (SUPRAVENTRICULAR TACHYCARDIA) (HCC): ICD-10-CM

## 2021-04-13 LAB
ALBUMIN SERPL-MCNC: 3.6 G/DL (ref 3.4–5)
ALBUMIN/GLOB SERPL: 0.9 {RATIO} (ref 0.8–1.7)
ALP SERPL-CCNC: 88 U/L (ref 45–117)
ALT SERPL-CCNC: 24 U/L (ref 13–56)
ANION GAP SERPL CALC-SCNC: 3 MMOL/L (ref 3–18)
AST SERPL-CCNC: 23 U/L (ref 10–38)
BASOPHILS # BLD: 0 K/UL (ref 0–0.1)
BASOPHILS NFR BLD: 1 % (ref 0–2)
BILIRUB SERPL-MCNC: 1 MG/DL (ref 0.2–1)
BUN SERPL-MCNC: 13 MG/DL (ref 7–18)
BUN/CREAT SERPL: 18 (ref 12–20)
CALCIUM SERPL-MCNC: 8.8 MG/DL (ref 8.5–10.1)
CHLORIDE SERPL-SCNC: 111 MMOL/L (ref 100–111)
CO2 SERPL-SCNC: 26 MMOL/L (ref 21–32)
CREAT SERPL-MCNC: 0.72 MG/DL (ref 0.6–1.3)
DIFFERENTIAL METHOD BLD: ABNORMAL
EOSINOPHIL # BLD: 0.3 K/UL (ref 0–0.4)
EOSINOPHIL NFR BLD: 10 % (ref 0–5)
ERYTHROCYTE [DISTWIDTH] IN BLOOD BY AUTOMATED COUNT: 18.8 % (ref 11.6–14.5)
GLOBULIN SER CALC-MCNC: 3.9 G/DL (ref 2–4)
GLUCOSE SERPL-MCNC: 79 MG/DL (ref 74–99)
HCT VFR BLD AUTO: 28.1 % (ref 35–45)
HGB BLD-MCNC: 7.8 G/DL (ref 12–16)
INR PPP: 1.1 (ref 0.8–1.2)
LYMPHOCYTES # BLD: 1.3 K/UL (ref 0.9–3.6)
LYMPHOCYTES NFR BLD: 39 % (ref 21–52)
MCH RBC QN AUTO: 20.2 PG (ref 24–34)
MCHC RBC AUTO-ENTMCNC: 27.8 G/DL (ref 31–37)
MCV RBC AUTO: 72.6 FL (ref 74–97)
MONOCYTES # BLD: 0.2 K/UL (ref 0.05–1.2)
MONOCYTES NFR BLD: 7 % (ref 3–10)
NEUTS SEG # BLD: 1.6 K/UL (ref 1.8–8)
NEUTS SEG NFR BLD: 43 % (ref 40–73)
PLATELET # BLD AUTO: 303 K/UL (ref 135–420)
PLATELET COMMENTS,PCOM: ABNORMAL
PMV BLD AUTO: 9.7 FL (ref 9.2–11.8)
POTASSIUM SERPL-SCNC: 4.4 MMOL/L (ref 3.5–5.5)
PROT SERPL-MCNC: 7.5 G/DL (ref 6.4–8.2)
PROTHROMBIN TIME: 13.8 SEC (ref 11.5–15.2)
RBC # BLD AUTO: 3.87 M/UL (ref 4.2–5.3)
RBC MORPH BLD: ABNORMAL
SODIUM SERPL-SCNC: 140 MMOL/L (ref 136–145)
WBC # BLD AUTO: 3.4 K/UL (ref 4.6–13.2)

## 2021-04-13 PROCEDURE — 36415 COLL VENOUS BLD VENIPUNCTURE: CPT

## 2021-04-13 PROCEDURE — 80053 COMPREHEN METABOLIC PANEL: CPT

## 2021-04-13 PROCEDURE — 71046 X-RAY EXAM CHEST 2 VIEWS: CPT

## 2021-04-13 PROCEDURE — 85025 COMPLETE CBC W/AUTO DIFF WBC: CPT

## 2021-04-13 PROCEDURE — 85610 PROTHROMBIN TIME: CPT

## 2021-04-13 NOTE — PROGRESS NOTES
Ok for procedure ? EP study possible Ablation on 4-26-21, (History of previous anemia and heavy menstrual bleeding, on iron replacement, stable. )

## 2021-04-23 ENCOUNTER — TELEPHONE (OUTPATIENT)
Dept: CARDIOLOGY CLINIC | Age: 61
End: 2021-04-23

## 2021-04-23 NOTE — H&P
Based on notes from 4/23/21 from office, insurance has not yet approved procedure, will therefore reschedule. I will notify office. Addendum:  Patient was notified, procedure approved late in day on Friday, will proceed. Plan EP study, possible right sided ablation depending upon findings. If requires left sided access, will discus at a later date. History of Present Illness:  63year old female here for follow up. Last time I saw her was February. She was having tachycardia, SVT, palpitations up to 160 bpm by her monitor. She had been taking low dose Metoprolol, limited due to sinus bradycardia. Loop recorder was placed August, 2020. She had been hospitalized at [facility] with atrial fibrillation in the past and had the loop recorder placed in October. Today she continues to complain of some dyspnea with palpitations lasting 10-15 minutes, not quite as severe as before. She remains on anticoagulation. She is here to discuss options once again. We discussed possible EP study at last visit, however due to work constraints and getting time off, she wanted to defer.     Impression:  1. Recurrent dizziness and palpitations. 2. History of paroxysmal atrial fibrillation, on Eliquis. 3. Pneumonia September, 2020 at [facility] with antibiotics. 4. SVT with heart rate up to 160s by subcutaneous loop recorder, initially placed October, 2020 in [facility]. 5. History of previous anemia and heavy menstrual bleeding, on iron replacement, stable. 6. History of sick sinus syndrome, limiting treatment with beta blocker. 7. Hypertension. 8. Spinal stenosis.     Plan: At this point her loop recorder does not show events more than about 150 bpm, but she continues now with palpitations. Given her history of previous SVT, as well as atrial fibrillation and symptoms, she would like to proceed with possible EP study with possible right sided ablation. I discussed that at times we may not find any diagnostic arrhythmia.  We also might find something that would require extra medication or more procedures, and I would also have a chance to measure HV interval given her history of sick sinus syndrome. She would like to make arrangements. We will plan to hold beta blocker 48 hours prior.             Past Medical History:   Diagnosis Date    Arthritis       bl knees     Atrial fibrillation (HCC)      Essential hypertension                  Current Outpatient Medications   Medication Sig Dispense Refill    amLODIPine (NORVASC) 10 mg tablet Take 10 mg by mouth daily.        apixaban (Eliquis) 5 mg tablet TAKE 1 TABLET BY MOUTH TWO TIMES A  Tab 3    metoprolol tartrate (LOPRESSOR) 25 mg tablet Take 1 Tab by mouth two (2) times a day. Or as directed 180 Tab 3    cyanocobalamin (Vitamin B-12) 100 mcg tablet Take 100 mcg by mouth daily.        multivitamin (ONE A DAY) tablet Take 1 Tab by mouth daily.        omeprazole (PRILOSEC) 20 mg capsule Take 20 mg by mouth daily.        cyclobenzaprine (FLEXERIL) 10 mg tablet Take  by mouth three (3) times daily as needed for Muscle Spasm(s).        acetaminophen (TylenoL) 325 mg tablet Take  by mouth every four (4) hours as needed for Pain.        diclofenac EC (VOLTAREN) 75 mg EC tablet Take 75 mg by mouth two (2) times a day.        cholecalciferol, vitamin D3, (VITAMIN D3) 2,000 unit tab Take  by mouth daily.        meclizine (ANTIVERT) 25 mg tablet Take  by mouth three (3) times daily as needed.             Social History   reports that she has never smoked. She has never used smokeless tobacco.   reports no history of alcohol use.     Family History  family history includes Cancer in her father and mother; Diabetes in her mother; Heart Attack in her brother.     Review of Systems  Except as stated above include:  Constitutional: Negative for fever, chills and malaise/fatigue. HEENT: No congestion or recent URI.   Gastrointestinal: No nausea, vomiting, abdominal pain, bloody stools. Pulmonary:  Negative except as stated above. Cardiac:  Negative except as stated above. Musculoskeletal: Negative except as stated above. Neurological:  No localized symptoms. Skin:  Negative except as stated above. Psych:  Negative except as stated above. Endocrine:  Negative except as stated above.     PHYSICAL EXAM      BP Readings from Last 3 Encounters:   04/07/21 130/84   02/03/21 132/88   10/22/20 130/88          Pulse Readings from Last 3 Encounters:   04/07/21 77   02/03/21 (!) 55   10/22/20 67      Wt Readings from Last 3 Encounters:   04/07/21 105.7 kg (233 lb)   02/03/21 107 kg (236 lb)   10/28/20 (P) 102.1 kg (225 lb)      General:          Well developed, well groomed. Head/Neck:     No obvious jugular venous distention                           No obvious carotid pulsations. No evidence of xanthelasma. Lungs:             No respiratory distress. Clear bilaterally. Heart:              Regular rate and rhythm. Normal S1/S2. Palpation grossly normal.                          No significant murmurs, rubs or gallops. Abdomen:        Non-acute abdomen. No obvious pulsations. Extremities:     Intact peripheral pulses. No significant edema. Neurological:   Alert and oriented to person, place, time. No focal neurological deficit visually.   Skin:                No obvious rash     Blood Pressure Metric:  Monitor recommended and adjustments stated if needed.         Electronically signed by Jesenia Phan MD at 04/07/21 8432

## 2021-04-23 NOTE — TELEPHONE ENCOUNTER
Attempted to contact patient concerning prior authorization for her procedure scheduled on 4/26/21 with Dr. Aaron Parada. I have not yet received the approval from [facility]. I spoke with Huma Hale @ 12:19pm .. who stated that it was still under review. She connected me with the approval department and I spoke with Jose Luis Mac @ 12:46pm .. who stated that they have 15 business days to respond to an authorization request (today is day 14). She told me that there is no way to alter the response time. Call Reference # [id number]    I am trying to inform the patient of the situation, however, I am unable to reach her. Her phone just rings and there is no way to leave a voicemail. I will keep trying to reach her.

## 2021-04-26 ENCOUNTER — ANESTHESIA (OUTPATIENT)
Dept: CARDIAC CATH/INVASIVE PROCEDURES | Age: 61
End: 2021-04-26
Payer: COMMERCIAL

## 2021-04-26 ENCOUNTER — ANESTHESIA EVENT (OUTPATIENT)
Dept: CARDIAC CATH/INVASIVE PROCEDURES | Age: 61
End: 2021-04-26
Payer: COMMERCIAL

## 2021-04-26 ENCOUNTER — HOSPITAL ENCOUNTER (OUTPATIENT)
Age: 61
Setting detail: OUTPATIENT SURGERY
Discharge: HOME | End: 2021-04-26
Attending: INTERNAL MEDICINE | Admitting: INTERNAL MEDICINE
Payer: COMMERCIAL

## 2021-04-26 VITALS
HEART RATE: 76 BPM | DIASTOLIC BLOOD PRESSURE: 88 MMHG | OXYGEN SATURATION: 100 % | BODY MASS INDEX: 43.06 KG/M2 | WEIGHT: 234 LBS | RESPIRATION RATE: 22 BRPM | SYSTOLIC BLOOD PRESSURE: 147 MMHG | HEIGHT: 62 IN | TEMPERATURE: 98 F

## 2021-04-26 DIAGNOSIS — I48.91 NEW ONSET ATRIAL FIBRILLATION (HCC): Primary | ICD-10-CM

## 2021-04-26 DIAGNOSIS — I47.1 SVT (SUPRAVENTRICULAR TACHYCARDIA) (HCC): ICD-10-CM

## 2021-04-26 LAB
ATRIAL RATE: 69 BPM
CALCULATED P AXIS, ECG09: 75 DEGREES
CALCULATED R AXIS, ECG10: 25 DEGREES
CALCULATED T AXIS, ECG11: 53 DEGREES
DIAGNOSIS, 93000: NORMAL
HCG UR QL: NEGATIVE
P-R INTERVAL, ECG05: 178 MS
Q-T INTERVAL, ECG07: 400 MS
QRS DURATION, ECG06: 78 MS
QTC CALCULATION (BEZET), ECG08: 428 MS
VENTRICULAR RATE, ECG03: 69 BPM

## 2021-04-26 PROCEDURE — 93653 COMPRE EP EVAL TX SVT: CPT | Performed by: INTERNAL MEDICINE

## 2021-04-26 PROCEDURE — 93621 COMP EP EVL L PAC&REC C SINS: CPT | Performed by: INTERNAL MEDICINE

## 2021-04-26 PROCEDURE — 77030035291 HC TBNG PMP SMARTABLATE J&J -B: Performed by: INTERNAL MEDICINE

## 2021-04-26 PROCEDURE — 93005 ELECTROCARDIOGRAM TRACING: CPT

## 2021-04-26 PROCEDURE — 93620 COMP EP EVL R AT VEN PAC&REC: CPT | Performed by: INTERNAL MEDICINE

## 2021-04-26 PROCEDURE — 74011000250 HC RX REV CODE- 250: Performed by: INTERNAL MEDICINE

## 2021-04-26 PROCEDURE — 74011250636 HC RX REV CODE- 250/636: Performed by: ANESTHESIOLOGY

## 2021-04-26 PROCEDURE — 77030022017 HC DRSG HEMO QCLOT ZMED -A: Performed by: INTERNAL MEDICINE

## 2021-04-26 PROCEDURE — 93623 PRGRMD STIMJ&PACG IV RX NFS: CPT | Performed by: INTERNAL MEDICINE

## 2021-04-26 PROCEDURE — 00534 ANES INSERTION/RPLCMT CVDFB: CPT | Performed by: ANESTHESIOLOGY

## 2021-04-26 PROCEDURE — C1730 CATH, EP, 19 OR FEW ELECT: HCPCS | Performed by: INTERNAL MEDICINE

## 2021-04-26 PROCEDURE — C1894 INTRO/SHEATH, NON-LASER: HCPCS | Performed by: INTERNAL MEDICINE

## 2021-04-26 PROCEDURE — 74011250636 HC RX REV CODE- 250/636: Performed by: INTERNAL MEDICINE

## 2021-04-26 PROCEDURE — 74011000250 HC RX REV CODE- 250: Performed by: ANESTHESIOLOGY

## 2021-04-26 PROCEDURE — 76060000033 HC ANESTHESIA 1 TO 1.5 HR: Performed by: INTERNAL MEDICINE

## 2021-04-26 PROCEDURE — 77030018729 HC ELECTRD DEFIB PAD CARD -B: Performed by: INTERNAL MEDICINE

## 2021-04-26 PROCEDURE — 81025 URINE PREGNANCY TEST: CPT

## 2021-04-26 RX ORDER — MIDAZOLAM HYDROCHLORIDE 1 MG/ML
INJECTION, SOLUTION INTRAMUSCULAR; INTRAVENOUS AS NEEDED
Status: DISCONTINUED | OUTPATIENT
Start: 2021-04-26 | End: 2021-04-26 | Stop reason: HOSPADM

## 2021-04-26 RX ORDER — OXYCODONE AND ACETAMINOPHEN 5; 325 MG/1; MG/1
1 TABLET ORAL
Status: DISCONTINUED | OUTPATIENT
Start: 2021-04-26 | End: 2021-04-26 | Stop reason: HOSPADM

## 2021-04-26 RX ORDER — OXYCODONE AND ACETAMINOPHEN 5; 325 MG/1; MG/1
1 TABLET ORAL
Qty: 12 TAB | Refills: 0 | Status: SHIPPED | OUTPATIENT
Start: 2021-04-26 | End: 2021-04-29

## 2021-04-26 RX ORDER — FENTANYL CITRATE 50 UG/ML
INJECTION, SOLUTION INTRAMUSCULAR; INTRAVENOUS AS NEEDED
Status: DISCONTINUED | OUTPATIENT
Start: 2021-04-26 | End: 2021-04-26 | Stop reason: HOSPADM

## 2021-04-26 RX ORDER — SODIUM CHLORIDE 9 MG/ML
50 INJECTION, SOLUTION INTRAVENOUS CONTINUOUS
Status: DISCONTINUED | OUTPATIENT
Start: 2021-04-26 | End: 2021-04-26 | Stop reason: HOSPADM

## 2021-04-26 RX ORDER — METOPROLOL TARTRATE 25 MG/1
50 TABLET, FILM COATED ORAL 2 TIMES DAILY
Qty: 180 TAB | Refills: 3 | Status: SHIPPED | OUTPATIENT
Start: 2021-04-26 | End: 2021-06-30

## 2021-04-26 RX ORDER — LIDOCAINE HYDROCHLORIDE 20 MG/ML
INJECTION, SOLUTION EPIDURAL; INFILTRATION; INTRACAUDAL; PERINEURAL AS NEEDED
Status: DISCONTINUED | OUTPATIENT
Start: 2021-04-26 | End: 2021-04-26 | Stop reason: HOSPADM

## 2021-04-26 RX ORDER — ISOPROTERENOL HYDROCHLORIDE 0.2 MG/ML
INJECTION, SOLUTION INTRAMUSCULAR; INTRAVENOUS AS NEEDED
Status: DISCONTINUED | OUTPATIENT
Start: 2021-04-26 | End: 2021-04-26 | Stop reason: HOSPADM

## 2021-04-26 RX ORDER — LIDOCAINE HYDROCHLORIDE 10 MG/ML
INJECTION, SOLUTION EPIDURAL; INFILTRATION; INTRACAUDAL; PERINEURAL AS NEEDED
Status: DISCONTINUED | OUTPATIENT
Start: 2021-04-26 | End: 2021-04-26 | Stop reason: HOSPADM

## 2021-04-26 RX ORDER — PROPOFOL 10 MG/ML
VIAL (ML) INTRAVENOUS
Status: DISCONTINUED | OUTPATIENT
Start: 2021-04-26 | End: 2021-04-26 | Stop reason: HOSPADM

## 2021-04-26 RX ADMIN — PROPOFOL 50 MCG/KG/MIN: 10 INJECTION, EMULSION INTRAVENOUS at 08:54

## 2021-04-26 RX ADMIN — FENTANYL CITRATE 50 MCG: 50 INJECTION, SOLUTION INTRAMUSCULAR; INTRAVENOUS at 08:59

## 2021-04-26 RX ADMIN — LIDOCAINE HYDROCHLORIDE 50 MG: 20 INJECTION, SOLUTION EPIDURAL; INFILTRATION; INTRACAUDAL; PERINEURAL at 08:54

## 2021-04-26 RX ADMIN — MIDAZOLAM HYDROCHLORIDE 1 MG: 2 INJECTION, SOLUTION INTRAMUSCULAR; INTRAVENOUS at 09:07

## 2021-04-26 RX ADMIN — FENTANYL CITRATE 25 MCG: 50 INJECTION, SOLUTION INTRAMUSCULAR; INTRAVENOUS at 09:15

## 2021-04-26 RX ADMIN — FENTANYL CITRATE 25 MCG: 50 INJECTION, SOLUTION INTRAMUSCULAR; INTRAVENOUS at 08:55

## 2021-04-26 RX ADMIN — SODIUM CHLORIDE: 900 INJECTION, SOLUTION INTRAVENOUS at 08:46

## 2021-04-26 RX ADMIN — MIDAZOLAM HYDROCHLORIDE 1 MG: 2 INJECTION, SOLUTION INTRAMUSCULAR; INTRAVENOUS at 08:53

## 2021-04-26 NOTE — Clinical Note
Bilateral groin prepped with ChloraPrep and draped. Wet prep solution applied at: 858. Wet prep solution dried at: 901. Wet prep elapsed drying time: 43 mins.

## 2021-04-26 NOTE — Clinical Note
Anesthesia present. Anesthesia will monitor and maintain: airway, IV access, sedation, medications, and vital signs. Refer to Anesthesia report for further documentation.

## 2021-04-26 NOTE — Clinical Note
TRANSFER - IN REPORT:     Verbal report received from: HARJIT Arellano RN. Report consisted of patient's Situation, Background, Assessment and   Recommendations(SBAR). Opportunity for questions and clarification was provided. Assessment completed upon patient's arrival to unit and care assumed. Patient transported with a Registered Nurse.

## 2021-04-26 NOTE — Clinical Note
TRANSFER - OUT REPORT:     Verbal report given to: GLORIA Padilla. Report consisted of patient's Situation, Background, Assessment and   Recommendations(SBAR). Opportunity for questions and clarification was provided. Patient transported with a Registered Nurse. Patient transported to: holding area.

## 2021-04-26 NOTE — ANESTHESIA PREPROCEDURE EVALUATION
Relevant Problems   No relevant active problems       Anesthetic History   No history of anesthetic complications            Review of Systems / Medical History  Patient summary reviewed, nursing notes reviewed and pertinent labs reviewed    Pulmonary          Shortness of breath         Neuro/Psych   Within defined limits           Cardiovascular    Hypertension        Dysrhythmias : SVT and atrial fibrillation        Comments: 09/2020 ECHO  ESTIMATED EJECTION FRACTION OF 55% BY VISUAL ASSESSMENT.    GI/Hepatic/Renal  Within defined limits              Endo/Other        Morbid obesity and anemia     Other Findings            Physical Exam    Airway  Mallampati: III  TM Distance: 4 - 6 cm  Neck ROM: normal range of motion   Mouth opening: Normal     Cardiovascular    Rhythm: regular           Dental    Dentition: Lower dentition intact and Upper dentition intact     Pulmonary  Breath sounds clear to auscultation               Abdominal  GI exam deferred       Other Findings            Anesthetic Plan    ASA: 3  Anesthesia type: MAC            Anesthetic plan and risks discussed with: Patient

## 2021-04-26 NOTE — ANESTHESIA POSTPROCEDURE EVALUATION
Procedure(s):  EP STUDY COMPLETE  Lt Atrial Pace & Record During Ep Study  Drug Stimulation. MAC    Anesthesia Post Evaluation      Multimodal analgesia: multimodal analgesia used between 6 hours prior to anesthesia start to PACU discharge  Patient location during evaluation: PACU  Patient participation: complete - patient participated  Level of consciousness: awake and alert  Pain management: adequate  Airway patency: patent  Anesthetic complications: no  Cardiovascular status: acceptable and hemodynamically stable  Respiratory status: acceptable  Hydration status: acceptable  Post anesthesia nausea and vomiting:  controlled      INITIAL Post-op Vital signs:   Vitals Value Taken Time   /83 04/26/21 1051   Temp     Pulse 72 04/26/21 1053   Resp 16 04/26/21 1053   SpO2 100 % 04/26/21 1053   Vitals shown include unvalidated device data.

## 2021-04-26 NOTE — ROUTINE PROCESS
4/26/2021 RE: Moises Estevez To Whom it May Concern: This is to certify that Moises Estevez may return to work for full duty: May 3, 2021. Please feel free to contact my office if you have any questions or concerns. Thank you for your assistance in this matter. Sincerely, Reggie Olson MD @ [phone] Aline Mullen RN @ [phone]

## 2021-04-26 NOTE — DISCHARGE INSTRUCTIONS
Patient Education        Electrophysiology Study and Catheter Ablation: What to Expect at [address] [place] [address] Drive had an electrophysiology study for a problem with your heartbeat. You may also have had a catheter ablation to try to correct the problem. You may have swelling, bruising, or a small lump around the site where the catheters went into your body. These should go away in 3 to 4 weeks. Do not exercise hard or lift anything heavy for a week. You may be able to go back to work and to your normal routine in 1 or 2 days. This care sheet gives you a general idea about how long it will take for you to recover. But each person recovers at a different pace. Follow the steps below to get better as quickly as possible. How can you care for yourself at home? Activity    · For 1 week, do not lift anything that would make you strain. This may include heavy grocery bags and milk containers, a heavy briefcase or backpack, cat litter or dog food bags, a vacuum , or a child.     · For 1 week, do not exercise hard or do any activity that could strain your blood vessels or the site where the catheters went into your body.     · Ask your doctor when it is okay to have sex. Diet    · You can eat your normal diet. If your stomach is upset, try bland, low-fat foods like plain rice, broiled chicken, toast, and yogurt.     · Drink plenty of fluids (unless your doctor tells you not to). Medicines    · Your doctor will tell you if and when you can restart your medicines. He or she will also give you instructions about taking any new medicines.     · If you take aspirin or some other blood thinner, ask your doctor if and when to start taking it again. Make sure that you understand exactly what your doctor wants you to do.     · Ask your doctor if you can take acetaminophen (Tylenol) for pain.  Do not take aspirin for 3 days, unless your doctor says it is okay.     · Check with your doctor before you take aspirin or anti-inflammatory medicines to reduce pain and swelling. These include ibuprofen (Advil, Motrin) and naproxen (Aleve).   · Make sure you know which heart medicines to continue and which ones to stop. Ask your doctor if you aren't sure. Catheter site care    · You can remove your bandages the day after the procedure.     · You may shower 24 to 48 hours after the procedure, if your doctor okays it. Pat the incision dry.     · Do not soak the catheter site until it is healed. Don't take a bath for 1 week, or until your doctor tells you it is okay.     · Watch for bleeding from the site. A small amount of blood (up to the size of a quarter) on the bandage can be normal.     · If you are bleeding, lie down and press on the area for 15 minutes to try to make it stop. If the bleeding does not stop, call your doctor or seek immediate medical care. Follow-up care is a key part of your treatment and safety. Be sure to make and go to all appointments, and call your doctor if you are having problems. It's also a good idea to know your test results and keep a list of the medicines you take. When should you call for help? Call  911 anytime you think you may need emergency care. For example, call if:    · You passed out (lost consciousness).     · You have symptoms of a heart attack. These may include:  ? Chest pain or pressure, or a strange feeling in the chest.  ? Sweating. ? Shortness of breath. ? Nausea or vomiting. ? Pain, pressure, or a strange feeling in the back, neck, jaw, or upper belly, or in one or both shoulders or arms. ? Lightheadedness or sudden weakness. ? A fast or irregular heartbeat. After you call 911, the  may tell you to chew 1 adult-strength or 2 to 4 low-dose aspirin. Wait for an ambulance. Do not try to drive yourself.     · You have symptoms of a stroke.  These may include:  ? Sudden numbness, tingling, weakness, or loss of movement in your face, arm, or leg, especially on only one side of your body. ? Sudden vision changes. ? Sudden trouble speaking. ? Sudden confusion or trouble understanding simple statements. ? Sudden problems with walking or balance. ? A sudden, severe headache that is different from past headaches. Call your doctor now or seek immediate medical care if:    · You are bleeding from the area where the catheter was put in your blood vessel.     · You have a fast-growing, painful lump at the catheter site.     · You have signs of infection, such as:  ? Increased pain, swelling, warmth, or redness. ? Red streaks leading from the catheter site. ? Pus draining from the catheter site. ? A fever.     · Your leg, arm, or hand is painful, looks blue, or feels cold, numb, or tingly. Watch closely for any changes in your health, and be sure to contact your doctor if you have any problems. Where can you learn more? Go to http://www.gray.com/  Enter H580 in the search box to learn more about \"Electrophysiology Study and Catheter Ablation: What to Expect at Home. \"  Current as of: August 31, 2020               Content Version: 12.8  © 8186-8569 [employer]. Care instructions adapted under license by [employer] (which disclaims liability or warranty for this information). If you have questions about a medical condition or this instruction, always ask your healthcare professional. [place] [address] any warranty or liability for your use of this information. DISCHARGE SUMMARY from Nurse    PATIENT INSTRUCTIONS:    After general anesthesia or intravenous sedation, for 24 hours or while taking prescription Narcotics:  · Limit your activities  · Do not drive and operate hazardous machinery  · Do not make important personal or business decisions  · Do  not drink alcoholic beverages  · If you have not urinated within 8 hours after discharge, please contact your surgeon on call.     Report the following to your surgeon:  · Excessive pain, swelling, redness or odor of or around the surgical area  · Temperature over 100.5  · Nausea and vomiting lasting longer than 4 hours or if unable to take medications  · Any signs of decreased circulation or nerve impairment to extremity: change in color, persistent  numbness, tingling, coldness or increase pain  · Any questions    What to do at Home:  Recommended activity: No lifting, Driving, or Strenuous exercise for 24 hours. If you experience any of the following symptoms bleeding,swelling,acute pain or numbness, fever, please follow up with Dr. Loren Bonner MD @ [phone]. .    *  Please give a list of your current medications to your Primary Care Provider. *  Please update this list whenever your medications are discontinued, doses are      changed, or new medications (including over-the-counter products) are added. *  Please carry medication information at all times in case of emergency situations. These are general instructions for a healthy lifestyle:    No smoking/ No tobacco products/ Avoid exposure to second hand smoke  Surgeon General's Warning:  Quitting smoking now greatly reduces serious risk to your health. Obesity, smoking, and sedentary lifestyle greatly increases your risk for illness    A healthy diet, regular physical exercise & weight monitoring are important for maintaining a healthy lifestyle    You may be retaining fluid if you have a history of heart failure or if you experience any of the following symptoms:  Weight gain of 3 pounds or more overnight or 5 pounds in a week, increased swelling in our hands or feet or shortness of breath while lying flat in bed. Please call your doctor as soon as you notice any of these symptoms; do not wait until your next office visit. The discharge information has been reviewed with the patient. The patient verbalized understanding.   Discharge medications reviewed with the patient and appropriate educational materials and side effects teaching were provided. ___________________________________________________________________________________________________________________________________    No driving x 24 hours. Return to work full duty on May 3rd, 2021. Patient armband removed and shredded  MyChart Activation    Thank you for requesting access to [employer]. Please follow the instructions below to securely access and download your online medical record. [employer] allows you to send messages to your doctor, view your test results, renew your prescriptions, schedule appointments, and more. How Do I Sign Up? 1. In your internet browser, go to [email or web address]. 2. Click on the First Time User? Click Here link in the Sign In box. You will see the New Member Sign Up page. 3. Enter your [employer] Access Code exactly as it appears below. You will not need to use this code after youve completed the sign-up process. If you do not sign up before the expiration date, you must request a new code. [employer] Access Code: [email or web address]  Expires: 2021  8:02 AM (This is the date your [employer] access code will )    4. Enter the last four digits of your Social Security Number (xxxx) and Date of Birth (mm/dd/yyyy) as indicated and click Submit. You will be taken to the next sign-up page. 5. Create a [employer] ID. This will be your [employer] login ID and cannot be changed, so think of one that is secure and easy to remember. 6. Create a [employer] password. You can change your password at any time. 7. Enter your Password Reset Question and Answer. This can be used at a later time if you forget your password. 8. Enter your e-mail address. You will receive e-mail notification when new information is available in [address] 9. Click Sign Up. You can now view and download portions of your medical record.   10. Click the Download Summary menu link to download a portable copy of your medical information. Additional Information    If you have questions, please visit the Frequently Asked Questions section of the [employer] website at [email or web address] Remember, [employer] is NOT to be used for urgent needs. For medical emergencies, dial 911.

## 2021-04-26 NOTE — ROUTINE PROCESS
A+Ox4, ambulatory without difficulty, denied any complaints. Right groin dressing intact, no bleeding or swelling. Discharge information reviewed with the patient. Escorted to car, tot her son for transport home. AVS Discharge instructions reviewed with patient and copy given to patient. All questions answered. Patient verbalized understanding to all discharge instructions. PIV removed. Procedural site within normal limits. No hematoma or bleeding noted from procedural and PIV site. No pain noted at discharge. Patient discharged with support person in stable condition. Escorted out to vehicle for transport home.

## 2021-04-27 ENCOUNTER — TELEPHONE (OUTPATIENT)
Dept: CARDIOLOGY CLINIC | Age: 61
End: 2021-04-27

## 2021-05-05 NOTE — PROGRESS NOTES
I have personally seen and evaluated the device findings. Interrogation reviewed and I agree with assessment.     Charmaine Whitlock

## 2021-05-14 ENCOUNTER — HOSPITAL ENCOUNTER (EMERGENCY)
Age: 61
Discharge: HOME | End: 2021-05-14
Attending: EMERGENCY MEDICINE
Payer: COMMERCIAL

## 2021-05-14 VITALS
HEART RATE: 84 BPM | WEIGHT: 235 LBS | RESPIRATION RATE: 16 BRPM | OXYGEN SATURATION: 100 % | BODY MASS INDEX: 42.98 KG/M2 | DIASTOLIC BLOOD PRESSURE: 58 MMHG | SYSTOLIC BLOOD PRESSURE: 111 MMHG | TEMPERATURE: 98.9 F

## 2021-05-14 DIAGNOSIS — I48.92 ATRIAL FLUTTER BY ELECTROCARDIOGRAM (HCC): ICD-10-CM

## 2021-05-14 DIAGNOSIS — I47.1 PAROXYSMAL SVT (SUPRAVENTRICULAR TACHYCARDIA) (HCC): ICD-10-CM

## 2021-05-14 DIAGNOSIS — R00.0 TACHYCARDIA: Primary | ICD-10-CM

## 2021-05-14 DIAGNOSIS — D64.9 ANEMIA, UNSPECIFIED TYPE: ICD-10-CM

## 2021-05-14 LAB
ALBUMIN SERPL-MCNC: 3.3 G/DL (ref 3.4–5)
ALBUMIN/GLOB SERPL: 0.8 {RATIO} (ref 0.8–1.7)
ALP SERPL-CCNC: 85 U/L (ref 45–117)
ALT SERPL-CCNC: 26 U/L (ref 13–56)
ANION GAP SERPL CALC-SCNC: 11 MMOL/L (ref 3–18)
AST SERPL-CCNC: 34 U/L (ref 10–38)
BASOPHILS # BLD: 0 K/UL (ref 0–0.1)
BASOPHILS NFR BLD: 1 % (ref 0–2)
BILIRUB SERPL-MCNC: 0.4 MG/DL (ref 0.2–1)
BUN SERPL-MCNC: 22 MG/DL (ref 7–18)
BUN/CREAT SERPL: 22 (ref 12–20)
CALCIUM SERPL-MCNC: 8.5 MG/DL (ref 8.5–10.1)
CHLORIDE SERPL-SCNC: 108 MMOL/L (ref 100–111)
CO2 SERPL-SCNC: 22 MMOL/L (ref 21–32)
CREAT SERPL-MCNC: 1.02 MG/DL (ref 0.6–1.3)
DIFFERENTIAL METHOD BLD: ABNORMAL
EOSINOPHIL # BLD: 0.3 K/UL (ref 0–0.4)
EOSINOPHIL NFR BLD: 6 % (ref 0–5)
ERYTHROCYTE [DISTWIDTH] IN BLOOD BY AUTOMATED COUNT: 18.4 % (ref 11.6–14.5)
GLOBULIN SER CALC-MCNC: 4 G/DL (ref 2–4)
GLUCOSE SERPL-MCNC: 82 MG/DL (ref 74–99)
HCT VFR BLD AUTO: 25.3 % (ref 35–45)
HGB BLD-MCNC: 7.5 G/DL (ref 12–16)
LYMPHOCYTES # BLD: 1.7 K/UL (ref 0.9–3.6)
LYMPHOCYTES NFR BLD: 37 % (ref 21–52)
MCH RBC QN AUTO: 20.9 PG (ref 24–34)
MCHC RBC AUTO-ENTMCNC: 29.6 G/DL (ref 31–37)
MCV RBC AUTO: 70.5 FL (ref 74–97)
MONOCYTES # BLD: 0.3 K/UL (ref 0.05–1.2)
MONOCYTES NFR BLD: 6 % (ref 3–10)
NEUTS SEG # BLD: 2.3 K/UL (ref 1.8–8)
NEUTS SEG NFR BLD: 50 % (ref 40–73)
PLATELET # BLD AUTO: 263 K/UL (ref 135–420)
PMV BLD AUTO: 10.1 FL (ref 9.2–11.8)
POTASSIUM SERPL-SCNC: 3.6 MMOL/L (ref 3.5–5.5)
PROT SERPL-MCNC: 7.3 G/DL (ref 6.4–8.2)
RBC # BLD AUTO: 3.59 M/UL (ref 4.2–5.3)
SODIUM SERPL-SCNC: 141 MMOL/L (ref 136–145)
TROPONIN I SERPL-MCNC: 0.02 NG/ML (ref 0–0.04)
WBC # BLD AUTO: 4.5 K/UL (ref 4.6–13.2)

## 2021-05-14 PROCEDURE — 85025 COMPLETE CBC W/AUTO DIFF WBC: CPT

## 2021-05-14 PROCEDURE — 80053 COMPREHEN METABOLIC PANEL: CPT

## 2021-05-14 PROCEDURE — 93005 ELECTROCARDIOGRAM TRACING: CPT

## 2021-05-14 PROCEDURE — 84484 ASSAY OF TROPONIN QUANT: CPT

## 2021-05-14 PROCEDURE — 99284 EMERGENCY DEPT VISIT MOD MDM: CPT

## 2021-05-14 NOTE — ED TRIAGE NOTES
\"Around 3 pm today I was at work and started having palpitations. I went home and took my medications and it didn't go away. I was short of breath and my chest felt heavy. \" Per EMS: Upon arrival patient was noted to be in Afib with a rate in the  150's. Upon arrival to the ER patient was noted to have a heart rate in the 80's. Patient denies chest pain or shortness of breath.

## 2021-05-14 NOTE — ED PROVIDER NOTES
EMERGENCY DEPARTMENT HISTORY AND PHYSICAL EXAM    6:11 PM      Date: 5/14/2021  Patient Name: Mt Raines    History of Presenting Illness     Chief Complaint   Patient presents with    Rapid Heart Rate         History Provided By: Patient  Location/Duration/Severity/Modifying factors   The history is provided by the patient, medical records and the EMS personnel. Rapid Heart Rate  This is a recurrent problem. The current episode started 1 to 2 hours ago. The problem occurs constantly. The problem has been rapidly improving. Associated symptoms include chest pain and shortness of breath. Pertinent negatives include no abdominal pain and no headaches. Nothing aggravates the symptoms. The symptoms are relieved by position. She has tried nothing for the symptoms. The treatment provided significant relief. PCP: Jeremy Robbins MD    Current Outpatient Medications   Medication Sig Dispense Refill    metoprolol tartrate (LOPRESSOR) 25 mg tablet Take 2 Tabs by mouth two (2) times a day. Or as directed 180 Tab 3    amLODIPine (NORVASC) 10 mg tablet Take 10 mg by mouth daily.  apixaban (Eliquis) 5 mg tablet TAKE 1 TABLET BY MOUTH TWO TIMES A  Tab 3    cyanocobalamin (Vitamin B-12) 100 mcg tablet Take 100 mcg by mouth daily.  multivitamin (ONE A DAY) tablet Take 1 Tab by mouth daily.  omeprazole (PRILOSEC) 20 mg capsule Take 20 mg by mouth daily.  cyclobenzaprine (FLEXERIL) 10 mg tablet Take  by mouth three (3) times daily as needed for Muscle Spasm(s).  acetaminophen (TylenoL) 325 mg tablet Take  by mouth every four (4) hours as needed for Pain.  diclofenac EC (VOLTAREN) 75 mg EC tablet Take 75 mg by mouth two (2) times a day.  cholecalciferol, vitamin D3, (VITAMIN D3) 2,000 unit tab Take  by mouth daily.  meclizine (ANTIVERT) 25 mg tablet Take  by mouth three (3) times daily as needed.          Past History     Past Medical History:  Past Medical History: Diagnosis Date    Arthritis     bl knees     Atrial fibrillation (HCC)     Essential hypertension        Past Surgical History:  Past Surgical History:   Procedure Laterality Date    SC COMPRE ELECTROPHYSIOL XM W/LEFT ATRIAL PACNG/REC N/A 4/26/2021    Lt Atrial Pace & Record During Ep Study performed by Willow Hill MD at [facility]   Ledy Castillo 1945 ELECTROPHYSIOLOGIC ARRHYTHMIA INDUCTION N/A 4/26/2021    EP STUDY COMPLETE performed by Willow Hill MD at [facility]    SC INSERTION [address]/[facility] N/A 10/28/2020    LOOP RECORDER INSERT performed by Willow Hill MD at [facility]    STIM/PACING HEART POST IV DRUG INFU N/A 4/26/2021    Drug Stimulation performed by Willow Hill MD at [facility]       Family History:  Family History   Problem Relation Age of Onset    Cancer Mother     Diabetes Mother     Cancer Father     Heart Attack Brother        Social History:  Social History     Tobacco Use    Smoking status: Never Smoker    Smokeless tobacco: Never Used   Substance Use Topics    Alcohol use: No    Drug use: No       Allergies:  No Known Allergies      Review of Systems       Review of Systems   Constitutional: Negative for chills, diaphoresis and fever. HENT: Negative for sore throat, trouble swallowing and voice change. Eyes: Negative for photophobia and visual disturbance. Respiratory: Positive for chest tightness and shortness of breath. Negative for cough. Cardiovascular: Positive for chest pain. Negative for palpitations and leg swelling. Gastrointestinal: Negative for abdominal pain, blood in stool, diarrhea, nausea and vomiting. Genitourinary: Negative for dysuria, frequency and hematuria. Musculoskeletal: Negative for joint swelling and myalgias. Skin: Negative for color change and rash. Neurological: Negative for dizziness, syncope, weakness, light-headedness and headaches.    Hematological: Negative for adenopathy. Does not bruise/bleed easily. Psychiatric/Behavioral: Negative for confusion and decreased concentration. Physical Exam     Visit Vitals  BP (!) 111/58 (BP 1 Location: Left upper arm, BP Patient Position: At rest)   Pulse 84   Temp 98.9 °F (37.2 °C)   Resp 16   Wt 106.6 kg (235 lb)   SpO2 100%   BMI 42.98 kg/m²         Physical Exam  Vitals signs and nursing note reviewed. Constitutional:       General: She is not in acute distress. Appearance: She is not ill-appearing. HENT:      Head: Normocephalic and atraumatic. Mouth/Throat:      Mouth: Mucous membranes are moist.      Pharynx: Oropharynx is clear. No oropharyngeal exudate or posterior oropharyngeal erythema. Eyes:      General: No scleral icterus. Extraocular Movements: Extraocular movements intact. Conjunctiva/sclera: Conjunctivae normal.   Neck:      Musculoskeletal: Normal range of motion. No neck rigidity. Cardiovascular:      Rate and Rhythm: Normal rate and regular rhythm. Pulses: Normal pulses. Heart sounds: Normal heart sounds. No murmur. No friction rub. No gallop. Pulmonary:      Effort: Pulmonary effort is normal. No respiratory distress. Breath sounds: Normal breath sounds. No wheezing. Abdominal:      General: Abdomen is flat. There is no distension. Palpations: Abdomen is soft. Tenderness: There is no abdominal tenderness. There is no guarding. Musculoskeletal:         General: No swelling or tenderness. Right lower leg: No edema. Left lower leg: No edema. Skin:     General: Skin is warm and dry. Coloration: Skin is not jaundiced. Neurological:      General: No focal deficit present. Mental Status: She is alert and oriented to person, place, and time.    Psychiatric:         Mood and Affect: Mood normal.         Behavior: Behavior normal.           Diagnostic Study Results     Labs -  Recent Results (from the past 12 hour(s))   EKG, 12 LEAD, INITIAL    Collection Time: 05/14/21  5:31 PM   Result Value Ref Range    Ventricular Rate 85 BPM    Atrial Rate 85 BPM    P-R Interval 184 ms    QRS Duration 80 ms    Q-T Interval 374 ms    QTC Calculation (Bezet) 445 ms    Calculated P Axis 62 degrees    Calculated R Axis 23 degrees    Calculated T Axis 43 degrees    Diagnosis       Normal sinus rhythm  Normal ECG  When compared with ECG of 26-APR-2021 08:19,  No significant change was found     CBC WITH AUTOMATED DIFF    Collection Time: 05/14/21  5:37 PM   Result Value Ref Range    WBC 4.5 (L) 4.6 - 13.2 K/uL    RBC 3.59 (L) 4.20 - 5.30 M/uL    HGB 7.5 (L) 12.0 - 16.0 g/dL    HCT 25.3 (L) 35.0 - 45.0 %    MCV 70.5 (L) 74.0 - 97.0 FL    MCH 20.9 (L) 24.0 - 34.0 PG    MCHC 29.6 (L) 31.0 - 37.0 g/dL    RDW 18.4 (H) 11.6 - 14.5 %    PLATELET 366 296 - 978 K/uL    MPV 10.1 9.2 - 11.8 FL    NEUTROPHILS 50 40 - 73 %    LYMPHOCYTES 37 21 - 52 %    MONOCYTES 6 3 - 10 %    EOSINOPHILS 6 (H) 0 - 5 %    BASOPHILS 1 0 - 2 %    ABS. NEUTROPHILS 2.3 1.8 - 8.0 K/UL    ABS. LYMPHOCYTES 1.7 0.9 - 3.6 K/UL    ABS. MONOCYTES 0.3 0.05 - 1.2 K/UL    ABS. EOSINOPHILS 0.3 0.0 - 0.4 K/UL    ABS. BASOPHILS 0.0 0.0 - 0.1 K/UL    DF AUTOMATED     METABOLIC PANEL, COMPREHENSIVE    Collection Time: 05/14/21  5:37 PM   Result Value Ref Range    Sodium 141 136 - 145 mmol/L    Potassium 3.6 3.5 - 5.5 mmol/L    Chloride 108 100 - 111 mmol/L    CO2 22 21 - 32 mmol/L    Anion gap 11 3.0 - 18 mmol/L    Glucose 82 74 - 99 mg/dL    BUN 22 (H) 7.0 - 18 MG/DL    Creatinine 1.02 0.6 - 1.3 MG/DL    BUN/Creatinine ratio 22 (H) 12 - 20      GFR est AA >60 >60 ml/min/1.73m2    GFR est non-AA 55 (L) >60 ml/min/1.73m2    Calcium 8.5 8.5 - 10.1 MG/DL    Bilirubin, total 0.4 0.2 - 1.0 MG/DL    ALT (SGPT) 26 13 - 56 U/L    AST (SGOT) 34 10 - 38 U/L    Alk.  phosphatase 85 45 - 117 U/L    Protein, total 7.3 6.4 - 8.2 g/dL    Albumin 3.3 (L) 3.4 - 5.0 g/dL    Globulin 4.0 2.0 - 4.0 g/dL    A-G Ratio 0.8 0.8 - 1.7     TROPONIN I    Collection Time: 05/14/21  5:37 PM   Result Value Ref Range    Troponin-I, QT 0.02 0.0 - 0.045 NG/ML       Radiologic Studies -   No orders to display         Medical Decision Making   I am the first provider for this patient. I reviewed the vital signs, available nursing notes, past medical history, past surgical history, family history and social history. Vital Signs-Reviewed the patient's vital signs. EKG: Normal sinus rhythm at rate of 85 normal axis without evidence of ischemia or infarction. Records Reviewed: Old Medical Records (Time of Review: 6:11 PM)    ED Course: Progress Notes, Reevaluation, and Consults:         Provider Notes (Medical Decision Making):   MDM  Number of Diagnoses or Management Options  Anemia, unspecified type  Atrial flutter by electrocardiogram ([facility])  Paroxysmal SVT (supraventricular tachycardia) ([facility])  Tachycardia  Diagnosis management comments:  16-year-old female with history of atrial fibrillation on Eliquis who presents for 2 hours of fast heart rate/palpitations that felt different than her previous atrial fibrillation episodes with associated dyspnea and chest tightness. Patient activated EMS subsequently at which point when she was moving over the stretcher coughed and resolved her palpitations. EKG performed by EMS showed a regular narrow complex tachycardia at a rate of 150 consistent with likely PSVT versus atrial flutter with 2 1 conduction block. Patient asymptomatic at time of ER evaluation. She is well appearing and hemodynamically stable.  Initial differential diagnosis considerations in this patient to include but no limited to benign etiologies of palpitations including premature ventricular contractions (PVCs), premature atrial contractions (PACs) and premature junctional contractions (PJCs), more significant dysrhythmias including long QT syndrome, Pratik-Parkinson-White syndrome, and Brugada syndrome among others, atrioventricular blocks, acute coronary syndromes, anxiety disorders, thyroid disorders, electrolyte imbalances, and palpitations secondary to drugs and medications among others. A 12-lead EKG was obtained no evidence of significant dysrhythmia, ectopy or accessory pathways and no evidence of acute ischemia or infarction as noted above. Labs were obtained in the evaluation of this patient to include CBC, CMP, and troponin, which were noted to be significant for hemoglobin of 7.5 with a microcytic consistent MCV and on review of previous laboratory evaluations stable at baseline. No evidence of electrolyte abnormalities and troponin negative. As such patient safe for discharge home with instructions given to follow-up with her cardiologist for further evaluation as well as to follow-up with her primary care provider regarding noted microcytic anemia. Patient agreed with plan of care expressed understanding of return precautions. Procedures    Critical Care Time: NA      Diagnosis     Clinical Impression:   1. Tachycardia    2. Anemia, unspecified type    3. Paroxysmal SVT (supraventricular tachycardia) ([facility] [address]    4. Atrial flutter by electrocardiogram [facility])        Disposition: Discharged    Follow-up Information     Follow up With Specialties Details Why Contact Info    Thomas Mcnally MD Cardiology, Internal Medicine Schedule an appointment as soon as possible for a visit   [address]      Brenda Cid MD Internal Medicine Schedule an appointment as soon as possible for a visit   [address] [place] [address] 108             Patient's Medications   Start Taking    No medications on file   Continue Taking    ACETAMINOPHEN (TYLENOL) 325 MG TABLET    Take  by mouth every four (4) hours as needed for Pain. AMLODIPINE (NORVASC) 10 MG TABLET    Take 10 mg by mouth daily.     APIXABAN (ELIQUIS) 5 MG TABLET    TAKE 1 TABLET BY MOUTH TWO TIMES A DAY    CHOLECALCIFEROL, VITAMIN D3, (VITAMIN D3) 2,000 UNIT TAB    Take  by mouth daily. CYANOCOBALAMIN (VITAMIN B-12) 100 MCG TABLET    Take 100 mcg by mouth daily. CYCLOBENZAPRINE (FLEXERIL) 10 MG TABLET    Take  by mouth three (3) times daily as needed for Muscle Spasm(s). DICLOFENAC EC (VOLTAREN) 75 MG EC TABLET    Take 75 mg by mouth two (2) times a day. MECLIZINE (ANTIVERT) 25 MG TABLET    Take  by mouth three (3) times daily as needed. METOPROLOL TARTRATE (LOPRESSOR) 25 MG TABLET    Take 2 Tabs by mouth two (2) times a day. Or as directed    MULTIVITAMIN (ONE A DAY) TABLET    Take 1 Tab by mouth daily. OMEPRAZOLE (PRILOSEC) 20 MG CAPSULE    Take 20 mg by mouth daily. These Medications have changed    No medications on file   Stop Taking    No medications on file     Disclaimer: Sections of this note are dictated using utilizing voice recognition software. Minor typographical errors may be present. If questions arise, please do not hesitate to contact me or call our department.

## 2021-05-14 NOTE — DISCHARGE INSTRUCTIONS
-As we discussed please call your cardiologist to schedule follow-up appointment so that at that appointment they can review your loop recorder to further evaluate your episode of tachycardia today.  -Additionally, please follow-up with your primary care provider regarding your low blood hemoglobin/anemia that was noted on your laboratory evaluation today.  -It is your responsibility to arrange for follow-up care as directed. -Please take all medications prescribed today as per the instructions on the packaging. Prior to taking any medication, it is recommended that you read the warning labels of the medications to understand the potential side effects and warnings of any medication you are taking. If you have questions or concerns, please discuss with your primary care provider for further information prior to taking the medication or call the emergency department for more information.   -Should you develop any new or worsening symptoms or have any questions or concerns please return to this Emergency Department or any other Emergency Department for further evaluation.

## 2021-05-15 LAB
ATRIAL RATE: 85 BPM
CALCULATED P AXIS, ECG09: 62 DEGREES
CALCULATED R AXIS, ECG10: 23 DEGREES
CALCULATED T AXIS, ECG11: 43 DEGREES
DIAGNOSIS, 93000: NORMAL
P-R INTERVAL, ECG05: 184 MS
Q-T INTERVAL, ECG07: 374 MS
QRS DURATION, ECG06: 80 MS
QTC CALCULATION (BEZET), ECG08: 445 MS
VENTRICULAR RATE, ECG03: 85 BPM

## 2021-05-18 NOTE — PROGRESS NOTES
Chief Complaint : Post ER visit. S/p EP study. HPI:  Moises Estevez is a 63 y.o. female with PMHx significant for paroxysmal atrial fibrillation, supraventricular tachycardia (s/p loop recorder, initially placed October, 2020) palpitations, hypertension, sick sinus syndrome and anemia. Patient was last seen by Dr. Hossein Strong in April 2021 for routine follow up. During visit, she continued to complain of palpitations. Given her history of previous SVT, as well as atrial fibrillation and symptoms, it was discussed to proceed with EP study with possible right sided ablation. EP study was performed on 4.26.21. Findings: inducible atrial tachycardia 160-190 bpm. No inducible SVT, atrial fibrillation or atrial flutter. Metoprolol was increased to 50mg twice daily. Patient presents today following a recent ER visit re: complaints of rapid heart rate. Ekg performed by EMS showed a regular narrow complex tachycardia at a rate of 150 consistent with likely PSVT versus atrial flutter with 2:1 conduction block. A 12-lead Ekg was performed with no evidence of significant dysrhythmia, ectopy or accessory pathways and no evidence of acute ischemia. Patient was discharged home and instructed to follow up with her cardiologist.     Patient complains of sporadic palpitations and rapid heart rate. She denies chest pain, tightness, heaviness. Denies shortness of breath at rest, admits to dyspnea on exertion. Denies orthopnea and PND. Denies lightheadedness, dizziness, and syncope. Denies lower extremity edema and claudication.         Past Medical History:  Past Medical History:   Diagnosis Date    Arthritis     bl knees     Atrial fibrillation ([address]     Essential hypertension        Surgical History:  Past Surgical History:   Procedure Laterality Date    SD COMPRE ELECTROPHYSIOL XM W/LEFT ATRIAL PACNG/REC N/A 4/26/2021    Lt Atrial Pace & Record During Ep Study performed by Luis Manuel Jaramillo MD at [facility] CATH LAB    ID COMPRE ELECTROPHYSIOLOGIC ARRHYTHMIA INDUCTION N/A 4/26/2021    EP STUDY COMPLETE performed by Isabel Batista MD at [facility] CATH [facility]    ID INSERTION [address] W/PRGRMG N/A 10/28/2020    LOOP RECORDER INSERT performed by Isabel Batista MD at [facility] LAB    STIM/PACING HEART POST IV DRUG INFU N/A 4/26/2021    Drug Stimulation performed by Isabel Batista MD at [facility] LAB        Social History:  Social History     Socioeconomic History    Marital status: LEGALLY      Spouse name: Not on file    Number of children: Not on file    Years of education: Not on file    Highest education level: Not on file   Occupational History    Not on file   Social Needs    Financial resource strain: Not on file    Food insecurity     Worry: Not on file     Inability: Not on file    Transportation needs     Medical: Not on file     Non-medical: Not on file   Tobacco Use    Smoking status: Never Smoker    Smokeless tobacco: Never Used   Substance and Sexual Activity    Alcohol use: No    Drug use: No    Sexual activity: Not on file   Lifestyle    Physical activity     Days per week: Not on file     Minutes per session: Not on file    Stress: Not on file   Relationships    Social connections     Talks on phone: Not on file     Gets together: Not on file     Attends Congregation service: Not on file     Active member of club or organization: Not on file     Attends meetings of clubs or organizations: Not on file     Relationship status: Not on file    Intimate partner violence     Fear of current or ex partner: Not on file     Emotionally abused: Not on file     Physically abused: Not on file     Forced sexual activity: Not on file   Other Topics Concern    Not on file   Social History Narrative    Not on file        Family History:  Family History   Problem Relation Age of Onset    Cancer Mother     Diabetes Mother     Cancer Father     Heart Attack Brother         Allergies:  No Known Allergies     Current Medications:  Current Outpatient Medications   Medication Sig Dispense Refill    metoprolol tartrate (LOPRESSOR) 25 mg tablet Take 2 Tabs by mouth two (2) times a day. Or as directed 180 Tab 3    amLODIPine (NORVASC) 10 mg tablet Take 10 mg by mouth daily.  apixaban (Eliquis) 5 mg tablet TAKE 1 TABLET BY MOUTH TWO TIMES A  Tab 3    cyanocobalamin (Vitamin B-12) 100 mcg tablet Take 100 mcg by mouth daily.  multivitamin (ONE A DAY) tablet Take 1 Tab by mouth daily.  omeprazole (PRILOSEC) 20 mg capsule Take 20 mg by mouth daily.  cyclobenzaprine (FLEXERIL) 10 mg tablet Take  by mouth three (3) times daily as needed for Muscle Spasm(s).  acetaminophen (TylenoL) 325 mg tablet Take  by mouth every four (4) hours as needed for Pain.  diclofenac EC (VOLTAREN) 75 mg EC tablet Take 75 mg by mouth two (2) times a day.  cholecalciferol, vitamin D3, (VITAMIN D3) 2,000 unit tab Take  by mouth daily.  meclizine (ANTIVERT) 25 mg tablet Take  by mouth three (3) times daily as needed. Review of systems:  Review of Systems   Constitutional: Negative for malaise/fatigue. Respiratory: Positive for shortness of breath. Cardiovascular: Positive for palpitations. Negative for chest pain, orthopnea, claudication, leg swelling and PND. Gastrointestinal: Negative for blood in stool. Musculoskeletal: Negative for falls and myalgias. Wt Readings from Last 3 Encounters:   21 106.6 kg (235 lb)   21 106.1 kg (234 lb)   21 105.7 kg (233 lb)     BP Readings from Last 3 Encounters:   21 (!) 111/58   21 (!) 147/88   21 130/84     Pulse Readings from Last 3 Encounters:   21 84   21 76   21 77       EK.19.21: No Ekg performed today. Physical Exam:  Physical Exam  Constitutional:       Appearance: Normal appearance.    HENT:      Head: Normocephalic and atraumatic. Eyes:      Extraocular Movements: Extraocular movements intact. Pupils: Pupils are equal, round, and reactive to light. Cardiovascular:      Rate and Rhythm: Normal rate and regular rhythm. Heart sounds: No murmur heard. No friction rub. No gallop. Pulmonary:      Effort: Pulmonary effort is normal.      Breath sounds: Normal breath sounds. No wheezing, rhonchi or rales. Chest:      Chest wall: No tenderness. Abdominal:      General: Bowel sounds are normal.   Musculoskeletal:         General: Normal range of motion. Cervical back: Normal range of motion and neck supple. Right lower leg: No edema. Left lower leg: No edema. Skin:     General: Skin is warm and dry. Neurological:      Mental Status: She is alert and oriented to person, place, and time.           Labs  Lab Results   Component Value Date/Time    WBC 4.5 (L) 05/14/2021 05:37 PM    HGB 7.5 (L) 05/14/2021 05:37 PM    HCT 25.3 (L) 05/14/2021 05:37 PM    PLATELET 810 60/42/9565 05:37 PM    MCV 70.5 (L) 05/14/2021 05:37 PM     Lab Results   Component Value Date/Time    Sodium 141 05/14/2021 05:37 PM    Potassium 3.6 05/14/2021 05:37 PM    Chloride 108 05/14/2021 05:37 PM    CO2 22 05/14/2021 05:37 PM    Anion gap 11 05/14/2021 05:37 PM    Glucose 82 05/14/2021 05:37 PM    BUN 22 (H) 05/14/2021 05:37 PM    Creatinine 1.02 05/14/2021 05:37 PM    BUN/Creatinine ratio 22 (H) 05/14/2021 05:37 PM    GFR est AA >60 05/14/2021 05:37 PM    GFR est non-AA 55 (L) 05/14/2021 05:37 PM    Calcium 8.5 05/14/2021 05:37 PM     Lab Results   Component Value Date/Time    Cholesterol, total 171 07/07/2020 07:30 AM    Cholesterol, total 174 08/10/2019 07:34 AM    Cholesterol, total 185 01/08/2019 07:37 AM    HDL Cholesterol 61 (H) 07/07/2020 07:30 AM    HDL Cholesterol 63 (H) 08/10/2019 07:34 AM    HDL Cholesterol 57 01/08/2019 07:37 AM    LDL, calculated 103.2 (H) 07/07/2020 07:30 AM    LDL, calculated 104.6 (H) 08/10/2019 07:34 AM    LDL, calculated 122.6 (H) 01/08/2019 07:37 AM    Triglyceride 34 07/07/2020 07:30 AM    Triglyceride 32 08/10/2019 07:34 AM    Triglyceride 27 01/08/2019 07:37 AM    CHOL/HDL Ratio 2.8 07/07/2020 07:30 AM    CHOL/HDL Ratio 2.8 08/10/2019 07:34 AM    CHOL/HDL Ratio 3.2 01/08/2019 07:37 AM      Lab Results   Component Value Date/Time    NT pro- 09/10/2020 10:58 AM        Impression/Plan:    1. Paroxysmal atrial fibrillation  - Continue metoprolol 50mg twice daily. - Continue eliquis 5mg twice daily. 2. Atrial tachycardia/palpitations  - Continue metoprolol 50mg twice daily. - [facility] on 5.14.21 with atrial tachycardia with HR in the 170s. - Considering hx of sinus bradycardia, will leave dose adjustments for metoprolol up to primary cardiologist.     3. Sick sinus syndrome  - Patient had been taking low dose Metoprolol, limited due to sinus bradycardia. - Metoprolol recently increased to 50mg twice daily following EP study. 4. Hypertension  - Bp well controlled, 120/80.    - Continue metoprolol 50mg twice daily. - Continue amlodipine 10mg daily. 5. Anemia  - Most recent hgb, 7.5. Patient instructed to follow up with primary care physician. Follow up with Dr. Aaron Parada as scheduled. Patient encouraged to follow up sooner if symptoms worsen or fail to improve. Thank you for allowing me to participate in the care of your patient. Please do not hesitate to call with questions or concerns.      Thecamila Parnell, NP

## 2021-05-19 ENCOUNTER — OFFICE VISIT (OUTPATIENT)
Dept: CARDIOLOGY CLINIC | Age: 61
End: 2021-05-19
Payer: COMMERCIAL

## 2021-05-19 VITALS
SYSTOLIC BLOOD PRESSURE: 120 MMHG | HEIGHT: 62 IN | WEIGHT: 237 LBS | BODY MASS INDEX: 43.61 KG/M2 | HEART RATE: 70 BPM | OXYGEN SATURATION: 98 % | DIASTOLIC BLOOD PRESSURE: 80 MMHG

## 2021-05-19 DIAGNOSIS — R00.2 PALPITATIONS: Primary | ICD-10-CM

## 2021-05-19 PROCEDURE — 99213 OFFICE O/P EST LOW 20 MIN: CPT | Performed by: NURSE PRACTITIONER

## 2021-05-19 NOTE — PATIENT INSTRUCTIONS
Plan: - Continue current medication regimen. - Follow up with Dr. Hossein Strong as scheduled. - You are encouraged to follow up sooner if symptoms worsen or fail to improve.

## 2021-05-19 NOTE — PROGRESS NOTES
Angel Boyer presents today for   Chief Complaint   Patient presents with   [facility] ED Follow-up     follow up from ED on 5/14/21     Shortness of Breath     exertional, as well as when having palps     Palpitations     racing/fluttering    Dizziness     daily        Nupurfie Merlin preferred language for health care discussion is english/other. Is someone accompanying this pt? no    Is the patient using any DME equipment during [address]? no    Depression Screening:  3 most recent PHQ Screens 4/7/2021   Little interest or pleasure in doing things Not at all   Feeling down, depressed, irritable, or hopeless Not at all   Total Score PHQ 2 0       Learning Assessment:  Learning Assessment 2/3/2021   PRIMARY LEARNER Patient   PRIMARY LANGUAGE ENGLISH   LEARNER PREFERENCE PRIMARY DEMONSTRATION   ANSWERED BY patient   RELATIONSHIP SELF       Abuse Screening:  Abuse Screening Questionnaire 4/7/2021   Do you ever feel afraid of your partner? N   Are you in a relationship with someone who physically or mentally threatens you? N   Is it safe for you to go home? Y       Fall Risk  Fall Risk Assessment, last 12 mths 2/3/2021   Able to walk? Yes   Fall in past 12 months? 0   Do you feel unsteady? 0   Are you worried about falling 0       Pt currently taking Anticoagulant therapy? Eliquis     Coordination of Care:  1. Have you been to the ER, urgent care clinic since your last visit? Hospitalized since your last visit? 5/14/21 for rapid heart beat     2. Have you seen or consulted any other health care providers outside of the [address] [place] [address] since your last visit? Include any pap smears or colon screening.  no

## 2021-05-19 NOTE — LETTER
NOTIFICATION RETURN TO WORK / SCHOOL 
 
5/19/2021 8:55 AM 
 
Ms. [facility] 
Matt Hubbard [address] [place] [id number] To Whom It May Concern: [facility] is currently under the care of [address] [place] [address]. She will return to work on: Wednesday May 19, 2021. Please allow light duty. If there are questions or concerns please have the patient contact our office.  
 
 
 
Sincerely, 
 
 
 
Della Luna NP

## 2021-05-25 NOTE — PROGRESS NOTES
I have personally seen and evaluated the device findings. Interrogation reviewed and I agree with assessment.     Mike Flowers

## 2021-06-02 ENCOUNTER — CLINICAL SUPPORT (OUTPATIENT)
Dept: CARDIOLOGY CLINIC | Age: 61
End: 2021-06-02

## 2021-06-02 ENCOUNTER — TRANSCRIBE ORDER (OUTPATIENT)
Dept: CARDIAC CATH/INVASIVE PROCEDURES | Age: 61
End: 2021-06-02

## 2021-06-02 ENCOUNTER — OFFICE VISIT (OUTPATIENT)
Dept: CARDIOLOGY CLINIC | Age: 61
End: 2021-06-02
Payer: COMMERCIAL

## 2021-06-02 VITALS
BODY MASS INDEX: 43.79 KG/M2 | HEIGHT: 62 IN | WEIGHT: 238 LBS | DIASTOLIC BLOOD PRESSURE: 86 MMHG | OXYGEN SATURATION: 100 % | SYSTOLIC BLOOD PRESSURE: 124 MMHG | HEART RATE: 66 BPM

## 2021-06-02 DIAGNOSIS — I10 ESSENTIAL HYPERTENSION: ICD-10-CM

## 2021-06-02 DIAGNOSIS — Z95.9 CARDIAC DEVICE IN SITU: Primary | ICD-10-CM

## 2021-06-02 DIAGNOSIS — I48.91 AFIB (HCC): Primary | ICD-10-CM

## 2021-06-02 DIAGNOSIS — Z79.01 ON APIXABAN THERAPY: ICD-10-CM

## 2021-06-02 DIAGNOSIS — I47.1 ATRIAL TACHYCARDIA (HCC): ICD-10-CM

## 2021-06-02 DIAGNOSIS — R00.2 PALPITATIONS: ICD-10-CM

## 2021-06-02 DIAGNOSIS — I48.0 PAROXYSMAL ATRIAL FIBRILLATION (HCC): ICD-10-CM

## 2021-06-02 DIAGNOSIS — R53.83 FATIGUE, UNSPECIFIED TYPE: ICD-10-CM

## 2021-06-02 DIAGNOSIS — I47.1 SVT (SUPRAVENTRICULAR TACHYCARDIA) (HCC): ICD-10-CM

## 2021-06-02 PROCEDURE — 93291 INTERROG DEV EVAL SCRMS IP: CPT | Performed by: INTERNAL MEDICINE

## 2021-06-02 PROCEDURE — 99215 OFFICE O/P EST HI 40 MIN: CPT | Performed by: INTERNAL MEDICINE

## 2021-06-02 NOTE — LETTER
NOTIFICATION RETURN TO WORK  
 
6/2/2021 8:55 AM 
 
[facility] 
[place] [id number] To Whom It May Concern: [facility] is currently under the care of [address] [place] [address]. She will return to work on: Wednesday Marisela 3, 2021. Please allow light duty until July 26, 2021. If there are questions or concerns please have the patient contact our office. Sincerely,       Laverle Phalen, MD

## 2021-06-02 NOTE — PATIENT INSTRUCTIONS
DR. [facility] Patient  EP Instructions 1. You are scheduled to have a EDITA with Afib/ A-tach ablation on  xxx , at xxx. Please check in at xxx. 2. Please go to DR. NARANJO'CONI LOPEZ and yady in the outpatient parking lot that is located around to the back of the hospital and enter through the [facility] building. Once you enter through the [facility] check in with the  there. The  will either give you directions or assist you in getting to the cath holding area. 3.  You are not to eat or drink anything after midnight the night before your                   procedure. 4. Please continue to take your medications with a small sip of water on the morning of the procedure with the following exceptions:  Hold Metoprolol and Eliquis for 48 hours prior to procedure 5. If you are diabetic, do not take your insulin/sugar pill the morning of the procedure. 6. We encourage families to wait in the waiting room on the first floor while the procedure is being done. The Doctor will come out and talk with you as soon as the procedure is over. 7. There is the possibility that you may spend the night in the hospital, depending on the results of the procedure. This will be determined after the procedure is done. 8.   If you or your family have any questions, please call our office Monday-Friday 9:00am  
      -4:30 pm , at [phone], and ask to speak to one of the nurses.

## 2021-06-02 NOTE — LETTER
NOTIFICATION RETURN TO WORK  
 
6/2/2021 8:55 AM 
 
[facility] [address] [place] [id number] To Whom It May Concern: [facility] the [facility] of [address] [place] [address]. She will return to work on: Thursday Marisela 3, 2021. Please allow light duty until July 26, 2021. If there are questions or concerns please have the patient contact our office. Sincerely,       Laverle Phalen, MD

## 2021-06-02 NOTE — LETTER
6/2/2021 8:41 AM 
 
Ms. [facility] 
Matt Hubbard [address] 
[facility] [id number] [facility] 
[id number] 
1960 [facility]:  [facility] _____________________ Proc Date: June 29, 2021                Proc Time:  800am 
 
Performing MD : Dr. Avelino Marr                      Procedure:EDITA and Possible Afib Ablation Hospital:  [facility] Scheduled with:  Mane Gibbons                                                        Date:6/2/2021 HP: ______      EKG: ______    Labs:______  CXR: _______  Orders:  _______ Special Instructions:  _____________________________________________________ 
______________________________________________________________________ 
______________________________________________________________________ Date Faxed:   ______________   Pages Faxed: ___________________ The materials enclosed with this facsimile transmission are private and confidential and are the property of the sender. If you are not the intended recipient, be advised that any unauthorized use, disclosure, copying, distribution, or the taking of any action in reliance on the contents of this telecopied information is strictly prohibited. If you have received this in error, please immediately notify the sender via telephone to arrange for return of the forwarded documentation.

## 2021-06-02 NOTE — PROGRESS NOTES
History of Present Illness:  63year old female here for follow up. She underwent EP study April 26, 2021. She was found to have atrial fibrillation, as well as inducible atrial tachycardia between 160-190 bpm, appearing to be left sided near the pulmonary veins. I increased her Lopressor to 50 mg b.i.d. She was already taking Eliquis. Today she continues to complain of palpitations on and off, as well as significant associated fatigue. There was no change with medical therapy. She is here to discuss options. No syncope or chest pain. Impression:  1. Paroxysmal atrial fibrillation, on Eliquis. 2. Focal atrial tachycardia by EP study 04/26/21, no evidence of persistent or sustained atrial flutter or AVNRT or accessory pathway. 3. Subcutaneous loop recorder placed October, 2020 at [facility], confirming multiple episodes corresponding with her palpitations, with heart rate 150s-160s. 4. History of previous anemia and heavy menses, on iron replacement, stable. 5. History of sick sinus syndrome, limiting treatment with beta blocker and AV blocking agents. 6. Hypertension. 7. History of spinal stenosis. 8. Pneumonia September, 2020 at [facility], status post antibiotics. 9. Echocardiogram September, 2020 with EF 55%, mild LVH, left atrial size appears normal.    Plan:  I had a lengthy discussion about her atrial fibrillation, as well as atrial tachycardia, likely causing her symptoms. It appears that she would benefit from a combination of pulmonary vein isolation, as well as mapping of her atrial tachycardia if needed. Risks, benefits and alternatives discussed. All questions answered and I will make arrangements. My plan is to hold the Metoprolol, as well as Eliquis, 48 hours prior to the procedure. I discussed spending the night in the hospital as this would require left access.     Past Medical History:   Diagnosis Date    Arthritis     bl knees     Atrial fibrillation (HCC)     Essential hypertension Current Outpatient Medications   Medication Sig Dispense Refill    metoprolol tartrate (LOPRESSOR) 25 mg tablet Take 2 Tabs by mouth two (2) times a day. Or as directed 180 Tab 3    amLODIPine (NORVASC) 10 mg tablet Take 10 mg by mouth daily.  apixaban (Eliquis) 5 mg tablet TAKE 1 TABLET BY MOUTH TWO TIMES A  Tab 3    cyanocobalamin (Vitamin B-12) 100 mcg tablet Take 100 mcg by mouth daily.  multivitamin (ONE A DAY) tablet Take 1 Tab by mouth daily.  omeprazole (PRILOSEC) 20 mg capsule Take 20 mg by mouth daily.  cyclobenzaprine (FLEXERIL) 10 mg tablet Take  by mouth three (3) times daily as needed for Muscle Spasm(s).  acetaminophen (TylenoL) 325 mg tablet Take  by mouth every four (4) hours as needed for Pain.  diclofenac EC (VOLTAREN) 75 mg EC tablet Take 75 mg by mouth two (2) times a day.  cholecalciferol, vitamin D3, (VITAMIN D3) 2,000 unit tab Take  by mouth daily.  meclizine (ANTIVERT) 25 mg tablet Take  by mouth three (3) times daily as needed. Social History   reports that she has never smoked. She has never used smokeless tobacco.   reports no history of alcohol use. Family History  family history includes Cancer in her father and mother; Diabetes in her mother; Heart Attack in her brother. Review of Systems  Except as stated above include:  Constitutional: Negative for fever, chills and malaise/fatigue. HEENT: No congestion or recent URI. Gastrointestinal: No nausea, vomiting, abdominal pain, bloody stools. Pulmonary:  Negative except as stated above. Cardiac:  Negative except as stated above. Musculoskeletal: Negative except as stated above. Neurological:  No localized symptoms. Skin:  Negative except as stated above. Psych:  Negative except as stated above. Endocrine:  Negative except as stated above.     PHYSICAL EXAM  BP Readings from Last 3 Encounters:   06/02/21 124/86   05/19/21 120/80   05/14/21 (!) 111/58     Pulse Readings from Last 3 Encounters:   06/02/21 66   05/19/21 70   05/14/21 84     Wt Readings from Last 3 Encounters:   06/02/21 108 kg (238 lb)   05/19/21 107.5 kg (237 lb)   05/14/21 106.6 kg (235 lb)     General:   Well developed, well groomed. Head/Neck:   No obvious jugular venous distention     No obvious carotid pulsations. No evidence of xanthelasma. Lungs:   No respiratory distress. Clear bilaterally. Heart:  Regular rate and rhythm. Normal S1/S2. Palpation grossly normal.    No significant murmurs, rubs or gallops. ILR intact. Abdomen:   Non-acute abdomen. No obvious pulsations. Extremities:   Intact peripheral pulses. No significant edema. Neurological:   Alert and oriented to person, place, time. No focal neurological deficit visually. Skin:   No obvious rash    Blood Pressure Metric:  Monitor recommended and adjustments stated if needed.

## 2021-06-02 NOTE — PROGRESS NOTES
Sabine Dus presents today for   Chief Complaint   Patient presents with    Follow-up     4-6 week follow up       Sabine Dus preferred language for health care discussion is english/other. Is someone accompanying this pt? no    Is the patient using any DME equipment during [address]? no    Depression Screening:  3 most recent PHQ Screens 6/2/2021   Little interest or pleasure in doing things Not at all   Feeling down, depressed, irritable, or hopeless Not at all   Total Score PHQ 2 0       Learning Assessment:  Learning Assessment 6/2/2021   PRIMARY LEARNER Patient   PRIMARY LANGUAGE ENGLISH   LEARNER PREFERENCE PRIMARY DEMONSTRATION   ANSWERED BY patient   RELATIONSHIP SELF       Abuse Screening:  Abuse Screening Questionnaire 6/2/2021   Do you ever feel afraid of your partner? N   Are you in a relationship with someone who physically or mentally threatens you? N   Is it safe for you to go home? Y       Fall Risk  Fall Risk Assessment, last 12 mths 2/3/2021   Able to walk? Yes   Fall in past 12 months? 0   Do you feel unsteady? 0   Are you worried about falling 0           Pt currently taking Anticoagulant therapy? Eliquis 5 mg twice a day    Pt currently taking Antiplatelet therapy ? no      Coordination of Care:  1. Have you been to the ER, urgent care clinic since your last visit? Hospitalized since your last visit? Yes for the Ablation    2. Have you seen or consulted any other health care providers outside of the [address] [place] [address] since your last visit? Include any pap smears or colon screening.  no

## 2021-06-02 NOTE — PROGRESS NOTES
I have personally seen and evaluated the device findings. Interrogation reviewed and I agree with assessment.     Edwin Munoz

## 2021-06-09 RX ORDER — SODIUM CHLORIDE 0.9 % (FLUSH) 0.9 %
5-40 SYRINGE (ML) INJECTION EVERY 8 HOURS
Status: CANCELLED | OUTPATIENT
Start: 2021-06-09

## 2021-06-09 RX ORDER — SODIUM CHLORIDE 0.9 % (FLUSH) 0.9 %
5-40 SYRINGE (ML) INJECTION AS NEEDED
Status: CANCELLED | OUTPATIENT
Start: 2021-06-09

## 2021-06-23 ENCOUNTER — HOSPITAL ENCOUNTER (OUTPATIENT)
Dept: LAB | Age: 61
Discharge: HOME | End: 2021-06-23
Payer: COMMERCIAL

## 2021-06-23 DIAGNOSIS — I48.0 PAROXYSMAL ATRIAL FIBRILLATION (HCC): ICD-10-CM

## 2021-06-23 DIAGNOSIS — I47.1 SVT (SUPRAVENTRICULAR TACHYCARDIA) (HCC): ICD-10-CM

## 2021-06-23 LAB
ALBUMIN SERPL-MCNC: 3.5 G/DL (ref 3.4–5)
ALBUMIN/GLOB SERPL: 0.8 {RATIO} (ref 0.8–1.7)
ALP SERPL-CCNC: 111 U/L (ref 45–117)
ALT SERPL-CCNC: 42 U/L (ref 13–56)
ANION GAP SERPL CALC-SCNC: 2 MMOL/L (ref 3–18)
AST SERPL-CCNC: 33 U/L (ref 10–38)
BASOPHILS # BLD: 0.1 K/UL (ref 0–0.1)
BASOPHILS NFR BLD: 1 % (ref 0–2)
BILIRUB SERPL-MCNC: 0.6 MG/DL (ref 0.2–1)
BUN SERPL-MCNC: 14 MG/DL (ref 7–18)
BUN/CREAT SERPL: 17 (ref 12–20)
CALCIUM SERPL-MCNC: 9 MG/DL (ref 8.5–10.1)
CHLORIDE SERPL-SCNC: 110 MMOL/L (ref 100–111)
CO2 SERPL-SCNC: 28 MMOL/L (ref 21–32)
CREAT SERPL-MCNC: 0.83 MG/DL (ref 0.6–1.3)
DIFFERENTIAL METHOD BLD: ABNORMAL
EOSINOPHIL # BLD: 0.3 K/UL (ref 0–0.4)
EOSINOPHIL NFR BLD: 8 % (ref 0–5)
ERYTHROCYTE [DISTWIDTH] IN BLOOD BY AUTOMATED COUNT: 18.7 % (ref 11.6–14.5)
GLOBULIN SER CALC-MCNC: 4.2 G/DL (ref 2–4)
GLUCOSE SERPL-MCNC: 84 MG/DL (ref 74–99)
HCT VFR BLD AUTO: 28.1 % (ref 35–45)
HGB BLD-MCNC: 8.1 G/DL (ref 12–16)
INR PPP: 1.1 (ref 0.8–1.2)
LYMPHOCYTES # BLD: 1.7 K/UL (ref 0.9–3.6)
LYMPHOCYTES NFR BLD: 41 % (ref 21–52)
MCH RBC QN AUTO: 20.5 PG (ref 24–34)
MCHC RBC AUTO-ENTMCNC: 28.8 G/DL (ref 31–37)
MCV RBC AUTO: 71.1 FL (ref 74–97)
MONOCYTES # BLD: 0.3 K/UL (ref 0.05–1.2)
MONOCYTES NFR BLD: 7 % (ref 3–10)
NEUTS SEG # BLD: 1.8 K/UL (ref 1.8–8)
NEUTS SEG NFR BLD: 42 % (ref 40–73)
PLATELET # BLD AUTO: 308 K/UL (ref 135–420)
PMV BLD AUTO: 9.9 FL (ref 9.2–11.8)
POTASSIUM SERPL-SCNC: 4.2 MMOL/L (ref 3.5–5.5)
PROT SERPL-MCNC: 7.7 G/DL (ref 6.4–8.2)
PROTHROMBIN TIME: 14.1 SEC (ref 11.5–15.2)
RBC # BLD AUTO: 3.95 M/UL (ref 4.2–5.3)
SODIUM SERPL-SCNC: 140 MMOL/L (ref 136–145)
WBC # BLD AUTO: 4.2 K/UL (ref 4.6–13.2)

## 2021-06-23 PROCEDURE — 85025 COMPLETE CBC W/AUTO DIFF WBC: CPT

## 2021-06-23 PROCEDURE — 36415 COLL VENOUS BLD VENIPUNCTURE: CPT

## 2021-06-23 PROCEDURE — 85610 PROTHROMBIN TIME: CPT

## 2021-06-23 PROCEDURE — 80053 COMPREHEN METABOLIC PANEL: CPT

## 2021-06-23 PROCEDURE — U0003 INFECTIOUS AGENT DETECTION BY NUCLEIC ACID (DNA OR RNA); SEVERE ACUTE RESPIRATORY SYNDROME CORONAVIRUS 2 (SARS-COV-2) (CORONAVIRUS DISEASE [COVID-19]), AMPLIFIED PROBE TECHNIQUE, MAKING USE OF HIGH THROUGHPUT TECHNOLOGIES AS DESCRIBED BY CMS-2020-01-R: HCPCS

## 2021-06-24 LAB — SARS-COV-2, COV2NT: NOT DETECTED

## 2021-06-25 ENCOUNTER — HOSPITAL ENCOUNTER (OUTPATIENT)
Dept: CT IMAGING | Age: 61
Discharge: HOME | End: 2021-06-25
Attending: INTERNAL MEDICINE
Payer: COMMERCIAL

## 2021-06-25 DIAGNOSIS — I47.1 SVT (SUPRAVENTRICULAR TACHYCARDIA) (HCC): ICD-10-CM

## 2021-06-25 DIAGNOSIS — I48.0 PAROXYSMAL ATRIAL FIBRILLATION (HCC): ICD-10-CM

## 2021-06-25 PROCEDURE — 74011000636 HC RX REV CODE- 636: Performed by: INTERNAL MEDICINE

## 2021-06-25 PROCEDURE — 75572 CT HRT W/3D IMAGE: CPT

## 2021-06-25 RX ADMIN — IOPAMIDOL 110 ML: 755 INJECTION, SOLUTION INTRAVENOUS at 17:52

## 2021-06-25 NOTE — H&P
Plan EDITA wt A.fib ablation/mapping of left atrial tachycardia. I have discussed indications, procedures, risks, limitations, and follow up associated with transesophageal echo, electrophysiology study, intracardiac echo, transseptal heart cath, intracardiac mapping, and catheter ablation for atrial fibrillation. Risks included acute respiratory failure, neurovascular injury, soft tissue injury, infection, bleeding, DVT, radiation exposure, atrial septal defect, iv contrast nephropathy, IV contrast allergic reaction, atrial septal defect, perforation, tamponade, potential need for emergent heart surgery, pulmonary vein stenosis that may or may not be amenable to stent placement, phrenic nerve injury/diaphragmatic paralysis that may or may not recover with time, heart block and need for permanent pacing, esophageal injury, dysmotility, or death associated with atrial-esophageal fistula formation, MI, CVA, and death. Reviewed that some of these complications may not be apparent at the time of the procedure. The patient acknowledged these risks and would like to proceed. History of Present Illness:  63year old female here for follow up. She underwent EP study April 26, 2021. She was found to have atrial fibrillation, as well as inducible atrial tachycardia between 160-190 bpm, appearing to be left sided near the pulmonary veins. I increased her Lopressor to 50 mg b.i.d. She was already taking Eliquis. Today she continues to complain of palpitations on and off, as well as significant associated fatigue. There was no change with medical therapy. She is here to discuss options. No syncope or chest pain.     Impression:  1. Paroxysmal atrial fibrillation, on Eliquis. 2. Focal atrial tachycardia by EP study 04/26/21, no evidence of persistent or sustained atrial flutter or AVNRT or accessory pathway.   3. Subcutaneous loop recorder placed October, 2020 at [facility], confirming multiple episodes corresponding with her palpitations, with heart rate 150s-160s. 4. History of previous anemia and heavy menses, on iron replacement, stable. 5. History of sick sinus syndrome, limiting treatment with beta blocker and AV blocking agents. 6. Hypertension. 7. History of spinal stenosis. 8. Pneumonia September, 2020 at [facility], status post antibiotics. 9. Echocardiogram September, 2020 with EF 55%, mild LVH, left atrial size appears normal.     Plan:  I had a lengthy discussion about her atrial fibrillation, as well as atrial tachycardia, likely causing her symptoms. It appears that she would benefit from a combination of pulmonary vein isolation, as well as mapping of her atrial tachycardia if needed. Risks, benefits and alternatives discussed. All questions answered and I will make arrangements. My plan is to hold the Metoprolol, as well as Eliquis, 48 hours prior to the procedure. I discussed spending the night in the hospital as this would require left access.          Past Medical History:   Diagnosis Date    Arthritis       bl knees     Atrial fibrillation (HCC)      Essential hypertension                  Current Outpatient Medications   Medication Sig Dispense Refill    metoprolol tartrate (LOPRESSOR) 25 mg tablet Take 2 Tabs by mouth two (2) times a day. Or as directed 180 Tab 3    amLODIPine (NORVASC) 10 mg tablet Take 10 mg by mouth daily.        apixaban (Eliquis) 5 mg tablet TAKE 1 TABLET BY MOUTH TWO TIMES A  Tab 3    cyanocobalamin (Vitamin B-12) 100 mcg tablet Take 100 mcg by mouth daily.        multivitamin (ONE A DAY) tablet Take 1 Tab by mouth daily.        omeprazole (PRILOSEC) 20 mg capsule Take 20 mg by mouth daily.        cyclobenzaprine (FLEXERIL) 10 mg tablet Take  by mouth three (3) times daily as needed for Muscle Spasm(s).         acetaminophen (TylenoL) 325 mg tablet Take  by mouth every four (4) hours as needed for Pain.        diclofenac EC (VOLTAREN) 75 mg EC tablet Take 75 mg by mouth two (2) times a day.        cholecalciferol, vitamin D3, (VITAMIN D3) 2,000 unit tab Take  by mouth daily.        meclizine (ANTIVERT) 25 mg tablet Take  by mouth three (3) times daily as needed.             Social History   reports that she has never smoked. She has never used smokeless tobacco.   reports no history of alcohol use.     Family History  family history includes Cancer in her father and mother; Diabetes in her mother; Heart Attack in her brother.     Review of Systems  Except as stated above include:  Constitutional: Negative for fever, chills and malaise/fatigue. HEENT: No congestion or recent URI. Gastrointestinal: No nausea, vomiting, abdominal pain, bloody stools. Pulmonary:  Negative except as stated above. Cardiac:  Negative except as stated above. Musculoskeletal: Negative except as stated above. Neurological:  No localized symptoms. Skin:  Negative except as stated above. Psych:  Negative except as stated above. Endocrine:  Negative except as stated above.     PHYSICAL EXAM      BP Readings from Last 3 Encounters:   06/02/21 124/86   05/19/21 120/80   05/14/21 (!) 111/58          Pulse Readings from Last 3 Encounters:   06/02/21 66   05/19/21 70   05/14/21 84          Wt Readings from Last 3 Encounters:   06/02/21 108 kg (238 lb)   05/19/21 107.5 kg (237 lb)   05/14/21 106.6 kg (235 lb)      General:          Well developed, well groomed. Head/Neck:     No obvious jugular venous distention                           No obvious carotid pulsations. No evidence of xanthelasma. Lungs:             No respiratory distress. Clear bilaterally. Heart:              Regular rate and rhythm. Normal S1/S2. Palpation grossly normal.                          No significant murmurs, rubs or gallops. ILR intact. Abdomen:        Non-acute abdomen.                             No obvious pulsations. Extremities:     Intact peripheral pulses. No significant edema. Neurological:   Alert and oriented to person, place, time. No focal neurological deficit visually.   Skin:                No obvious rash     Blood Pressure Metric:  Monitor recommended and adjustments stated if needed.         Electronically signed by Lara Camargo MD at 06/02/21 5742

## 2021-06-29 ENCOUNTER — ANESTHESIA EVENT (OUTPATIENT)
Dept: CARDIAC CATH/INVASIVE PROCEDURES | Age: 61
End: 2021-06-29
Payer: COMMERCIAL

## 2021-06-29 ENCOUNTER — HOSPITAL ENCOUNTER (OUTPATIENT)
Dept: NON INVASIVE DIAGNOSTICS | Age: 61
Discharge: HOME | End: 2021-06-29
Payer: COMMERCIAL

## 2021-06-29 ENCOUNTER — HOSPITAL ENCOUNTER (OUTPATIENT)
Age: 61
Setting detail: OBSERVATION
Discharge: HOME | End: 2021-06-30
Attending: INTERNAL MEDICINE | Admitting: INTERNAL MEDICINE
Payer: COMMERCIAL

## 2021-06-29 ENCOUNTER — ANESTHESIA (OUTPATIENT)
Dept: CARDIAC CATH/INVASIVE PROCEDURES | Age: 61
End: 2021-06-29
Payer: COMMERCIAL

## 2021-06-29 VITALS
DIASTOLIC BLOOD PRESSURE: 79 MMHG | SYSTOLIC BLOOD PRESSURE: 147 MMHG | HEIGHT: 62 IN | BODY MASS INDEX: 43.79 KG/M2 | WEIGHT: 238 LBS

## 2021-06-29 DIAGNOSIS — I48.91 AFIB (HCC): ICD-10-CM

## 2021-06-29 DIAGNOSIS — I48.0 PAROXYSMAL ATRIAL FIBRILLATION (HCC): ICD-10-CM

## 2021-06-29 DIAGNOSIS — R00.0 TACHYCARDIA: ICD-10-CM

## 2021-06-29 LAB
ACT BLD: 158 SECS (ref 79–138)
ACT BLD: 169 SECS (ref 79–138)
ACT BLD: 186 SECS (ref 79–138)
ACT BLD: 191 SECS (ref 79–138)
ACT BLD: 230 SECS (ref 79–138)
ACT BLD: 279 SECS (ref 79–138)
ACT BLD: 301 SECS (ref 79–138)

## 2021-06-29 PROCEDURE — 99218 HC RM OBSERVATION: CPT

## 2021-06-29 PROCEDURE — 00537 ANES CARDIAC EP PROCEDURES: CPT | Performed by: ANESTHESIOLOGY

## 2021-06-29 PROCEDURE — 77030008683 HC TU ET CUF COVD -A: Performed by: ANESTHESIOLOGY

## 2021-06-29 PROCEDURE — 77030029163 HC CBL EP DX ACHV DISP MEDT -C: Performed by: INTERNAL MEDICINE

## 2021-06-29 PROCEDURE — 77030020506 HC NDL TRNSPTL NRG BAYL -F: Performed by: INTERNAL MEDICINE

## 2021-06-29 PROCEDURE — 77030008500 HC TBNG CONN PRSS BD -A: Performed by: ANESTHESIOLOGY

## 2021-06-29 PROCEDURE — 77030018729 HC ELECTRD DEFIB PAD CARD -B: Performed by: INTERNAL MEDICINE

## 2021-06-29 PROCEDURE — C1730 CATH, EP, 19 OR FEW ELECT: HCPCS | Performed by: INTERNAL MEDICINE

## 2021-06-29 PROCEDURE — C1769 GUIDE WIRE: HCPCS | Performed by: INTERNAL MEDICINE

## 2021-06-29 PROCEDURE — 74011250636 HC RX REV CODE- 250/636: Performed by: INTERNAL MEDICINE

## 2021-06-29 PROCEDURE — C1894 INTRO/SHEATH, NON-LASER: HCPCS | Performed by: INTERNAL MEDICINE

## 2021-06-29 PROCEDURE — 74011250636 HC RX REV CODE- 250/636: Performed by: ANESTHESIOLOGY

## 2021-06-29 PROCEDURE — 74011000258 HC RX REV CODE- 258: Performed by: PHYSICIAN ASSISTANT

## 2021-06-29 PROCEDURE — C1733 CATH, EP, OTHR THAN COOL-TIP: HCPCS | Performed by: INTERNAL MEDICINE

## 2021-06-29 PROCEDURE — 93621 COMP EP EVL L PAC&REC C SINS: CPT | Performed by: INTERNAL MEDICINE

## 2021-06-29 PROCEDURE — C1893 INTRO/SHEATH, FIXED,NON-PEEL: HCPCS | Performed by: INTERNAL MEDICINE

## 2021-06-29 PROCEDURE — 77030027107 HC PTCH EXT REF CARTO3 J&J -F: Performed by: INTERNAL MEDICINE

## 2021-06-29 PROCEDURE — 74011000258 HC RX REV CODE- 258: Performed by: ANESTHESIOLOGY

## 2021-06-29 PROCEDURE — 36620 INSERTION CATHETER ARTERY: CPT | Performed by: ANESTHESIOLOGY

## 2021-06-29 PROCEDURE — 93320 DOPPLER ECHO COMPLETE: CPT

## 2021-06-29 PROCEDURE — 77030030261 HC CBL UMB ELEC DISP MEDT -C: Performed by: INTERNAL MEDICINE

## 2021-06-29 PROCEDURE — 77030019896 HC KT ARTERIAL LN TELE -B: Performed by: INTERNAL MEDICINE

## 2021-06-29 PROCEDURE — 77030012468 HC VLV BLEEDBK CNTRL ABBT -B: Performed by: INTERNAL MEDICINE

## 2021-06-29 PROCEDURE — 77030013797 HC KT TRNSDUC PRSSR EDWD -A: Performed by: ANESTHESIOLOGY

## 2021-06-29 PROCEDURE — 93656 COMPRE EP EVAL ABLTJ ATR FIB: CPT | Performed by: INTERNAL MEDICINE

## 2021-06-29 PROCEDURE — 77030016570 HC BLNKT BAIR HGGR 3M -B: Performed by: INTERNAL MEDICINE

## 2021-06-29 PROCEDURE — 93005 ELECTROCARDIOGRAM TRACING: CPT

## 2021-06-29 PROCEDURE — 74011000250 HC RX REV CODE- 250: Performed by: ANESTHESIOLOGY

## 2021-06-29 PROCEDURE — 77030013079 HC BLNKT BAIR HGGR 3M -A: Performed by: ANESTHESIOLOGY

## 2021-06-29 PROCEDURE — 85347 COAGULATION TIME ACTIVATED: CPT

## 2021-06-29 PROCEDURE — 76060000037 HC ANESTHESIA 3 TO 3.5 HR: Performed by: INTERNAL MEDICINE

## 2021-06-29 PROCEDURE — 74011250637 HC RX REV CODE- 250/637: Performed by: INTERNAL MEDICINE

## 2021-06-29 PROCEDURE — 77030035291 HC TBNG PMP SMARTABLATE J&J -B: Performed by: INTERNAL MEDICINE

## 2021-06-29 PROCEDURE — 77030002996 HC SUT SLK J&J -A: Performed by: INTERNAL MEDICINE

## 2021-06-29 PROCEDURE — 74011250636 HC RX REV CODE- 250/636: Performed by: PHYSICIAN ASSISTANT

## 2021-06-29 PROCEDURE — 77030005401 HC CATH RAD ARRO -A: Performed by: ANESTHESIOLOGY

## 2021-06-29 PROCEDURE — 77030013797 HC KT TRNSDUC PRSSR EDWD -A: Performed by: INTERNAL MEDICINE

## 2021-06-29 PROCEDURE — 74011000250 HC RX REV CODE- 250: Performed by: INTERNAL MEDICINE

## 2021-06-29 RX ORDER — PROTAMINE SULFATE 10 MG/ML
INJECTION, SOLUTION INTRAVENOUS AS NEEDED
Status: DISCONTINUED | OUTPATIENT
Start: 2021-06-29 | End: 2021-06-29 | Stop reason: HOSPADM

## 2021-06-29 RX ORDER — FLECAINIDE ACETATE 100 MG/1
50 TABLET ORAL 2 TIMES DAILY
Status: DISCONTINUED | OUTPATIENT
Start: 2021-06-29 | End: 2021-06-30 | Stop reason: HOSPADM

## 2021-06-29 RX ORDER — MECLIZINE HCL 12.5 MG 12.5 MG/1
25 TABLET ORAL
Status: DISCONTINUED | OUTPATIENT
Start: 2021-06-29 | End: 2021-06-30 | Stop reason: HOSPADM

## 2021-06-29 RX ORDER — METOPROLOL TARTRATE 5 MG/5ML
INJECTION INTRAVENOUS
Status: DISPENSED
Start: 2021-06-29 | End: 2021-06-30

## 2021-06-29 RX ORDER — CYCLOBENZAPRINE HCL 10 MG
10 TABLET ORAL
Status: DISCONTINUED | OUTPATIENT
Start: 2021-06-29 | End: 2021-06-30 | Stop reason: HOSPADM

## 2021-06-29 RX ORDER — OXYCODONE AND ACETAMINOPHEN 5; 325 MG/1; MG/1
1 TABLET ORAL
Status: DISCONTINUED | OUTPATIENT
Start: 2021-06-29 | End: 2021-06-30 | Stop reason: HOSPADM

## 2021-06-29 RX ORDER — METOPROLOL TARTRATE 5 MG/5ML
5 INJECTION INTRAVENOUS
Status: COMPLETED | OUTPATIENT
Start: 2021-06-29 | End: 2021-06-29

## 2021-06-29 RX ORDER — LIDOCAINE HYDROCHLORIDE 10 MG/ML
INJECTION, SOLUTION EPIDURAL; INFILTRATION; INTRACAUDAL; PERINEURAL AS NEEDED
Status: DISCONTINUED | OUTPATIENT
Start: 2021-06-29 | End: 2021-06-29 | Stop reason: HOSPADM

## 2021-06-29 RX ORDER — MIDAZOLAM HYDROCHLORIDE 1 MG/ML
INJECTION, SOLUTION INTRAMUSCULAR; INTRAVENOUS AS NEEDED
Status: DISCONTINUED | OUTPATIENT
Start: 2021-06-29 | End: 2021-06-29 | Stop reason: HOSPADM

## 2021-06-29 RX ORDER — SODIUM CHLORIDE 9 MG/ML
INJECTION, SOLUTION INTRAVENOUS
Status: DISCONTINUED | OUTPATIENT
Start: 2021-06-29 | End: 2021-06-29 | Stop reason: HOSPADM

## 2021-06-29 RX ORDER — LIDOCAINE HYDROCHLORIDE 20 MG/ML
INJECTION, SOLUTION EPIDURAL; INFILTRATION; INTRACAUDAL; PERINEURAL AS NEEDED
Status: DISCONTINUED | OUTPATIENT
Start: 2021-06-29 | End: 2021-06-29 | Stop reason: HOSPADM

## 2021-06-29 RX ORDER — ACETAMINOPHEN 325 MG/1
325 TABLET ORAL
Status: DISCONTINUED | OUTPATIENT
Start: 2021-06-29 | End: 2021-06-30 | Stop reason: HOSPADM

## 2021-06-29 RX ORDER — HEPARIN SODIUM 1000 [USP'U]/ML
INJECTION, SOLUTION INTRAVENOUS; SUBCUTANEOUS AS NEEDED
Status: DISCONTINUED | OUTPATIENT
Start: 2021-06-29 | End: 2021-06-29 | Stop reason: HOSPADM

## 2021-06-29 RX ORDER — EPHEDRINE SULFATE/0.9% NACL/PF 25 MG/5 ML
SYRINGE (ML) INTRAVENOUS AS NEEDED
Status: DISCONTINUED | OUTPATIENT
Start: 2021-06-29 | End: 2021-06-29 | Stop reason: HOSPADM

## 2021-06-29 RX ORDER — ONDANSETRON 2 MG/ML
INJECTION INTRAMUSCULAR; INTRAVENOUS
Status: DISPENSED
Start: 2021-06-29 | End: 2021-06-30

## 2021-06-29 RX ORDER — PANTOPRAZOLE SODIUM 20 MG/1
20 TABLET, DELAYED RELEASE ORAL
Status: DISCONTINUED | OUTPATIENT
Start: 2021-06-30 | End: 2021-06-30 | Stop reason: HOSPADM

## 2021-06-29 RX ORDER — PROPOFOL 10 MG/ML
INJECTION, EMULSION INTRAVENOUS AS NEEDED
Status: DISCONTINUED | OUTPATIENT
Start: 2021-06-29 | End: 2021-06-29 | Stop reason: HOSPADM

## 2021-06-29 RX ORDER — SUCCINYLCHOLINE CHLORIDE 20 MG/ML
INJECTION INTRAMUSCULAR; INTRAVENOUS AS NEEDED
Status: DISCONTINUED | OUTPATIENT
Start: 2021-06-29 | End: 2021-06-29 | Stop reason: HOSPADM

## 2021-06-29 RX ORDER — FENTANYL CITRATE 50 UG/ML
INJECTION, SOLUTION INTRAMUSCULAR; INTRAVENOUS AS NEEDED
Status: DISCONTINUED | OUTPATIENT
Start: 2021-06-29 | End: 2021-06-29 | Stop reason: HOSPADM

## 2021-06-29 RX ORDER — DEXAMETHASONE SODIUM PHOSPHATE 4 MG/ML
INJECTION, SOLUTION INTRA-ARTICULAR; INTRALESIONAL; INTRAMUSCULAR; INTRAVENOUS; SOFT TISSUE AS NEEDED
Status: DISCONTINUED | OUTPATIENT
Start: 2021-06-29 | End: 2021-06-29 | Stop reason: HOSPADM

## 2021-06-29 RX ORDER — AMLODIPINE BESYLATE 10 MG/1
10 TABLET ORAL DAILY
Status: DISCONTINUED | OUTPATIENT
Start: 2021-06-29 | End: 2021-06-30 | Stop reason: HOSPADM

## 2021-06-29 RX ORDER — METOPROLOL TARTRATE 50 MG/1
50 TABLET ORAL 2 TIMES DAILY
Status: DISCONTINUED | OUTPATIENT
Start: 2021-06-29 | End: 2021-06-30 | Stop reason: HOSPADM

## 2021-06-29 RX ADMIN — FLECAINIDE ACETATE 50 MG: 100 TABLET ORAL at 15:51

## 2021-06-29 RX ADMIN — Medication 10 MG: at 09:08

## 2021-06-29 RX ADMIN — PHENYLEPHRINE HYDROCHLORIDE 10 MCG/MIN: 10 INJECTION INTRAVENOUS at 08:30

## 2021-06-29 RX ADMIN — SUCCINYLCHOLINE CHLORIDE 100 MG: 20 INJECTION, SOLUTION INTRAMUSCULAR; INTRAVENOUS at 08:13

## 2021-06-29 RX ADMIN — DEXAMETHASONE SODIUM PHOSPHATE 4 MG: 4 INJECTION, SOLUTION INTRAMUSCULAR; INTRAVENOUS at 08:38

## 2021-06-29 RX ADMIN — LIDOCAINE HYDROCHLORIDE 60 MG: 20 INJECTION, SOLUTION EPIDURAL; INFILTRATION; INTRACAUDAL; PERINEURAL at 08:13

## 2021-06-29 RX ADMIN — Medication 20 MG: at 09:10

## 2021-06-29 RX ADMIN — OXYCODONE HYDROCHLORIDE AND ACETAMINOPHEN 1 TABLET: 5; 325 TABLET ORAL at 11:53

## 2021-06-29 RX ADMIN — Medication 10 MG: at 09:42

## 2021-06-29 RX ADMIN — ONDANSETRON HYDROCHLORIDE 4 MG: 2 SOLUTION INTRAMUSCULAR; INTRAVENOUS at 12:41

## 2021-06-29 RX ADMIN — FENTANYL CITRATE 25 MCG: 50 INJECTION, SOLUTION INTRAMUSCULAR; INTRAVENOUS at 08:05

## 2021-06-29 RX ADMIN — OXYCODONE HYDROCHLORIDE AND ACETAMINOPHEN 1 TABLET: 5; 325 TABLET ORAL at 18:26

## 2021-06-29 RX ADMIN — AMLODIPINE BESYLATE 10 MG: 10 TABLET ORAL at 15:50

## 2021-06-29 RX ADMIN — FENTANYL CITRATE 25 MCG: 50 INJECTION, SOLUTION INTRAMUSCULAR; INTRAVENOUS at 09:31

## 2021-06-29 RX ADMIN — FLECAINIDE ACETATE 50 MG: 100 TABLET ORAL at 18:20

## 2021-06-29 RX ADMIN — PROTAMINE SULFATE 50 MG: 10 INJECTION, SOLUTION INTRAVENOUS at 10:32

## 2021-06-29 RX ADMIN — APIXABAN 5 MG: 5 TABLET, FILM COATED ORAL at 15:50

## 2021-06-29 RX ADMIN — MIDAZOLAM HYDROCHLORIDE 1 MG: 2 INJECTION, SOLUTION INTRAMUSCULAR; INTRAVENOUS at 08:10

## 2021-06-29 RX ADMIN — FENTANYL CITRATE 50 MCG: 50 INJECTION, SOLUTION INTRAMUSCULAR; INTRAVENOUS at 08:40

## 2021-06-29 RX ADMIN — MIDAZOLAM HYDROCHLORIDE 1 MG: 2 INJECTION, SOLUTION INTRAMUSCULAR; INTRAVENOUS at 07:56

## 2021-06-29 RX ADMIN — PROPOFOL 150 MG: 10 INJECTION, EMULSION INTRAVENOUS at 08:13

## 2021-06-29 RX ADMIN — METOPROLOL TARTRATE 5 MG: 5 INJECTION, SOLUTION INTRAVENOUS at 12:22

## 2021-06-29 RX ADMIN — METOPROLOL TARTRATE 50 MG: 50 TABLET, FILM COATED ORAL at 18:20

## 2021-06-29 RX ADMIN — SODIUM CHLORIDE: 900 INJECTION, SOLUTION INTRAVENOUS at 07:56

## 2021-06-29 NOTE — PROGRESS NOTES
1220: Pt HR increase to 140-150s. RN page MD for orders. MD verbally order for metoprolol 5mg IV once. MD also request PA to assess pt at bedside. RN page PA. Pt request to use bedpan for elimination. RN assisted by second RN to help pt onto bedpan. Pt c/o nausea and starts actively vomiting. Second cath lab RN page MD for order and admin medication. Both vomiting and pt actively bearing down assisted pt's HR to return to baseline. PA at bedside for assessment. All groin sites with sheaths in place assessed as clean, dry, and intact despite events listed above.

## 2021-06-29 NOTE — PROGRESS NOTES
Cath holding summary     Patient escorted to cath holding from waiting area ambulatory, alert and oriented x 4, voicing no complaints. Changed into gown and placed on monitor. NPO since MN. Lab results, med rec and H&P reviewed on chart. PIV x 2 inserted without difficulty. 0890  TRANSFER - OUT REPORT:    Verbal report given to Veena JAVY(name) on Eufemia Labella  being transferred to EP lab(unit) for ordered procedure       Report consisted of patients Situation, Background, Assessment and   Recommendations(SBAR). Information from the following report(s) SBAR, MAR and Pre Procedure Checklist was reviewed with the receiving nurse. Lines:       Opportunity for questions and clarification was provided.       Patient transported with:   Iris [employer]

## 2021-06-29 NOTE — Clinical Note
TRANSFER - IN REPORT:     [facility] . Report consisted of patient's Situation, Background, Assessment and   Recommendations(SBAR). Opportunity for questions and clarification was provided. Assessment completed upon patient's arrival to unit and care assumed. Patient transported with a Cardiac Cath Tech / Patient Care Tech.

## 2021-06-29 NOTE — PROGRESS NOTES
6, 9 Fr sheath pulled from Left groin. 7, 14 Fr sheath pulled from Right groin. Pressure held for 10 mins on bilateral sides, no bleeding or hematoma. Quik clot and Tegaderm dressing applied bilateral. Dressing clean, dry, and intact. Pt education given on the need to continue lying flat. Will continue to monitor.

## 2021-06-29 NOTE — Clinical Note
Transseptal Cath Performed under hemodynamic and ICE and Fluoro, utilizing a extra sharp needle, Erath 71cm via a guiding sheath. Needle inserted.

## 2021-06-29 NOTE — ANESTHESIA PREPROCEDURE EVALUATION
Relevant Problems   No relevant active problems       Anesthetic History   No history of anesthetic complications            Review of Systems / Medical History  Patient summary reviewed, nursing notes reviewed and pertinent labs reviewed    Pulmonary          Shortness of breath         Neuro/Psych   Within defined limits           Cardiovascular    Hypertension        Dysrhythmias : SVT and atrial fibrillation        Comments: 09/2020 ECHO  ESTIMATED EJECTION FRACTION OF 55% BY VISUAL ASSESSMENT.    GI/Hepatic/Renal  Within defined limits              Endo/Other        Morbid obesity and anemia     Other Findings              Physical Exam    Airway  Mallampati: III  TM Distance: 4 - 6 cm  Neck ROM: normal range of motion   Mouth opening: Normal     Cardiovascular    Rhythm: regular           Dental    Dentition: Lower dentition intact and Upper dentition intact     Pulmonary  Breath sounds clear to auscultation               Abdominal  GI exam deferred       Other Findings            Anesthetic Plan    ASA: 3  Anesthesia type: general    Monitoring Plan: Arterial line        Anesthetic plan and risks discussed with: Patient

## 2021-06-29 NOTE — ANESTHESIA PROCEDURE NOTES
Arterial Line Placement    Start time: 6/29/2021 8:20 AM  End time: 6/29/2021 8:29 AM  Performed by: Radha Heath MD  Authorized by: Radha Heath MD     Pre-Procedure  Indications:  Blood sampling and arterial pressure monitoring  Preanesthetic Checklist: patient identified, risks and benefits discussed, anesthesia consent, site marked, patient being monitored, timeout performed and patient being monitored    Timeout Time: 08:20 EDT        Procedure:   Prep:  Alcohol  Seldinger Technique?: Yes    Orientation:  Left  Location:  Radial artery  Catheter size:  20 G  Number of attempts:  1  Cont Cardiac Output Sensor: No      Assessment:   Post-procedure:  Line secured and sterile dressing applied  Patient Tolerance:  Patient tolerated the procedure well with no immediate complications

## 2021-06-29 NOTE — PROGRESS NOTES
TRANSFER - IN REPORT:    Verbal report received from [facility] (name) on Bernestine Morning  being received from EP Lab (unit) for routine post - op      Report consisted of patients Situation, Background, Assessment and   Recommendations(SBAR). Information from the following report(s) SBAR, Kardex, Procedure Summary, Intake/Output, MAR and Recent Results was reviewed with the receiving nurse. Opportunity for questions and clarification was provided.

## 2021-06-29 NOTE — ANESTHESIA POSTPROCEDURE EVALUATION
Procedure(s):  ABLATION A-FIB  W COMPLETE EP STUDY/EDITA prior  Lt Atrial Pace & Record During Ep Study  Intracardiac Echocardiogram.    general    Anesthesia Post Evaluation      Multimodal analgesia: multimodal analgesia used between 6 hours prior to anesthesia start to PACU discharge  Patient location during evaluation: PACU  Patient participation: complete - patient participated  Level of consciousness: awake and alert  Pain management: adequate  Airway patency: patent  Anesthetic complications: no  Cardiovascular status: acceptable and hemodynamically stable  Respiratory status: acceptable  Hydration status: acceptable  Post anesthesia nausea and vomiting:  controlled      INITIAL Post-op Vital signs:   Vitals Value Taken Time   /71 06/29/21 1235   Temp 36.8 °C (98.3 °F) 06/29/21 1100   Pulse 68 06/29/21 1238   Resp 30 06/29/21 1238   SpO2 91 % 06/29/21 1229   Vitals shown include unvalidated device data.

## 2021-06-29 NOTE — Clinical Note
Requested Prescriptions     Pending Prescriptions Disp Refills    hydroCHLOROthiazide (HYDRODIURIL) 12.5 mg tablet 30 Tab 5     Sig: Take 1 Tab by mouth daily. Requested by pharmacy. Sheath #2: Sheath: inserted. Sheath inserted/placed in the right femoral  vein.

## 2021-06-29 NOTE — Clinical Note
positioned in the [address] [place] [address] Rd. Lowest Cyro Temp: -49 C. Total ablation time: 180 sec. LSPV  Isolated.

## 2021-06-29 NOTE — PROGRESS NOTES
1550 TRANSFER - OUT REPORT:    Verbal report given to TOPHER RN (name) on Monico Talavera  being transferred to [facility]TSD (unit) for routine progression of care       Report consisted of patients Situation, Background, Assessment and   Recommendations(SBAR). Information from the following report(s) SBAR, Kardex, Procedure Summary, Intake/Output, MAR and Recent Results was reviewed with the receiving nurse. Opportunity for questions and clarification was provided.       Patient transported with:   Registered Nurse

## 2021-06-29 NOTE — PROGRESS NOTES
1550: TRANSFER - IN REPORT:    [facility]uro  being received from [facility] for routine progression of care      Report consisted of patients Situation, Background, Assessment and   Recommendations(SBAR). Information from the following report(s) SBAR, Kardex, [facility] and Cardiac Rhythm NSR/ AFIB was reviewed with the receiving nurse. Opportunity for questions and clarification was provided. Assessment completed upon patients arrival to unit and care assumed. 1900: Bedside and Verbal shift change report given to JAZMINE Ramon (oncoming nurse) by Opal Flores (offgoing nurse). Report included the following information SBAR, Kardex, MAR and Cardiac Rhythm NSR/AFIB.

## 2021-06-29 NOTE — Clinical Note
TRANSFER - OUT REPORT:     [facility]. Report consisted of patient's Situation, Background, Assessment and   Recommendations(SBAR). Opportunity for questions and clarification was provided.

## 2021-06-30 VITALS
HEIGHT: 62 IN | DIASTOLIC BLOOD PRESSURE: 87 MMHG | WEIGHT: 253 LBS | RESPIRATION RATE: 18 BRPM | TEMPERATURE: 98.7 F | HEART RATE: 70 BPM | OXYGEN SATURATION: 96 % | BODY MASS INDEX: 46.56 KG/M2 | SYSTOLIC BLOOD PRESSURE: 98 MMHG

## 2021-06-30 PROCEDURE — 74011250637 HC RX REV CODE- 250/637: Performed by: INTERNAL MEDICINE

## 2021-06-30 PROCEDURE — 93005 ELECTROCARDIOGRAM TRACING: CPT

## 2021-06-30 PROCEDURE — 99218 HC RM OBSERVATION: CPT

## 2021-06-30 PROCEDURE — 99217 PR OBSERVATION CARE DISCHARGE MANAGEMENT: CPT | Performed by: INTERNAL MEDICINE

## 2021-06-30 RX ORDER — AMLODIPINE BESYLATE 10 MG/1
5 TABLET ORAL DAILY
Qty: 30 TABLET | Refills: 1 | Status: SHIPPED | OUTPATIENT
Start: 2021-07-01

## 2021-06-30 RX ORDER — PANTOPRAZOLE SODIUM 20 MG/1
20 TABLET, DELAYED RELEASE ORAL
Qty: 30 TABLET | Refills: 2 | Status: SHIPPED | OUTPATIENT
Start: 2021-07-01 | End: 2021-12-21 | Stop reason: SDUPTHER

## 2021-06-30 RX ORDER — FLECAINIDE ACETATE 50 MG/1
50 TABLET ORAL 2 TIMES DAILY
Qty: 60 TABLET | Refills: 3 | Status: SHIPPED | OUTPATIENT
Start: 2021-06-30 | End: 2021-08-12 | Stop reason: SDUPTHER

## 2021-06-30 RX ORDER — METOPROLOL TARTRATE 50 MG/1
50 TABLET ORAL 2 TIMES DAILY
Qty: 60 TABLET | Refills: 3 | Status: SHIPPED | OUTPATIENT
Start: 2021-06-30 | End: 2022-02-10

## 2021-06-30 RX ADMIN — ACETAMINOPHEN 325 MG: 325 TABLET ORAL at 08:49

## 2021-06-30 RX ADMIN — FLECAINIDE ACETATE 50 MG: 100 TABLET ORAL at 08:42

## 2021-06-30 RX ADMIN — AMLODIPINE BESYLATE 10 MG: 10 TABLET ORAL at 08:42

## 2021-06-30 RX ADMIN — APIXABAN 5 MG: 5 TABLET, FILM COATED ORAL at 01:18

## 2021-06-30 RX ADMIN — PANTOPRAZOLE SODIUM 20 MG: 20 TABLET, DELAYED RELEASE ORAL at 08:42

## 2021-06-30 NOTE — PROGRESS NOTES
Problem: Falls - Risk of  Goal: *Absence of Falls  Description: Document Mikael Whalen Fall Risk and appropriate interventions in the flowsheet. Outcome: Progressing Towards Goal  Note: Fall Risk Interventions:            Medication Interventions: Assess postural VS orthostatic hypotension, Evaluate medications/consider consulting pharmacy, Patient to call before getting OOB, Teach patient to arise slowly                   Problem: Patient Education: Go to Patient Education Activity  Goal: Patient/Family Education  Outcome: Progressing Towards Goal     Problem:  Moderate Sedation (Adult)  Goal: *Patent airway  Outcome: Progressing Towards Goal  Goal: *Adequate oxygenation  Outcome: Progressing Towards Goal  Goal: *Absence of aspiration  Outcome: Progressing Towards Goal  Goal: *Hemodynamically stable  Outcome: Progressing Towards Goal  Goal: *Optimal pain control at patient's stated goal  Outcome: Progressing Towards Goal  Goal: *Absence of nausea/vomiting  Outcome: Progressing Towards Goal  Goal: *Anxiety reduced or absent  Outcome: Progressing Towards Goal  Goal: *Absence of injury  Outcome: Progressing Towards Goal  Goal: *Level of consciousness returns to baseline  Outcome: Progressing Towards Goal  Goal: Interventions  Outcome: Progressing Towards Goal     Problem: Patient Education: Go to Patient Education Activity  Goal: Patient/Family Education  Outcome: Progressing Towards Goal

## 2021-06-30 NOTE — DISCHARGE SUMMARY
Cardiology Discharge Summary      Discharge Summary     Patient: Adele Malone MRN: [id number]  SSN: [id number]    YOB: 1960  Age: 63 y.o. Sex: female       Admit Date: 6/29/2021    Discharge Date: 6/30/2021      Admission Diagnoses: Afib ([address] [I48.91]  Tachycardia [R00.0]  [facility]) [I48.91]    Discharge Diagnoses:   Problem List as of 6/30/2021 Date Reviewed: 6/2/2021        Codes Class Noted - Resolved    [facility]) ICD-10-CM: I48.91  ICD-9-CM: 427.31  6/29/2021 - Present        Cardiac device in situ ICD-10-CM: Z95.9  ICD-9-CM: V45.00  10/28/2020 - Present    Overview Signed 10/28/2020  8:12 AM by Lara Camargo MD     Medtronic loop implant 10/28/20             Severe obesity (BMI 35.0-39. 9) ICD-10-CM: E66.01  ICD-9-CM: 278.01  7/12/2018 - Present        Incontinence of urine ICD-10-CM: R32  ICD-9-CM: 788.30  4/6/2018 - Present        New onset atrial fibrillation [facility]) ICD-10-CM: I48.91  ICD-9-CM: 427.31  4/6/2018 - Present        Incontinent of urine ICD-10-CM: R32  ICD-9-CM: 788.30  4/6/2018 - Present               Discharge Condition: Good    Hospital Course: Patient presented for elective EDITA with afib ablation/mapping of left atrial tachycardia. A EDITA was done prior to her successful atrial fibrillation ablation pulmonary vein isolation using [employer] Cryoballoon catheter, no complications. Her groin site is without hematoma. Femoral and distal pulses in tact. She remains in Sinus rhythm. She will be discharged on Eliquis, lopressor, Flecainide and protonix. Instructed to follow up with Dr. Promise Ferro in 4-6 weeks. Consults: None    Significant Diagnostic Studies: EDITA  · LV: Estimated LVEF is 55 - 60%. Visually measured ejection fraction. Normal cavity size and systolic function (ejection fraction normal). · MV: Mild mitral valve regurgitation is present. · IAS: Agitated saline contrast study was performed. There was no shunting at baseline or with Valsalva.   · No left atrial appendage thrombus. PROCEDURE   -Atrial fibrillation pulmonary vein isolation ablation using [employer].  -Cardiac electrophysiology study.   -Trans-esophageal echocardiogram prior to ablation without left atrial appendage thrombus, please see separate full report. -Transseptal puncture x 1.  -Intracardiac echocardiography.   -Continuous arterial monitoring with placement of left radial arterial sheath by anesthesia. -Pacing and sensing from the right atrium and left atrium via the coronary sinus.   -Placement of coronary sinus and His catheters. -General endotracheal intubation with anesthesia. -Continuous esophageal temperature monitoring.   -Phrenic nerve pacing during isolation of the right pulmonary veins. Disposition: home    Discharge Medications:      My Medications      START taking these medications      Instructions Each Dose to Equal Morning Noon Evening Bedtime   flecainide 50 mg tablet  Commonly known as: TAMBOCOR    Your last dose was: Your next dose is: Take 1 Tablet by mouth two (2) times a day. 50 mg                 pantoprazole 20 mg tablet  Commonly known as: PROTONIX  Start taking on: July 1, 2021  Replaces: omeprazole 20 mg capsule    Your last dose was: Your next dose is: Take 1 Tablet by mouth Daily (before breakfast). 20 mg                    CHANGE how you take these medications      Instructions Each Dose to Equal Morning Noon Evening Bedtime   amLODIPine 10 mg tablet  Commonly known as: NORVASC  Start taking on: July 1, 2021  What changed: how much to take    Your last dose was: Your next dose is: Take 0.5 Tablets by mouth daily. 5 mg                 * apixaban 5 mg tablet  Commonly known as: Eliquis  What changed: Another medication with the same name was added. Make sure you understand how and when to take each. Your last dose was:      Your next dose is:         TAKE 1 TABLET BY MOUTH TWO TIMES A DAY                  * apixaban 5 mg tablet  Commonly known as: ELIQUIS  What changed: You were already taking a medication with the same name, and this prescription was added. Make sure you understand how and when to take each. Your last dose was: Your next dose is: Take 1 Tablet by mouth every twelve (12) hours. 5 mg                 metoprolol tartrate 50 mg tablet  Commonly known as: LOPRESSOR  What changed:   · medication strength  · additional instructions    Your last dose was: Your next dose is: Take 1 Tablet by mouth two (2) times a day. 50 mg                     * This list has 2 medication(s) that are the same as other medications prescribed for you. Read the directions carefully, and ask your doctor or other care provider to review them with you. STOP taking these medications    omeprazole 20 mg capsule  Commonly known as: PRILOSEC  Replaced by: pantoprazole 20 mg tablet           ASK your doctor about these medications      Instructions Each Dose to Equal Morning Noon Evening Bedtime   cyclobenzaprine 10 mg tablet  Commonly known as: FLEXERIL    Your last dose was: Your next dose is: Take  by mouth three (3) times daily as needed for Muscle Spasm(s). diclofenac EC 75 mg EC tablet  Commonly known as: VOLTAREN    Your last dose was: Your next dose is: Take 75 mg by mouth two (2) times a day. 75 mg                 meclizine 25 mg tablet  Commonly known as: ANTIVERT    Your last dose was: Your next dose is: Take  by mouth three (3) times daily as needed. multivitamin tablet  Commonly known as: ONE A DAY    Your last dose was: Your next dose is: Take 1 Tab by mouth daily. 1 Tablet                 TylenoL 325 mg tablet  Generic drug: acetaminophen    Your last dose was: Your next dose is: Take  by mouth every four (4) hours as needed for Pain.                   Vitamin B-12 100 mcg tablet  Generic drug: cyanocobalamin    Your last dose was: Your next dose is: Take 100 mcg by mouth daily. 100 mcg                 Vitamin D3 50 mcg (2,000 unit) Tab  Generic drug: cholecalciferol (vitamin D3)    Your last dose was: Your next dose is: Take  by mouth daily. Where to Get Your Medications      These medications were sent to [facility] [address] [facility], [address] [place] [address] Drive, [address]  [address]    Phone: [phone]   · amLODIPine 10 mg tablet  · apixaban 5 mg tablet  · flecainide 50 mg tablet  · metoprolol tartrate 50 mg tablet  · pantoprazole 20 mg tablet           I saw, evaluated, interviewed and examined the patient personally. I agree with the findings and plan of care as documented below with KAYLYN note  Patient denies any chest pain or chest tightness this morning. She denies any palpitation. She would like to go home  Hemodynamically stable. Vitals reviewed. Exam suggest no fluid overload. Right groin access site without any complication  Labs reviewed. Pertinent data reviewed  Continue with apixaban, metoprolol. Flecainide will be started as 50 mg twice daily  Patient will see Dr. Wu Hardy in 4 weeks  EKG was done this morning and QT intervals 460 ms    Mg Pardo MD       Activity: Activity as tolerated  Diet: Regular Diet  Wound Care: As directed    No orders of the defined types were placed in this encounter.       Signed By: Caridad Abdullahi PA-C     June 30, 2021

## 2021-06-30 NOTE — PROGRESS NOTES
Discharge order noted for today. Orders reviewed. Observation notice provided in writing to patient and/or caregiver as well as verbal explanation of the policy. Patients who are in outpatient status also receive the Observation notice. Pt also provided with a financial assistance application. No additional needs identified at this time.  remains available if needed. Pt to be transported home by [facility].     Christina Jimenez RN BSN  Care Manager  [phone]

## 2021-06-30 NOTE — PROGRESS NOTES
0700: Bedside and Verbal shift change report given to JAZMINE Rodriguez (oncoming nurse) by Camryn Peterson RN (offgoing nurse). Report included the following information SBAR, Kardex, [facility] and Cardiac Rhythm NSR.     0850: Held metoprolol 50 mg per Dr Erwin Turner for low BP 98/87.    1105: I have reviewed discharge instructions with the patient. The patient verbalized understanding. Current Discharge Medication List      START taking these medications    Details   pantoprazole (PROTONIX) 20 mg tablet Take 1 Tablet by mouth Daily (before breakfast). Qty: 30 Tablet, Refills: 2  Start date: 7/1/2021      flecainide (TAMBOCOR) 50 mg tablet Take 1 Tablet by mouth two (2) times a day. Qty: 60 Tablet, Refills: 3  Start date: 6/30/2021      !! apixaban (ELIQUIS) 5 mg tablet Take 1 Tablet by mouth every twelve (12) hours. Qty: 60 Tablet, Refills: 3  Start date: 6/30/2021       !! - Potential duplicate medications found. Please discuss with provider. CONTINUE these medications which have CHANGED    Details   metoprolol tartrate (LOPRESSOR) 50 mg tablet Take 1 Tablet by mouth two (2) times a day. Qty: 60 Tablet, Refills: 3  Start date: 6/30/2021      amLODIPine (NORVASC) 10 mg tablet Take 0.5 Tablets by mouth daily. Qty: 30 Tablet, Refills: 1  Start date: 7/1/2021         CONTINUE these medications which have NOT CHANGED    Details   !! apixaban (Eliquis) 5 mg tablet TAKE 1 TABLET BY MOUTH TWO TIMES A DAY  Qty: 180 Tab, Refills: 3       !! - Potential duplicate medications found. Please discuss with provider. STOP taking these medications       omeprazole (PRILOSEC) 20 mg capsule Comments:   Reason for Stopping:           Discharge plan of care/case management plan validated with provider discharge order.

## 2021-06-30 NOTE — PROGRESS NOTES
Problem: Falls - Risk of  Goal: *Absence of Falls  Description: Document Mary Pelayo Fall Risk and appropriate interventions in the flowsheet. 6/30/2021 1120 by Arely Redd RN  Outcome: Resolved/Met  6/30/2021 0901 by Arely Redd RN  Outcome: Progressing Towards Goal  Note: Fall Risk Interventions:            Medication Interventions: Assess postural VS orthostatic hypotension, Evaluate medications/consider consulting pharmacy, Patient to call before getting OOB, Teach patient to arise slowly                   Problem: Patient Education: Go to Patient Education Activity  Goal: Patient/Family Education  6/30/2021 1120 by Arely Redd RN  Outcome: Resolved/Met  6/30/2021 0901 by Arely Redd RN  Outcome: Progressing Towards Goal     Problem:  Moderate Sedation (Adult)  Goal: *Patent airway  6/30/2021 1120 by Arely Redd RN  Outcome: Resolved/Met  6/30/2021 0901 by Arely Redd RN  Outcome: Progressing Towards Goal  Goal: *Adequate oxygenation  6/30/2021 1120 by Arely Redd RN  Outcome: Resolved/Met  6/30/2021 0901 by Arely Redd RN  Outcome: Progressing Towards Goal  Goal: *Absence of aspiration  6/30/2021 1120 by Arely Redd RN  Outcome: Resolved/Met  6/30/2021 0901 by Arely Redd RN  Outcome: Progressing Towards Goal  Goal: *Hemodynamically stable  6/30/2021 1120 by Arely Redd RN  Outcome: Resolved/Met  6/30/2021 0901 by Arely Redd RN  Outcome: Progressing Towards Goal  Goal: *Optimal pain control at patient's stated goal  6/30/2021 1120 by Arely Redd RN  Outcome: Resolved/Met  6/30/2021 0901 by Arely Redd RN  Outcome: Progressing Towards Goal  Goal: *Absence of nausea/vomiting  6/30/2021 1120 by Arely eRdd RN  Outcome: Resolved/Met  6/30/2021 0901 by Arely Redd RN  Outcome: Progressing Towards Goal  Goal: *Anxiety reduced or absent  6/30/2021 1120 by Arely Redd RN  Outcome: Resolved/Met  6/30/2021 0901 by Arely Redd RN  Outcome: Progressing Towards Goal  Goal: *Absence of injury  6/30/2021 1120 by Binh Peralta RN  Outcome: Resolved/Met  6/30/2021 0901 by Binh Peralta RN  Outcome: Progressing Towards Goal  Goal: *Level of consciousness returns to baseline  6/30/2021 1120 by Binh Peralta RN  Outcome: Resolved/Met  6/30/2021 0901 by Binh Peralta RN  Outcome: Progressing Towards Goal  Goal: Interventions  6/30/2021 1120 by Binh Peralta RN  Outcome: Resolved/Met  6/30/2021 0901 by Binh Peralta RN  Outcome: Progressing Towards Goal     Problem: Patient Education: Go to Patient Education Activity  Goal: Patient/Family Education  6/30/2021 1120 by Binh Peralta RN  Outcome: Resolved/Met  6/30/2021 0901 by Binh Peralta RN  Outcome: Progressing Towards Goal

## 2021-06-30 NOTE — PROGRESS NOTES
Patient remained in NSR throughout the night. Procedure site c/d/i, no hematoma, bleeding, or tenderness noted. No chest pain or sob overnight. Bedside and Verbal shift change report given to Jelena Meeks RN (oncoming nurse) by Court Harada, RN   (offgoing nurse). Report included the following information SBAR, Kardex, MAR and Recent Results.

## 2021-06-30 NOTE — DISCHARGE INSTRUCTIONS
Patient Education        Atrial Fibrillation: Care Instructions  Your Care Instructions     Atrial fibrillation is an irregular and often fast heartbeat. Treating this condition is important for several reasons. It can cause blood clots, which can travel from your heart to your brain and cause a stroke. If you have a fast heartbeat, you may feel lightheaded, dizzy, and weak. An irregular heartbeat can also increase your risk for heart failure. Atrial fibrillation is often the result of another heart condition, such as high blood pressure or coronary artery disease. Making changes to improve your heart condition will help you stay healthy and active. Follow-up care is a key part of your treatment and safety. Be sure to make and go to all appointments, and call your doctor if you are having problems. It's also a good idea to know your test results and keep a list of the medicines you take. How can you care for yourself at home? Medicines    · Take your medicines exactly as prescribed. Call your doctor if you think you are having a problem with your medicine. You will get more details on the specific medicines your doctor prescribes.     · If your doctor has given you a blood thinner to prevent a stroke, be sure you get instructions about how to take your medicine safely. Blood thinners can cause serious bleeding problems.     · Do not take any vitamins, over-the-counter drugs, or herbal products without talking to your doctor first.   Lifestyle changes    · Do not smoke. Smoking can increase your chance of a stroke and heart attack. If you need help quitting, talk to your doctor about stop-smoking programs and medicines. These can increase your chances of quitting for good.     · Eat a heart-healthy diet.     · Stay at a healthy weight. Lose weight if you need to.     · Limit alcohol to 2 drinks a day for men and 1 drink a day for women. Too much alcohol can cause health problems.     · Avoid colds and flu.  Get a pneumococcal vaccine shot. If you have had one before, ask your doctor whether you need another dose. Get a flu shot every year. If you must be around people with colds or flu, wash your hands often. Activity    · If your doctor recommends it, get more exercise. Walking is a good choice. Bit by bit, increase the amount you walk every day. Try for at least 30 minutes on most days of the week. You also may want to swim, bike, or do other activities. Your doctor may suggest that you join a cardiac rehabilitation program so that you can have help increasing your physical activity safely.     · Start light exercise if your doctor says it is okay. Even a small amount will help you get stronger, have more energy, and manage stress. Walking is an easy way to get exercise. Start out by walking a little more than you did in the hospital. Gradually increase the amount you walk.     · When you exercise, watch for signs that your heart is working too hard. You are pushing too hard if you cannot talk while you are exercising. If you become short of breath or dizzy or have chest pain, sit down and rest immediately.     · Check your pulse regularly. Place two fingers on the artery at the palm side of your wrist, in line with your thumb. If your heartbeat seems uneven or fast, talk to your doctor. When should you call for help? Call 911 anytime you think you may need emergency care. For example, call if:    · You have symptoms of a heart attack. These may include:  ? Chest pain or pressure, or a strange feeling in the chest.  ? Sweating. ? Shortness of breath. ? Nausea or vomiting. ? Pain, pressure, or a strange feeling in the back, neck, jaw, or upper belly or in one or both shoulders or arms. ? Lightheadedness or sudden weakness. ? A fast or irregular heartbeat. After you call 911, the  may tell you to chew 1 adult-strength or 2 to 4 low-dose aspirin. Wait for an ambulance.  Do not try to drive yourself.     · You have symptoms of a stroke. These may include:  ? Sudden numbness, tingling, weakness, or loss of movement in your face, arm, or leg, especially on only one side of your body. ? Sudden vision changes. ? Sudden trouble speaking. ? Sudden confusion or trouble understanding simple statements. ? Sudden problems with walking or balance. ? A sudden, severe headache that is different from past headaches.     · You passed out (lost consciousness). Call your doctor now or seek immediate medical care if:    · You have new or increased shortness of breath.     · You feel dizzy or lightheaded, or you feel like you may faint.     · Your heart rate becomes irregular.     · You can feel your heart flutter in your chest or skip heartbeats. Tell your doctor if these symptoms are new or worse. Watch closely for changes in your health, and be sure to contact your doctor if you have any problems. Where can you learn more? Go to http://leland-michael.info/  Enter U020 in the search box to learn more about \"Atrial Fibrillation: Care Instructions. \"  Current as of: August 31, 2020               Content Version: 12.8  © 7869-6580 [employer]. Care instructions adapted under license by [employer] (which disclaims liability or warranty for this information). If you have questions about a medical condition or this instruction, always ask your healthcare professional. [address] any warranty or liability for your use of this information. Patient Education        Learning About Catheter Ablation for Heart Rhythm Problems  What is catheter ablation? Catheter ablation is a procedure that treats heart rhythm problems. These problems include atrial fibrillation, supraventricular tachycardia (SVT), atrial flutter, and ventricular tachycardia. Your heart should have a strong, steady beat. That beat is controlled by the heart's electrical system.  Sometimes that system misfires. This causes a heartbeat that is too fast and isn't steady. Catheter ablation is a way to get into your heart and fix the problem. Ablation is not surgery. How is catheter ablation done? Your doctor inserts thin tubes called catheters into a blood vessel in your groin, arm, or neck. Then your doctor feeds them into the heart. Wires in the catheters help the doctor find the problem areas. Then the doctor uses the wires to send energy to destroy the tiny areas of heart tissue that are causing the problems. It may seem like a bad idea to destroy parts of your heart on purpose. But the areas that are destroyed are very tiny. They should not affect your heart's ability to do its job. You may be awake during the procedure. Or you may be asleep. The doctor will give you medicines to help you feel relaxed and to numb the areas where the catheters go in. You may feel a little uncomfortable, but you should not feel pain. What can you expect after catheter ablation? You may stay overnight in the hospital. How long you stay in the hospital depends on the type of ablation you have. Do not exercise hard or lift anything heavy for a week. You will probably be able to go back to work and to your normal routine in 1 or 2 days. You may have swelling, bruising, or a small lump around the site where the catheters went into your body. These should go away in 3 to 4 weeks. You may have to take some medicines for a while. Follow-up care is a key part of your treatment and safety. Be sure to make and go to all appointments, and call your doctor if you are having problems. It's also a good idea to know your test results and keep a list of the medicines you take. Where can you learn more? Go to http://www.gray.com/  Enter N533 in the search box to learn more about \"Learning About Catheter Ablation for Heart Rhythm Problems. \"  Current as of: August 31, 2020               Content Version: 12.8  © 9043-3928 [employer]. Care instructions adapted under license by [employer] (which disclaims liability or warranty for this information). If you have questions about a medical condition or this instruction, always ask your healthcare professional. [address] any warranty or liability for your use of this information. Patient Education        Learning About Rhythm-Control Medicines  Introduction    Rhythm-control medicines return your heart to a normal rhythm. And they keep it at a normal rhythm. They are also called antiarrhythmics. Doctors may use these medicines for many reasons. These reasons include:  · You have symptoms from a heart rhythm problem. · You had electric cardioversion. Or you will have it. · You had catheter ablation. · You tried medicine to control your heart rate. But it did not help. · You are at risk of having a dangerous heart rhythm. These medicines can have serious side effects. Examples  · Amiodarone (Pacerone)  · Dofetilide (Tikosyn)  · Propafenone (Rythmol)  · Sotalol (Betapace AF)  Possible side effects  Side effects depend on which medicine you take. One serious one is a very irregular heart rate. Read the information that comes with your medicine. What to know about taking this medicine  · You may be in the hospital when you start this medicine. Your doctor will watch what it does to your heart. · Be sure to ask your doctor any questions. You may want to know more about the pros and cons of the medicine. · Your doctor will check you often. He or she will make sure you are not having problems. Be sure to go to all of your visits. · You may need regular blood tests. These make sure you are taking the right amount of medicine. · Take your medicines exactly as prescribed. Call your doctor if you think you are having a problem with your medicine.   · Check with your doctor or pharmacist before you use any other medicines. This includes over-the-counter medicines. Make sure your doctor knows all of the medicines, vitamins, herbal products, and supplements you take. Taking some medicines together can cause problems. Where can you learn more? Go to http://www.gray.com/  Enter Q900 in the search box to learn more about \"Learning About Rhythm-Control Medicines. \"  Current as of: August 31, 2020               Content Version: 12.8  © 2006-2021 [employer]. Care instructions adapted under license by [employer] (which disclaims liability or warranty for this information). If you have questions about a medical condition or this instruction, always ask your healthcare professional. [address] any warranty or liability for your use of this information. Disposition:  When discharged to home will need follow-up in my office 4 weeks. Please call my office at [phone] if any questions or concerns. Restrictions:  No driving for at least 24 hours after surgery. No heavy lifting > 10 lbs or prolonged standing, walking, or running x 1 week. OK to shower today over incision and remove dressing. Monitor groin for any signs of bleeding and please contact office at [phone] if any issues. There may be some mild bruising which is not unusual.  If any new symptoms develop, such as chest pain, dyspnea, dizziness, please contact office at [phone] immediately. ABLATION DISCHARGE INSTRUCTIONS    It is normal to feel tired the first couple days. Take it easy and follow the physicians instructions. CHECK THE CATHETER INSERTION SITE DAILY:  You may shower 24 hours after the procedure, remove the bandage during showering. Wash with soap and water and pat dry. Gentle cleaning of the site with soap and water is sufficient, cover with a dry clean dressing or bandage. Do not apply creams or powders to the area.   Do not sit in a bathtub or pool of water for 7 days or until wound has completely healed. Temporary bruising and discomfort is normal and may last a few weeks. You may have a  formation of a small lump at the site which may last up to 6 weeks. CALL THE PHYSICIAN:  If the site becomes red, swollen or feels warm to the touch  If there is bleeding or drainage or if there is unusual pain at the groin or down the leg. If there is any bleeding, lie down, apply pressure or have someone apply pressure with a clean cloth until the bleeding stops. If the bleeding continues, call 911 to be transported to the hospital.  DO NOT DRIVE YOURSELF, [address]. Activity:      For the first 24-48 hours or as instructed by the physician:  No lifting, pushing or pulling over 10 pounds and no straining the insertion site. Do not life grocery bags or the garbage can, do not run the vacuum  or  for 7 days. Start with short walks as in the hospital and gradually increase as tolerated each day. It is recommended to walk 30 minutes 5-7 days per week. Follow your physicians instructions on activity. Avoid walking outside in extremes of heat or cold. Walk inside when it is cold and windy or hot and humid. Things to keep in mind:  No driving for at least 24 hours, or as designated by your physician. Limit the number of times you go up and down the stairs  Take rests and pace yourself with activity. Be careful and do not strain with bowel movements. Medications: Take all medications as prescribed  Call your physician if you have any questions  Keep an updated list of your medications with you at all times and give a list to your physician and pharmacist    Signs and Symptoms:  Be cautious of symptoms of angina or recurrent symptoms such as chest discomfort, unusual shortness of breath or fatigue, palpitations. After Care: Follow up with your physician as instructed.   Follow a heart healthy diet with proper portion control, daily stress management, daily exercise, blood pressure and cholesterol control , and smoking cessation.

## 2021-07-01 LAB
ATRIAL RATE: 70 BPM
CALCULATED P AXIS, ECG09: 72 DEGREES
CALCULATED R AXIS, ECG10: 14 DEGREES
CALCULATED T AXIS, ECG11: 47 DEGREES
DIAGNOSIS, 93000: NORMAL
P-R INTERVAL, ECG05: 198 MS
Q-T INTERVAL, ECG07: 426 MS
QRS DURATION, ECG06: 102 MS
QTC CALCULATION (BEZET), ECG08: 460 MS
VENTRICULAR RATE, ECG03: 70 BPM

## 2021-07-01 NOTE — PROGRESS NOTES
I have personally seen and evaluated the device findings. Interrogation reviewed and I agree with assessment.     Andres Hickey

## 2021-07-02 LAB
ATRIAL RATE: 83 BPM
CALCULATED P AXIS, ECG09: 79 DEGREES
CALCULATED R AXIS, ECG10: 21 DEGREES
CALCULATED T AXIS, ECG11: 20 DEGREES
DIAGNOSIS, 93000: NORMAL
P-R INTERVAL, ECG05: 162 MS
Q-T INTERVAL, ECG07: 428 MS
QRS DURATION, ECG06: 96 MS
QTC CALCULATION (BEZET), ECG08: 484 MS
VENTRICULAR RATE, ECG03: 77 BPM

## 2021-07-28 ENCOUNTER — DOCUMENTATION ONLY (OUTPATIENT)
Dept: CARDIOLOGY CLINIC | Age: 61
End: 2021-07-28

## 2021-07-28 NOTE — PROGRESS NOTES
Job Accommodation Questionnaire forms completed and signed by Dr. Dmitry Silverman. Faxed and confirmed.

## 2021-08-03 PROBLEM — I48.91 A-FIB (HCC): Status: RESOLVED | Noted: 2021-06-29 | Resolved: 2021-08-03

## 2021-08-12 ENCOUNTER — OFFICE VISIT (OUTPATIENT)
Dept: CARDIOLOGY CLINIC | Age: 61
End: 2021-08-12
Payer: COMMERCIAL

## 2021-08-12 ENCOUNTER — CLINICAL SUPPORT (OUTPATIENT)
Dept: CARDIOLOGY CLINIC | Age: 61
End: 2021-08-12

## 2021-08-12 VITALS
DIASTOLIC BLOOD PRESSURE: 86 MMHG | SYSTOLIC BLOOD PRESSURE: 128 MMHG | OXYGEN SATURATION: 98 % | HEART RATE: 57 BPM | WEIGHT: 244 LBS | HEIGHT: 62 IN | BODY MASS INDEX: 44.9 KG/M2

## 2021-08-12 DIAGNOSIS — Z95.818 STATUS POST PLACEMENT OF IMPLANTABLE LOOP RECORDER: ICD-10-CM

## 2021-08-12 DIAGNOSIS — I47.1 ATRIAL TACHYCARDIA (HCC): ICD-10-CM

## 2021-08-12 DIAGNOSIS — Z95.9 CARDIAC DEVICE IN SITU: ICD-10-CM

## 2021-08-12 DIAGNOSIS — R53.83 FATIGUE, UNSPECIFIED TYPE: ICD-10-CM

## 2021-08-12 DIAGNOSIS — I47.1 SVT (SUPRAVENTRICULAR TACHYCARDIA) (HCC): Primary | ICD-10-CM

## 2021-08-12 DIAGNOSIS — Z98.890 H/O CARDIAC RADIOFREQUENCY ABLATION: ICD-10-CM

## 2021-08-12 DIAGNOSIS — Z79.01 ON APIXABAN THERAPY: Primary | ICD-10-CM

## 2021-08-12 DIAGNOSIS — I48.0 PAROXYSMAL ATRIAL FIBRILLATION (HCC): ICD-10-CM

## 2021-08-12 DIAGNOSIS — I47.1 SVT (SUPRAVENTRICULAR TACHYCARDIA) (HCC): ICD-10-CM

## 2021-08-12 DIAGNOSIS — I10 ESSENTIAL HYPERTENSION: ICD-10-CM

## 2021-08-12 PROCEDURE — 93000 ELECTROCARDIOGRAM COMPLETE: CPT | Performed by: INTERNAL MEDICINE

## 2021-08-12 PROCEDURE — 93291 INTERROG DEV EVAL SCRMS IP: CPT | Performed by: INTERNAL MEDICINE

## 2021-08-12 PROCEDURE — 99214 OFFICE O/P EST MOD 30 MIN: CPT | Performed by: INTERNAL MEDICINE

## 2021-08-12 RX ORDER — FLECAINIDE ACETATE 50 MG/1
50 TABLET ORAL 2 TIMES DAILY
Qty: 60 TABLET | Refills: 11 | Status: SHIPPED | OUTPATIENT
Start: 2021-08-12 | End: 2022-09-01 | Stop reason: SDUPTHER

## 2021-08-12 NOTE — PROGRESS NOTES
Yuki Messer presents today for   Chief Complaint   Patient presents with    Follow-up     4-6 week follow up after procedure    [facility] preferred language for health care discussion is english/other. Is someone accompanying this pt? no    Is the patient using any DME equipment during [address]? no    Depression Screening:  3 most recent PHQ Screens 8/12/2021   Little interest or pleasure in doing things Not at all   Feeling down, depressed, irritable, or hopeless Not at all   Total Score PHQ 2 0       Learning Assessment:  Learning Assessment 8/12/2021   PRIMARY LEARNER Patient   PRIMARY LANGUAGE ENGLISH   LEARNER PREFERENCE PRIMARY DEMONSTRATION   ANSWERED BY patient   RELATIONSHIP SELF       Abuse Screening:  Abuse Screening Questionnaire 8/12/2021   Do you ever feel afraid of your partner? N   Are you in a relationship with someone who physically or mentally threatens you? N   Is it safe for you to go home? Y       Fall Risk  Fall Risk Assessment, last 12 mths 2/3/2021   Able to walk? Yes   Fall in past 12 months? 0   Do you feel unsteady? 0   Are you worried about falling 0           Pt currently taking Anticoagulant therapy? Eliquis 5 mg twice a day    Pt currently taking Antiplatelet therapy ? no      Coordination of Care:  1. Have you been to the ER, urgent care clinic since your last visit? Hospitalized since your last visit? no    2. Have you seen or consulted any other health care providers outside of the [address] [place] [address] since your last visit? Include any pap smears or colon screening.  no

## 2021-08-12 NOTE — PROGRESS NOTES
History of Present Illness:  63year old female here for follow up. She underwent EP study 04/26/21, found to have atrial fibrillation, left sided, near the pulmonary veins. We increased her Lopressor. She was already taking Eliquis. She was having ongoing palpitations and she underwent pulmonary vein isolation 06/29/21. She continues to complain of fatigue, shortness of breath and some diaphoresis, but no palpitations. Impression:  1. Ongoing fatigue, unclear etiology, multiple possible causes. 2. Paroxysmal atrial fibrillation, on Eliquis, with pulmonary vein isolation 06/29/21. 3. Focal atrial tachycardia by EP study 04/26/21. No evidence of atrial flutter, AVNRT or accessory pathway. 4. Subcutaneous loop recorder placed October, 2020 at [facility]. 5. History of previous anemia. 6. History of sick sinus syndrome, limiting treatment with beta blocker, but on profound bradycardic events or pauses by her monitor today. 7. Hypertension, controlled. 8. Spinal stenosis. 9. Echocardiogram September, 2020 with EF 55%. Plan:  Since her atrial fibrillation ablation, she has not had palpitations, but she continues to have some fatigue and shortness of breath, unclear etiology. There is a concern she may have sleep apnea and I have deferred to Dr. Lea Song any further workup. She seems to be tolerating beta blocker at this point without any significant pauses or events on her monitor. I will see back in five to six months. If she is still having symptoms, I may change her programming on her monitor. Past Medical History:   Diagnosis Date    Arthritis     bl knees     Atrial fibrillation (HCC)     Essential hypertension        Current Outpatient Medications   Medication Sig Dispense Refill    metoprolol tartrate (LOPRESSOR) 50 mg tablet Take 1 Tablet by mouth two (2) times a day. 60 Tablet 3    pantoprazole (PROTONIX) 20 mg tablet Take 1 Tablet by mouth Daily (before breakfast).  30 Tablet 2    flecainide (TAMBOCOR) 50 mg tablet Take 1 Tablet by mouth two (2) times a day. 60 Tablet 3    apixaban (ELIQUIS) 5 mg tablet Take 1 Tablet by mouth every twelve (12) hours. 60 Tablet 3    amLODIPine (NORVASC) 10 mg tablet Take 0.5 Tablets by mouth daily. 30 Tablet 1    apixaban (Eliquis) 5 mg tablet TAKE 1 TABLET BY MOUTH TWO TIMES A  Tab 3    cyanocobalamin (Vitamin B-12) 100 mcg tablet Take 100 mcg by mouth daily.  multivitamin (ONE A DAY) tablet Take 1 Tab by mouth daily.  cyclobenzaprine (FLEXERIL) 10 mg tablet Take  by mouth three (3) times daily as needed for Muscle Spasm(s).  acetaminophen (TylenoL) 325 mg tablet Take  by mouth every four (4) hours as needed for Pain.  diclofenac EC (VOLTAREN) 75 mg EC tablet Take 75 mg by mouth two (2) times a day.  cholecalciferol, vitamin D3, (VITAMIN D3) 2,000 unit tab Take  by mouth daily.  meclizine (ANTIVERT) 25 mg tablet Take  by mouth three (3) times daily as needed. Social History   reports that she has never smoked. She has never used smokeless tobacco.   reports no history of alcohol use. Family History  family history includes Cancer in her father and mother; Diabetes in her mother; Heart Attack in her brother. Review of Systems  Except as stated above include:  Constitutional: Negative for fever, chills and malaise/fatigue. HEENT: No congestion or recent URI. Gastrointestinal: No nausea, vomiting, abdominal pain, bloody stools. Pulmonary:  Negative except as stated above. Cardiac:  Negative except as stated above. Musculoskeletal: Negative except as stated above. Neurological:  No localized symptoms. Skin:  Negative except as stated above. Psych:  Negative except as stated above. Endocrine:  Negative except as stated above.     PHYSICAL EXAM  BP Readings from Last 3 Encounters:   06/30/21 98/87   06/29/21 (!) 147/79   06/02/21 124/86     Pulse Readings from Last 3 Encounters:   06/30/21 70 06/02/21 66   05/19/21 70     Wt Readings from Last 3 Encounters:   06/30/21 114.8 kg (253 lb)   06/29/21 108 kg (238 lb)   06/02/21 108 kg (238 lb)     General:   Well developed, well groomed. Head/Neck:   No obvious jugular venous distention     No obvious carotid pulsations. No evidence of xanthelasma. Lungs:   No respiratory distress. Clear bilaterally. Heart:  Regular rate and rhythm. Normal S1/S2. Palpation grossly normal.    No significant murmurs, rubs or gallops. Abdomen:   Non-acute abdomen. No obvious pulsations. Extremities:   Intact peripheral pulses. No significant edema. Neurological:   Alert and oriented to person, place, time. No focal neurological deficit visually. Skin:   No obvious rash    Blood Pressure Metric:  Monitor recommended and adjustments stated if needed.

## 2021-08-27 ENCOUNTER — TRANSCRIBE ORDER (OUTPATIENT)
Dept: SCHEDULING | Age: 61
End: 2021-08-27

## 2021-08-27 DIAGNOSIS — Z12.31 ENCOUNTER FOR SCREENING MAMMOGRAM FOR MALIGNANT NEOPLASM OF BREAST: Primary | ICD-10-CM

## 2021-08-27 NOTE — PROGRESS NOTES
I have personally seen and evaluated the device findings. Interrogation reviewed and I agree with assessment.     Ravi Conn

## 2021-09-14 ENCOUNTER — HOSPITAL ENCOUNTER (OUTPATIENT)
Dept: MAMMOGRAPHY | Age: 61
Discharge: HOME | End: 2021-09-14
Attending: INTERNAL MEDICINE
Payer: COMMERCIAL

## 2021-09-14 DIAGNOSIS — Z12.31 ENCOUNTER FOR SCREENING MAMMOGRAM FOR MALIGNANT NEOPLASM OF BREAST: ICD-10-CM

## 2021-09-14 PROCEDURE — 77063 BREAST TOMOSYNTHESIS BI: CPT

## 2021-12-21 RX ORDER — PANTOPRAZOLE SODIUM 20 MG/1
20 TABLET, DELAYED RELEASE ORAL
Qty: 30 TABLET | Refills: 3 | Status: SHIPPED | OUTPATIENT
Start: 2021-12-21

## 2022-02-10 ENCOUNTER — OFFICE VISIT (OUTPATIENT)
Dept: CARDIOLOGY CLINIC | Age: 62
End: 2022-02-10

## 2022-02-10 ENCOUNTER — CLINICAL SUPPORT (OUTPATIENT)
Dept: CARDIOLOGY CLINIC | Age: 62
End: 2022-02-10
Payer: COMMERCIAL

## 2022-02-10 VITALS
HEIGHT: 62 IN | BODY MASS INDEX: 44.53 KG/M2 | WEIGHT: 242 LBS | SYSTOLIC BLOOD PRESSURE: 120 MMHG | OXYGEN SATURATION: 99 % | DIASTOLIC BLOOD PRESSURE: 80 MMHG | HEART RATE: 57 BPM

## 2022-02-10 DIAGNOSIS — Z98.890 H/O CARDIAC RADIOFREQUENCY ABLATION: ICD-10-CM

## 2022-02-10 DIAGNOSIS — Z95.9 CARDIAC DEVICE IN SITU: Primary | ICD-10-CM

## 2022-02-10 DIAGNOSIS — Z79.01 ON APIXABAN THERAPY: ICD-10-CM

## 2022-02-10 DIAGNOSIS — I10 ESSENTIAL HYPERTENSION: ICD-10-CM

## 2022-02-10 DIAGNOSIS — Z95.9 CARDIAC DEVICE IN SITU: ICD-10-CM

## 2022-02-10 DIAGNOSIS — I47.1 SVT (SUPRAVENTRICULAR TACHYCARDIA) (HCC): ICD-10-CM

## 2022-02-10 DIAGNOSIS — I47.1 ATRIAL TACHYCARDIA (HCC): ICD-10-CM

## 2022-02-10 DIAGNOSIS — I47.1 SVT (SUPRAVENTRICULAR TACHYCARDIA) (HCC): Primary | ICD-10-CM

## 2022-02-10 PROBLEM — Z20.822 SUSPECTED COVID-19 VIRUS INFECTION: Status: ACTIVE | Noted: 2020-09-01

## 2022-02-10 PROBLEM — J96.01 ACUTE RESPIRATORY FAILURE WITH HYPOXIA (HCC): Status: ACTIVE | Noted: 2022-02-10

## 2022-02-10 PROBLEM — J12.82 PNEUMONIA DUE TO COVID-19 VIRUS: Status: ACTIVE | Noted: 2020-09-01

## 2022-02-10 PROBLEM — R09.02 HYPOXIA: Status: ACTIVE | Noted: 2020-09-01

## 2022-02-10 PROBLEM — U07.1 PNEUMONIA DUE TO COVID-19 VIRUS: Status: ACTIVE | Noted: 2020-09-01

## 2022-02-10 PROCEDURE — 99214 OFFICE O/P EST MOD 30 MIN: CPT | Performed by: INTERNAL MEDICINE

## 2022-02-10 PROCEDURE — 93291 INTERROG DEV EVAL SCRMS IP: CPT | Performed by: INTERNAL MEDICINE

## 2022-02-10 RX ORDER — METOPROLOL SUCCINATE 50 MG/1
50 TABLET, EXTENDED RELEASE ORAL DAILY
COMMUNITY
Start: 2021-12-16 | End: 2022-02-10

## 2022-02-10 RX ORDER — METOPROLOL TARTRATE 50 MG/1
50 TABLET ORAL 2 TIMES DAILY
COMMUNITY

## 2022-02-10 NOTE — PROGRESS NOTES
History of Present Illness:  63year old female here for follow up. She underwent EP study April, 2021, found to have atrial fibrillation. She underwent PVI in June, 2021. She had a subcutaneous loop recorder placed previously. She has been doing well without any new palpitations, but at this point she is describing some reflux, gas symptoms when she wakes up in the morning. She has not been seen by GI. She has also had recurrent hematuria once a month and she states it is not related to her menstrual cycle. She has had extensive urology evaluation and biopsy. Impression:  1. Paroxysmal a-fib, on Eliquis, with PVI June, 2021.  2. History of focal atrial tachycardia by EP study April, 2021, no evidence of atrial flutter, AVNRT, excessive pathway. 3. Subcutaneous loop recorder placed October, 2020 at [facility]. 4. History of anemia with hematuria by report and urology workup unremarkable. This appears to be a cyclical issue that she tells me, but not related to her menses. 5. History of sick sinus syndrome, limiting beta blocker treatment, but taking Flecainide. 6. HTN, controlled. 7. Spinal stenosis. 8. Echo 2020 with EF 55%. 9. BMI of 44. Plan:  She has continued hematuria and bleeding issues on Eliquis, but no etiology has been found. Her risk of stroke would be high if we stop the Eliquis, but when I see her back in six months, if there is no other cause for bleeding found, we can talk about switching to aspirin. I will continue with Flecainide for now. She also has some GI symptoms with gas in the morning and I recommended she discuss further with Dr. Brianna Diaz. All questions answered. Current Outpatient Medications   Medication Sig Dispense Refill    metoprolol tartrate (Lopressor) 50 mg tablet Take 50 mg by mouth two (2) times a day.  pantoprazole (PROTONIX) 20 mg tablet Take 1 Tablet by mouth Daily (before breakfast).  30 Tablet 3    apixaban (Eliquis) 5 mg tablet TAKE 1 TABLET BY MOUTH TWO TIMES A DAY 60 Tablet 11    flecainide (TAMBOCOR) 50 mg tablet Take 1 Tablet by mouth two (2) times a day. 60 Tablet 11    amLODIPine (NORVASC) 10 mg tablet Take 0.5 Tablets by mouth daily. 30 Tablet 1    cyanocobalamin (Vitamin B-12) 100 mcg tablet Take 100 mcg by mouth daily.  multivitamin (ONE A DAY) tablet Take 1 Tab by mouth daily.  cyclobenzaprine (FLEXERIL) 10 mg tablet Take  by mouth three (3) times daily as needed for Muscle Spasm(s).  acetaminophen (TylenoL) 325 mg tablet Take  by mouth every four (4) hours as needed for Pain.  diclofenac EC (VOLTAREN) 75 mg EC tablet Take 75 mg by mouth two (2) times a day.  cholecalciferol, vitamin D3, (VITAMIN D3) 2,000 unit tab Take  by mouth daily.  meclizine (ANTIVERT) 25 mg tablet Take  by mouth three (3) times daily as needed. Past Medical History:   Diagnosis Date    Arthritis     bl knees     Atrial fibrillation ([facility] [address]     Essential hypertension        Social History   reports that she has never smoked. She has never used smokeless tobacco.   reports no history of alcohol use. Family History  family history includes Cancer in her father and mother; Diabetes in her mother; Heart Attack in her brother. Review of Systems  Except as stated above include:  Constitutional: Negative for fever, chills and malaise/fatigue. HEENT: No congestion or recent URI. Gastrointestinal: No nausea, vomiting, abdominal pain, bloody stools. Pulmonary:  Negative except as stated above. Cardiac:  Negative except as stated above. Musculoskeletal: Negative except as stated above. Neurological:  No localized symptoms. Skin:  Negative except as stated above. Psych:  Negative except as stated above. Endocrine:  Negative except as stated above.     PHYSICAL EXAM  BP Readings from Last 3 Encounters:   02/10/22 120/80   08/12/21 128/86   06/30/21 98/87     Pulse Readings from Last 3 Encounters:   02/10/22 (!) 57   08/12/21 (!) 57   06/30/21 70     Wt Readings from Last 3 Encounters:   02/10/22 109.8 kg (242 lb)   08/12/21 110.7 kg (244 lb)   06/30/21 114.8 kg (253 lb)     General:   Well developed, well groomed. Head/Neck:   No obvious jugular venous distention     No obvious carotid pulsations. No evidence of xanthelasma. Lungs:   No respiratory distress. Clear bilaterally. Heart:  Regular rate and rhythm. Normal S1/S2. Palpation grossly normal.    No significant murmurs, rubs or gallops. Abdomen:   Non-acute abdomen. No obvious pulsations. Extremities:   Intact peripheral pulses. No significant edema. Neurological:   Alert and oriented to person, place, time. No focal neurological deficit visually. Skin:   No obvious rash    Blood Pressure Metric:  Monitor recommended and adjustments stated if needed.

## 2022-02-10 NOTE — PROGRESS NOTES
Hannah Cotton presents today for   Chief Complaint   Patient presents with    Follow-up     6 months        Hannah Cotton preferred language for health care discussion is english/other. Is someone accompanying this pt? no    Is the patient using any DME equipment during [address]? no    Depression Screening:  3 most recent PHQ Screens 2/10/2022   Little interest or pleasure in doing things Not at all   Feeling down, depressed, irritable, or hopeless Not at all   Total Score PHQ 2 0       Learning Assessment:  Learning Assessment 8/12/2021   PRIMARY LEARNER Patient   PRIMARY LANGUAGE ENGLISH   LEARNER PREFERENCE PRIMARY DEMONSTRATION   ANSWERED BY patient   RELATIONSHIP SELF       Abuse Screening:  Abuse Screening Questionnaire 8/12/2021   Do you ever feel afraid of your partner? N   Are you in a relationship with someone who physically or mentally threatens you? N   Is it safe for you to go home? Y       Fall Risk  Fall Risk Assessment, last 12 mths 2/3/2021   Able to walk? Yes   Fall in past 12 months? 0   Do you feel unsteady? 0   Are you worried about falling 0       Pt currently taking Anticoagulant therapy? Eliquis     Coordination of Care:  1. Have you been to the ER, urgent care clinic since your last visit? Hospitalized since your last visit? no    2. Have you seen or consulted any other health care providers outside of the [address] [place] [address] since your last visit? Include any pap smears or colon screening.  no

## 2022-02-11 NOTE — PROGRESS NOTES
I have personally seen and evaluated the device findings. Interrogation reviewed and I agree with assessment.     Davion Hill

## 2022-02-28 NOTE — ED NOTES
3:21 PM pt with new afib; frequent urination. No dysuria; loss of bladder control x2. Denies back pain. No longer rate controlled. Will MRI l/s. Unclear why loss of bladder control, no hx of ; no hx of trauma. actute thrombotic  event seems unlikely but is possible. MRI Noted; no surgical emergency;   D/w Dr Quintin Walters (sp?) agrees to consult     Pt with loss of bladder sensation and control; lost control of bowel.  Will admit; spine to consult; MRIl resulted    Jameel Jones MD
Patient assessment completed, repeat cardiac enzyme completed. Patient's bed change. Patient c/o chest pain. Provider Reggie Espinoza notified no additional wants or needs noted at this time.
Patient checked on and medication given at this time. Call bell within reach patient has no additional wants or needs at this time. Patient on monitor and VSS.
Patient is crying and screaming out that patient is having pain in her legs and arms she is hyperventilating. Provider Norma Bhandari called to the bedside, Patient has a complaint that she is unable to old her urine, the bed is wet. Patient is changed and rectal tone is checked by SAI Ellison. Tone is weaken Order received for MRI. MRI check list completed. Patient is still refusing Morphine and desires percocet instead. Patient given medication at this time. Patient is starting to calm down. Plan has changed to admit vs discharge. Patient's family contacted per patient's request and patient. Warm blanket provided, door closed and call bell within reach. All safety measures are in place will continue to monitor.
Patient resting well call bell within reach no additional wants or needs noted call bell within reach VSS all safety measures are in place.
Patient's residual Bladder scan 174ml
Pt report given to [facility].
TRANSFER - OUT REPORT:    Verbal report given to jamil LUCERO (name) on Akanksha Membreno  being transferred to [facility](unit) for routine progression of care       Report consisted of patients Situation, Background, Assessment and   Recommendations(SBAR). Information from the following report(s) ED Summary, MAR and Recent Results was reviewed with the receiving nurse. Lines:   Peripheral IV 04/06/18 Left Arm (Active)   Site Assessment Clean, dry, & intact 4/6/2018 11:07 AM   Phlebitis Assessment 0 4/6/2018 11:07 AM   Infiltration Assessment 0 4/6/2018 11:07 AM   Dressing Status Clean, dry, & intact 4/6/2018 11:07 AM   Dressing Type 4 X 4 4/6/2018 11:07 AM   Hub Color/Line Status Pink 4/6/2018 11:07 AM   Alcohol Cap Used No 4/6/2018 11:07 AM        Opportunity for questions and clarification was provided.       Patient transported with:   Registered Nurse
done

## 2022-03-18 PROBLEM — Z20.822 SUSPECTED COVID-19 VIRUS INFECTION: Status: ACTIVE | Noted: 2020-09-01

## 2022-03-18 PROBLEM — R32 INCONTINENCE OF URINE: Status: ACTIVE | Noted: 2018-04-06

## 2022-03-18 PROBLEM — I10 ESSENTIAL HYPERTENSION, BENIGN: Status: ACTIVE | Noted: 2022-02-10

## 2022-03-19 PROBLEM — U07.1 PNEUMONIA DUE TO COVID-19 VIRUS: Status: ACTIVE | Noted: 2020-09-01

## 2022-03-19 PROBLEM — Z95.9 CARDIAC DEVICE IN SITU: Status: ACTIVE | Noted: 2020-10-28

## 2022-03-19 PROBLEM — R09.02 HYPOXIA: Status: ACTIVE | Noted: 2020-09-01

## 2022-03-19 PROBLEM — J96.01 ACUTE RESPIRATORY FAILURE WITH HYPOXIA (HCC): Status: ACTIVE | Noted: 2022-02-10

## 2022-03-19 PROBLEM — I48.91 NEW ONSET ATRIAL FIBRILLATION (HCC): Status: ACTIVE | Noted: 2018-04-06

## 2022-03-19 PROBLEM — R32 INCONTINENT OF URINE: Status: ACTIVE | Noted: 2018-04-06

## 2022-03-19 PROBLEM — J12.82 PNEUMONIA DUE TO COVID-19 VIRUS: Status: ACTIVE | Noted: 2020-09-01

## 2022-05-09 ENCOUNTER — HOSPITAL ENCOUNTER (OUTPATIENT)
Dept: LAB | Age: 62
Discharge: HOME | End: 2022-05-09
Payer: COMMERCIAL

## 2022-05-09 LAB
25(OH)D3 SERPL-MCNC: 35.9 NG/ML (ref 30–100)
ALBUMIN SERPL-MCNC: 3.5 G/DL (ref 3.4–5)
ALBUMIN/GLOB SERPL: 0.9 {RATIO} (ref 0.8–1.7)
ALP SERPL-CCNC: 124 U/L (ref 45–117)
ALT SERPL-CCNC: 77 U/L (ref 13–56)
ANION GAP SERPL CALC-SCNC: 4 MMOL/L (ref 3–18)
AST SERPL-CCNC: 63 U/L (ref 10–38)
BASOPHILS # BLD: 0.1 K/UL (ref 0–0.1)
BASOPHILS NFR BLD: 1 % (ref 0–2)
BILIRUB SERPL-MCNC: 0.6 MG/DL (ref 0.2–1)
BUN SERPL-MCNC: 17 MG/DL (ref 7–18)
BUN/CREAT SERPL: 20 (ref 12–20)
CALCIUM SERPL-MCNC: 8.9 MG/DL (ref 8.5–10.1)
CHLORIDE SERPL-SCNC: 110 MMOL/L (ref 100–111)
CHOLEST SERPL-MCNC: 211 MG/DL
CO2 SERPL-SCNC: 26 MMOL/L (ref 21–32)
CREAT SERPL-MCNC: 0.86 MG/DL (ref 0.6–1.3)
DIFFERENTIAL METHOD BLD: ABNORMAL
EOSINOPHIL # BLD: 0.5 K/UL (ref 0–0.4)
EOSINOPHIL NFR BLD: 12 % (ref 0–5)
ERYTHROCYTE [DISTWIDTH] IN BLOOD BY AUTOMATED COUNT: 14.1 % (ref 11.6–14.5)
GLOBULIN SER CALC-MCNC: 3.7 G/DL (ref 2–4)
GLUCOSE SERPL-MCNC: 82 MG/DL (ref 74–99)
HCT VFR BLD AUTO: 38.6 % (ref 35–45)
HDLC SERPL-MCNC: 61 MG/DL (ref 40–60)
HDLC SERPL: 3.5 {RATIO} (ref 0–5)
HGB BLD-MCNC: 12.1 G/DL (ref 12–16)
IMM GRANULOCYTES # BLD AUTO: 0 K/UL (ref 0–0.04)
IMM GRANULOCYTES NFR BLD AUTO: 0 % (ref 0–0.5)
LDLC SERPL CALC-MCNC: 141 MG/DL (ref 0–100)
LIPID PROFILE,FLP: ABNORMAL
LYMPHOCYTES # BLD: 1.2 K/UL (ref 0.9–3.6)
LYMPHOCYTES NFR BLD: 30 % (ref 21–52)
MCH RBC QN AUTO: 28.9 PG (ref 24–34)
MCHC RBC AUTO-ENTMCNC: 31.3 G/DL (ref 31–37)
MCV RBC AUTO: 92.3 FL (ref 78–100)
MONOCYTES # BLD: 0.3 K/UL (ref 0.05–1.2)
MONOCYTES NFR BLD: 7 % (ref 3–10)
NEUTS SEG # BLD: 2 K/UL (ref 1.8–8)
NEUTS SEG NFR BLD: 49 % (ref 40–73)
NRBC # BLD: 0 K/UL (ref 0–0.01)
NRBC BLD-RTO: 0 PER 100 WBC
PLATELET # BLD AUTO: 204 K/UL (ref 135–420)
PMV BLD AUTO: 10.8 FL (ref 9.2–11.8)
POTASSIUM SERPL-SCNC: 4.2 MMOL/L (ref 3.5–5.5)
PROT SERPL-MCNC: 7.2 G/DL (ref 6.4–8.2)
RBC # BLD AUTO: 4.18 M/UL (ref 4.2–5.3)
SODIUM SERPL-SCNC: 140 MMOL/L (ref 136–145)
TRIGL SERPL-MCNC: 45 MG/DL (ref ?–150)
VLDLC SERPL CALC-MCNC: 9 MG/DL
WBC # BLD AUTO: 4 K/UL (ref 4.6–13.2)

## 2022-05-09 PROCEDURE — 80053 COMPREHEN METABOLIC PANEL: CPT

## 2022-05-09 PROCEDURE — 80061 LIPID PANEL: CPT

## 2022-05-09 PROCEDURE — 84436 ASSAY OF TOTAL THYROXINE: CPT

## 2022-05-09 PROCEDURE — 82306 VITAMIN D 25 HYDROXY: CPT

## 2022-05-09 PROCEDURE — 36415 COLL VENOUS BLD VENIPUNCTURE: CPT

## 2022-05-09 PROCEDURE — 85025 COMPLETE CBC W/AUTO DIFF WBC: CPT

## 2022-05-10 LAB
T4 SERPL-MCNC: 10.6 UG/DL (ref 4.8–13.9)
TSH SERPL DL<=0.05 MIU/L-ACNC: 2.14 UIU/ML (ref 0.36–3.74)

## 2022-05-11 ENCOUNTER — OFFICE VISIT (OUTPATIENT)
Dept: CARDIOLOGY CLINIC | Age: 62
End: 2022-05-11
Payer: COMMERCIAL

## 2022-05-11 DIAGNOSIS — I48.0 PAROXYSMAL ATRIAL FIBRILLATION (HCC): ICD-10-CM

## 2022-05-11 DIAGNOSIS — Z95.9 CARDIAC DEVICE IN SITU: Primary | ICD-10-CM

## 2022-06-08 PROCEDURE — 93298 REM INTERROG DEV EVAL SCRMS: CPT | Performed by: INTERNAL MEDICINE

## 2022-06-08 NOTE — PROGRESS NOTES
I have personally seen and evaluated the device findings. Interrogation reviewed and I agree with assessment.     Alpa Bedolla

## 2022-08-26 ENCOUNTER — TRANSCRIBE ORDER (OUTPATIENT)
Dept: SCHEDULING | Age: 62
End: 2022-08-26

## 2022-08-26 DIAGNOSIS — Z12.31 SCREENING MAMMOGRAM FOR HIGH-RISK PATIENT: Primary | ICD-10-CM

## 2022-09-01 ENCOUNTER — CLINICAL SUPPORT (OUTPATIENT)
Dept: CARDIOLOGY CLINIC | Age: 62
End: 2022-09-01

## 2022-09-01 ENCOUNTER — OFFICE VISIT (OUTPATIENT)
Dept: CARDIOLOGY CLINIC | Age: 62
End: 2022-09-01
Payer: COMMERCIAL

## 2022-09-01 VITALS
DIASTOLIC BLOOD PRESSURE: 84 MMHG | HEART RATE: 62 BPM | OXYGEN SATURATION: 99 % | HEIGHT: 62 IN | BODY MASS INDEX: 45.45 KG/M2 | WEIGHT: 247 LBS | SYSTOLIC BLOOD PRESSURE: 122 MMHG

## 2022-09-01 DIAGNOSIS — I48.0 PAROXYSMAL ATRIAL FIBRILLATION (HCC): Primary | ICD-10-CM

## 2022-09-01 DIAGNOSIS — Z79.01 ON APIXABAN THERAPY: ICD-10-CM

## 2022-09-01 DIAGNOSIS — I10 ESSENTIAL HYPERTENSION: ICD-10-CM

## 2022-09-01 DIAGNOSIS — Z95.9 CARDIAC DEVICE IN SITU: Primary | ICD-10-CM

## 2022-09-01 DIAGNOSIS — I48.0 PAROXYSMAL ATRIAL FIBRILLATION (HCC): ICD-10-CM

## 2022-09-01 DIAGNOSIS — Z95.9 CARDIAC DEVICE IN SITU: ICD-10-CM

## 2022-09-01 DIAGNOSIS — Z98.890 H/O CARDIAC RADIOFREQUENCY ABLATION: ICD-10-CM

## 2022-09-01 PROCEDURE — 93291 INTERROG DEV EVAL SCRMS IP: CPT | Performed by: INTERNAL MEDICINE

## 2022-09-01 PROCEDURE — 99215 OFFICE O/P EST HI 40 MIN: CPT | Performed by: INTERNAL MEDICINE

## 2022-09-01 RX ORDER — FLECAINIDE ACETATE 50 MG/1
50 TABLET ORAL 2 TIMES DAILY
Qty: 60 TABLET | Refills: 11 | Status: SHIPPED | OUTPATIENT
Start: 2022-09-01

## 2022-09-01 NOTE — PROGRESS NOTES
History of Present Illness:  63 YOF here for follow up. She had EP study April 2021, had inducible atrial fibrillation. She underwent PVI June 2021 and has done remarkably well since then. She had a loop recorder placed in October 2020. She had been on Eliquis and still is and had previous hematuria and bleeding issues related to her menstrual cycle. This has since resolved. No new chest pain, dyspnea, PND, orthopnea, edema or palpitations. Impression:  Paroxysmal symptomatic a-fib, on Eliquis with PVI June 2021. She also had a focal atrial tachycardia by EP study April 2021 with no evidence of atrial flutter or AVNRT or pathway. Subcutaneous loop recorder placed October 2020 at [facility], no events by device check today. History of anemia with hematuria that was cyclical and related to menses, stable blood counts, no recurrent bleeding. SSS, limiting AV blocking agents, but taking low dose Flecainide. HTN, controlled. History of spinal stenosis. Echo 2020 with EF 55%. BMI of 45, increased from 44 in February. Plan:  She has absolutely no events on loop recorder, but still is at risk for atrial tachycardia and more atrial fibrillation, especially given comorbidities. I will therefore continue with Eliquis indefinitely for now. I talked about the use of Watchman long term. At this point we will monitor. BP is controlled. I will see back in six months. Past Medical History:   Diagnosis Date    Arthritis     bl knees     Atrial fibrillation (HCC)     Essential hypertension        Current Outpatient Medications   Medication Sig Dispense Refill    apixaban (Eliquis) 5 mg tablet TAKE 1 TABLET BY MOUTH TWO TIMES A DAY 60 Tablet 11    flecainide (TAMBOCOR) 50 mg tablet Take 1 Tablet by mouth two (2) times a day. 60 Tablet 11    metoprolol tartrate (LOPRESSOR) 50 mg tablet Take 50 mg by mouth two (2) times a day.       pantoprazole (PROTONIX) 20 mg tablet Take 1 Tablet by mouth Daily (before breakfast). 30 Tablet 3    amLODIPine (NORVASC) 10 mg tablet Take 0.5 Tablets by mouth daily. 30 Tablet 1    cyanocobalamin (VITAMIN B12) 100 mcg tablet Take 100 mcg by mouth daily. multivitamin (ONE A DAY) tablet Take 1 Tab by mouth daily. cyclobenzaprine (FLEXERIL) 10 mg tablet Take  by mouth three (3) times daily as needed for Muscle Spasm(s). acetaminophen (TYLENOL) 325 mg tablet Take  by mouth every four (4) hours as needed for Pain. diclofenac EC (VOLTAREN) 75 mg EC tablet Take 75 mg by mouth two (2) times a day. cholecalciferol, vitamin D3, 50 mcg (2,000 unit) tab Take  by mouth daily. meclizine (ANTIVERT) 25 mg tablet Take  by mouth three (3) times daily as needed. Social History   reports that she has never smoked. She has never used smokeless tobacco.   reports no history of alcohol use. Family History  family history includes Cancer in her father and mother; Diabetes in her mother; Heart Attack in her brother. Review of Systems  Except as stated above include:  Constitutional: Negative for fever, chills and malaise/fatigue. HEENT: No congestion or recent URI. Gastrointestinal: No nausea, vomiting, abdominal pain, bloody stools. Pulmonary:  Negative except as stated above. Cardiac:  Negative except as stated above. Musculoskeletal: Negative except as stated above. Neurological:  No localized symptoms. Skin:  Negative except as stated above. Psych:  Negative except as stated above. Endocrine:  Negative except as stated above. PHYSICAL EXAM  BP Readings from Last 3 Encounters:   09/01/22 122/84   02/10/22 120/80   08/12/21 128/86     Pulse Readings from Last 3 Encounters:   09/01/22 62   02/10/22 (!) 57   08/12/21 (!) 57     Wt Readings from Last 3 Encounters:   09/01/22 112 kg (247 lb)   02/10/22 109.8 kg (242 lb)   08/12/21 110.7 kg (244 lb)     General:   Well developed, well groomed.     Head/Neck:   No obvious jugular venous distention     No obvious carotid pulsations. No evidence of xanthelasma. Lungs:   No respiratory distress. Clear bilaterally. Heart:  Regular rate and rhythm. Normal S1/S2. Palpation grossly normal.    No significant murmurs, rubs or gallops. Abdomen:   Non-acute abdomen. No obvious pulsations. Extremities:   Intact peripheral pulses. No significant edema. Neurological:   Alert and oriented to person, place, time. No focal neurological deficit visually. Skin:   No obvious rash    Blood Pressure Metric:  Monitor recommended and adjustments stated if needed.

## 2022-09-01 NOTE — PROGRESS NOTES
Mae Franklin presents today for   Chief Complaint   Patient presents with    Follow-up     7 month follow up       Shortness of Breath     With exertion       Palpitations     Occasionally          Mae Franklin preferred language for health care discussion is english/other. Is someone accompanying this pt? No     Is the patient using any DME equipment during OV? no    Depression Screening:  3 most recent PHQ Screens 2/10/2022   Little interest or pleasure in doing things Not at all   Feeling down, depressed, irritable, or hopeless Not at all   Total Score PHQ 2 0       Learning Assessment:  Learning Assessment 8/12/2021   PRIMARY LEARNER Patient   PRIMARY LANGUAGE ENGLISH   LEARNER PREFERENCE PRIMARY DEMONSTRATION   ANSWERED BY patient   RELATIONSHIP SELF       Abuse Screening:  Abuse Screening Questionnaire 8/12/2021   Do you ever feel afraid of your partner? N   Are you in a relationship with someone who physically or mentally threatens you? N   Is it safe for you to go home? Y       Fall Risk  Fall Risk Assessment, last 12 mths 2/3/2021   Able to walk? Yes   Fall in past 12 months? 0   Do you feel unsteady? 0   Are you worried about falling 0       Pt currently taking Anticoagulant therapy? Eliquis 5mg twice a day     Coordination of Care:  1. Have you been to the ER, urgent care clinic since your last visit? Hospitalized since your last visit? no    2. Have you seen or consulted any other health care providers outside of the [address] [place] [address] since your last visit? Include any pap smears or colon screening.  no

## 2022-09-06 NOTE — PROGRESS NOTES
I have personally seen and evaluated the device findings. Interrogation reviewed and I agree with assessment.     Pablo Jack

## 2022-09-23 ENCOUNTER — HOSPITAL ENCOUNTER (OUTPATIENT)
Dept: MAMMOGRAPHY | Age: 62
Discharge: HOME | End: 2022-09-23
Attending: INTERNAL MEDICINE
Payer: COMMERCIAL

## 2022-09-23 DIAGNOSIS — Z12.31 SCREENING MAMMOGRAM FOR HIGH-RISK PATIENT: ICD-10-CM

## 2022-09-23 PROCEDURE — 77063 BREAST TOMOSYNTHESIS BI: CPT

## 2022-11-09 ENCOUNTER — HOSPITAL ENCOUNTER (OUTPATIENT)
Dept: LAB | Age: 62
Discharge: HOME | End: 2022-11-09

## 2022-11-09 LAB — XX-LABCORP SPECIMEN COL,LCBCF: NORMAL

## 2022-11-09 PROCEDURE — 99001 SPECIMEN HANDLING PT-LAB: CPT

## 2023-03-08 ENCOUNTER — OFFICE VISIT (OUTPATIENT)
Age: 63
End: 2023-03-08
Payer: COMMERCIAL

## 2023-03-08 ENCOUNTER — NURSE ONLY (OUTPATIENT)
Age: 63
End: 2023-03-08
Payer: COMMERCIAL

## 2023-03-08 VITALS
SYSTOLIC BLOOD PRESSURE: 138 MMHG | HEIGHT: 67 IN | BODY MASS INDEX: 39.43 KG/M2 | WEIGHT: 251.2 LBS | OXYGEN SATURATION: 99 % | HEART RATE: 99 BPM | DIASTOLIC BLOOD PRESSURE: 84 MMHG

## 2023-03-08 DIAGNOSIS — R00.2 PALPITATIONS: ICD-10-CM

## 2023-03-08 DIAGNOSIS — Z95.818 IMPLANTABLE LOOP RECORDER PRESENT: Primary | ICD-10-CM

## 2023-03-08 DIAGNOSIS — Z79.899 LONG TERM CURRENT USE OF ANTIARRHYTHMIC DRUG: ICD-10-CM

## 2023-03-08 DIAGNOSIS — Z79.01 ON APIXABAN THERAPY: ICD-10-CM

## 2023-03-08 DIAGNOSIS — Z95.818 IMPLANTABLE LOOP RECORDER PRESENT: ICD-10-CM

## 2023-03-08 DIAGNOSIS — I10 PRIMARY HYPERTENSION: ICD-10-CM

## 2023-03-08 DIAGNOSIS — R00.2 PALPITATION: Primary | ICD-10-CM

## 2023-03-08 PROBLEM — E66.01 SEVERE OBESITY WITH BODY MASS INDEX (BMI) OF 35.0 TO 39.9 WITH SERIOUS COMORBIDITY (HCC): Status: ACTIVE | Noted: 2018-07-12

## 2023-03-08 PROCEDURE — 99214 OFFICE O/P EST MOD 30 MIN: CPT | Performed by: INTERNAL MEDICINE

## 2023-03-08 PROCEDURE — 3075F SYST BP GE 130 - 139MM HG: CPT | Performed by: INTERNAL MEDICINE

## 2023-03-08 PROCEDURE — 3079F DIAST BP 80-89 MM HG: CPT | Performed by: INTERNAL MEDICINE

## 2023-03-08 PROCEDURE — 93290 INTERROG DEV EVAL ICPMS IP: CPT | Performed by: INTERNAL MEDICINE

## 2023-03-08 RX ORDER — OMEPRAZOLE/SODIUM BICARBONATE 20MG-1.1G
CAPSULE ORAL EVERY EVENING
COMMUNITY

## 2023-03-08 RX ORDER — ACETAMINOPHEN 160 MG
TABLET,DISINTEGRATING ORAL
COMMUNITY
Start: 2023-02-24

## 2023-03-08 RX ORDER — FLECAINIDE ACETATE 50 MG/1
50 TABLET ORAL 2 TIMES DAILY
Qty: 60 TABLET | Refills: 6 | Status: SHIPPED | OUTPATIENT
Start: 2023-03-08

## 2023-03-08 RX ORDER — METOPROLOL TARTRATE 50 MG/1
50 TABLET, FILM COATED ORAL 2 TIMES DAILY
Qty: 60 TABLET | Refills: 6 | Status: SHIPPED | OUTPATIENT
Start: 2023-03-08

## 2023-03-08 RX ORDER — CEPHALEXIN 500 MG/1
CAPSULE ORAL
COMMUNITY
Start: 2023-02-24

## 2023-03-08 RX ORDER — FERROUS SULFATE 325(65) MG
TABLET ORAL EVERY OTHER DAY
COMMUNITY
Start: 2020-09-08

## 2023-03-08 RX ORDER — AMLODIPINE BESYLATE 10 MG/1
5 TABLET ORAL DAILY
Qty: 30 TABLET | Refills: 5 | Status: SHIPPED | OUTPATIENT
Start: 2023-03-08

## 2023-03-08 RX ORDER — ZOLPIDEM TARTRATE 5 MG/1
TABLET ORAL
COMMUNITY
Start: 2023-02-24

## 2023-03-08 NOTE — PROGRESS NOTES
History of Present Illness:  66 YOF here for follow up. Overall she is doing relatively well. She has a history of atrial fibrillation with ablation, as well as inducible atrial tachycardia and has a loop recorder placed. She has some mild insomnia at times and dyspnea and fatigue, but no new chest pain, syncope, PND, orthopnea, edema or palpitations. Impression:  Paroxysmal symptomatic a-fib with PVI June 2021, but also focal atrial tachycardia by EP study April 2021 without evidence of atrial flutter, AVNRT or pathway. Subcutaneous loop recorder placed October 2020 at [facility] without events today. History of anemia, hematuria, thought to be related to menses, cyclical, but no recurrence. Sick sinus syndrome, limiting AV blocking agents, but taking low dose Flecainide. Hypertension, controlled. Spinal stenosis, stable. Echocardiogram 2020 with EF 55%. Plan:  She has no events on loop recorder, but she is at risk for atrial tachycardia and recurrent atrial fibrillation, especially given comorbidities. Will continue with Eliquis for now, but it may be reasonable to consider Watchman if hematuria recurs. Blood pressure is controlled usually at home. I will see back in six months.           Wt Readings from Last 3 Encounters:   03/08/23 251 lb 3.2 oz (113.9 kg)   09/01/22 247 lb (112 kg)   02/10/22 242 lb (109.8 kg)     Past Medical History:   Diagnosis Date    Arthritis     bl knees     Atrial fibrillation (HCC)     Essential hypertension        Current Outpatient Medications   Medication Sig Dispense Refill    ferrous sulfate (IRON 325) 325 (65 Fe) MG tablet Take by mouth every other day      cephALEXin (KEFLEX) 500 MG capsule       Cholecalciferol (VITAMIN D3) 50 MCG (2000 UT) CAPS       Omeprazole-Sodium Bicarbonate  MG CAPS Take by mouth every evening      zolpidem (AMBIEN) 5 MG tablet       acetaminophen (TYLENOL) 325 MG tablet Take by mouth every 4 hours as needed      amLODIPine (NORVASC) 10 MG tablet Take 5 mg by mouth daily      apixaban (ELIQUIS) 5 MG TABS tablet TAKE 1 TABLET BY MOUTH TWO TIMES A DAY      Cholecalciferol 50 MCG (2000 UT) TABS Take by mouth daily      cyanocobalamin 100 MCG tablet Take 100 mcg by mouth daily      cyclobenzaprine (FLEXERIL) 10 MG tablet Take by mouth 3 times daily as needed      diclofenac (VOLTAREN) 75 MG EC tablet Take 75 mg by mouth 2 times daily      flecainide (TAMBOCOR) 50 MG tablet Take 50 mg by mouth 2 times daily      meclizine (ANTIVERT) 25 MG tablet Take by mouth 3 times daily as needed      metoprolol tartrate (LOPRESSOR) 50 MG tablet Take 50 mg by mouth 2 times daily      pantoprazole (PROTONIX) 20 MG tablet Take 20 mg by mouth every morning (before breakfast)       No current facility-administered medications for this visit. Social History   reports that she has never smoked. She has never used smokeless tobacco.   reports no history of alcohol use. Family History  family history includes Cancer in her father and mother; Diabetes in her mother; Heart Attack in her brother. Review of Systems  Except as stated above include:  Constitutional: Negative for fever, chills and malaise/fatigue. HEENT: No congestion or recent URI. Gastrointestinal: No nausea, vomiting, abdominal pain, bloody stools. Pulmonary:  Negative except as stated above. Cardiac:  Negative except as stated above. Musculoskeletal: Negative except as stated above. Neurological:  No localized symptoms. Skin:  Negative except as stated above. Psych:  Negative except as stated above. Endocrine:  Negative except as stated above. PHYSICAL EXAM  BP Readings from Last 3 Encounters:   03/08/23 138/84   09/01/22 122/84   02/10/22 120/80     Pulse Readings from Last 3 Encounters:   03/08/23 99   09/01/22 62   02/10/22 57     General:   Well developed, well groomed. Head/Neck:   No obvious jugular venous distention     No obvious carotid pulsations.       No evidence of xanthelasma. Lungs:   No respiratory distress. Clear bilaterally. Heart:  Regular rate and rhythm. Normal S1/S2. Palpation grossly normal.    No significant murmurs, rubs or gallops. Abdomen:   Non-acute abdomen. No obvious pulsations. Extremities:   Intact peripheral pulses. No significant edema. Neurological:   Alert and oriented to person, place, time. No focal neurological deficit visually. Skin:   No obvious rash    Blood Pressure Metric:  Monitor recommended and adjustments stated if needed.

## 2023-04-06 NOTE — Clinical Note
ID band present and verified. Impression: Vitreous degeneration, bilateral: H43.813. OU. Plan: Chronic. Observe. Statement Selected

## 2023-05-17 ENCOUNTER — PROCEDURE VISIT (OUTPATIENT)
Age: 63
End: 2023-05-17

## 2023-05-17 DIAGNOSIS — Z95.818 IMPLANTABLE LOOP RECORDER PRESENT: Primary | ICD-10-CM

## 2023-05-17 DIAGNOSIS — I48.0 PAROXYSMAL ATRIAL FIBRILLATION (HCC): ICD-10-CM

## 2023-05-23 RX ORDER — METOPROLOL TARTRATE 50 MG/1
TABLET, FILM COATED ORAL
Qty: 60 TABLET | Refills: 5 | Status: SHIPPED | OUTPATIENT
Start: 2023-05-23

## 2023-06-20 ENCOUNTER — HOSPITAL ENCOUNTER (OUTPATIENT)
Facility: HOSPITAL | Age: 63
Discharge: HOME | End: 2023-06-23
Payer: COMMERCIAL

## 2023-06-20 DIAGNOSIS — I10 ESSENTIAL HYPERTENSION, MALIGNANT: ICD-10-CM

## 2023-06-20 DIAGNOSIS — M54.50 LUMBAR PAIN: ICD-10-CM

## 2023-06-20 DIAGNOSIS — M16.0 PRIMARY OSTEOARTHRITIS OF BOTH HIPS: ICD-10-CM

## 2023-06-20 DIAGNOSIS — D50.9 IRON DEFICIENCY ANEMIA, UNSPECIFIED IRON DEFICIENCY ANEMIA TYPE: ICD-10-CM

## 2023-06-20 DIAGNOSIS — I48.20 CHRONIC ATRIAL FIBRILLATION (HCC): ICD-10-CM

## 2023-06-20 DIAGNOSIS — E55.9 AVITAMINOSIS D: ICD-10-CM

## 2023-06-20 DIAGNOSIS — K21.9 GASTROESOPHAGEAL REFLUX DISEASE, UNSPECIFIED WHETHER ESOPHAGITIS PRESENT: ICD-10-CM

## 2023-06-20 LAB
25(OH)D3 SERPL-MCNC: 44.7 NG/ML (ref 30–100)
ALBUMIN SERPL-MCNC: 3.4 G/DL (ref 3.4–5)
ALBUMIN/GLOB SERPL: 0.9 (ref 0.8–1.7)
ALP SERPL-CCNC: 112 U/L (ref 45–117)
ALT SERPL-CCNC: 165 U/L (ref 13–56)
ANION GAP SERPL CALC-SCNC: 5 MMOL/L (ref 3–18)
AST SERPL-CCNC: 112 U/L (ref 10–38)
BASOPHILS # BLD: 0.1 K/UL (ref 0–0.1)
BASOPHILS NFR BLD: 1 % (ref 0–2)
BILIRUB SERPL-MCNC: 1.2 MG/DL (ref 0.2–1)
BUN SERPL-MCNC: 18 MG/DL (ref 7–18)
BUN/CREAT SERPL: 21 (ref 12–20)
CALCIUM SERPL-MCNC: 8.6 MG/DL (ref 8.5–10.1)
CHLORIDE SERPL-SCNC: 110 MMOL/L (ref 100–111)
CHOLEST SERPL-MCNC: 232 MG/DL
CO2 SERPL-SCNC: 24 MMOL/L (ref 21–32)
CREAT SERPL-MCNC: 0.86 MG/DL (ref 0.6–1.3)
DIFFERENTIAL METHOD BLD: ABNORMAL
EOSINOPHIL # BLD: 0.4 K/UL (ref 0–0.4)
EOSINOPHIL NFR BLD: 12 % (ref 0–5)
ERYTHROCYTE [DISTWIDTH] IN BLOOD BY AUTOMATED COUNT: 12.8 % (ref 11.6–14.5)
GLOBULIN SER CALC-MCNC: 3.6 G/DL (ref 2–4)
GLUCOSE SERPL-MCNC: 77 MG/DL (ref 74–99)
HCT VFR BLD AUTO: 40.1 % (ref 35–45)
HDLC SERPL-MCNC: 59 MG/DL (ref 40–60)
HDLC SERPL: 3.9 (ref 0–5)
HGB BLD-MCNC: 13.1 G/DL (ref 12–16)
IMM GRANULOCYTES # BLD AUTO: 0 K/UL (ref 0–0.04)
IMM GRANULOCYTES NFR BLD AUTO: 0 % (ref 0–0.5)
LDLC SERPL CALC-MCNC: 163.2 MG/DL (ref 0–100)
LIPID PANEL: ABNORMAL
LYMPHOCYTES # BLD: 1.5 K/UL (ref 0.9–3.6)
LYMPHOCYTES NFR BLD: 41 % (ref 21–52)
MCH RBC QN AUTO: 31.2 PG (ref 24–34)
MCHC RBC AUTO-ENTMCNC: 32.7 G/DL (ref 31–37)
MCV RBC AUTO: 95.5 FL (ref 78–100)
MONOCYTES # BLD: 0.2 K/UL (ref 0.05–1.2)
MONOCYTES NFR BLD: 7 % (ref 3–10)
NEUTS SEG # BLD: 1.4 K/UL (ref 1.8–8)
NEUTS SEG NFR BLD: 39 % (ref 40–73)
NRBC # BLD: 0 K/UL (ref 0–0.01)
NRBC BLD-RTO: 0 PER 100 WBC
PLATELET # BLD AUTO: 171 K/UL (ref 135–420)
PMV BLD AUTO: 11.6 FL (ref 9.2–11.8)
POTASSIUM SERPL-SCNC: 4.5 MMOL/L (ref 3.5–5.5)
PROT SERPL-MCNC: 7 G/DL (ref 6.4–8.2)
RBC # BLD AUTO: 4.2 M/UL (ref 4.2–5.3)
SODIUM SERPL-SCNC: 139 MMOL/L (ref 136–145)
T4 SERPL-MCNC: 9.4 UG/DL (ref 4.8–13.9)
TRIGL SERPL-MCNC: 49 MG/DL
TSH SERPL DL<=0.05 MIU/L-ACNC: 2.23 UIU/ML (ref 0.36–3.74)
VLDLC SERPL CALC-MCNC: 9.8 MG/DL
WBC # BLD AUTO: 3.5 K/UL (ref 4.6–13.2)

## 2023-06-20 PROCEDURE — 80061 LIPID PANEL: CPT

## 2023-06-20 PROCEDURE — 82306 VITAMIN D 25 HYDROXY: CPT

## 2023-06-20 PROCEDURE — 36415 COLL VENOUS BLD VENIPUNCTURE: CPT

## 2023-06-20 PROCEDURE — 80053 COMPREHEN METABOLIC PANEL: CPT

## 2023-06-20 PROCEDURE — 84436 ASSAY OF TOTAL THYROXINE: CPT

## 2023-06-20 PROCEDURE — 84443 ASSAY THYROID STIM HORMONE: CPT

## 2023-06-20 PROCEDURE — 85025 COMPLETE CBC W/AUTO DIFF WBC: CPT

## 2023-08-29 ENCOUNTER — TRANSCRIBE ORDERS (OUTPATIENT)
Facility: HOSPITAL | Age: 63
End: 2023-08-29

## 2023-08-29 DIAGNOSIS — Z12.31 VISIT FOR SCREENING MAMMOGRAM: Primary | ICD-10-CM

## 2023-09-14 ENCOUNTER — NURSE ONLY (OUTPATIENT)
Age: 63
End: 2023-09-14
Payer: COMMERCIAL

## 2023-09-14 ENCOUNTER — OFFICE VISIT (OUTPATIENT)
Age: 63
End: 2023-09-14

## 2023-09-14 VITALS
OXYGEN SATURATION: 96 % | WEIGHT: 258 LBS | BODY MASS INDEX: 40.41 KG/M2 | SYSTOLIC BLOOD PRESSURE: 134 MMHG | DIASTOLIC BLOOD PRESSURE: 90 MMHG | HEART RATE: 61 BPM

## 2023-09-14 DIAGNOSIS — I48.0 PAROXYSMAL ATRIAL FIBRILLATION (HCC): Primary | ICD-10-CM

## 2023-09-14 DIAGNOSIS — Z95.818 STATUS POST PLACEMENT OF IMPLANTABLE LOOP RECORDER: ICD-10-CM

## 2023-09-14 DIAGNOSIS — I10 PRIMARY HYPERTENSION: ICD-10-CM

## 2023-09-14 DIAGNOSIS — R00.2 PALPITATIONS: ICD-10-CM

## 2023-09-14 PROCEDURE — 93285 PRGRMG DEV EVAL SCRMS IP: CPT | Performed by: INTERNAL MEDICINE

## 2023-09-14 NOTE — PROGRESS NOTES
History of Present Illness:  66 YOF here for follow up. Overall she is doing well. She has a history of atrial fibrillation and previous ablation, as well as short runs of atrial tachycardia with loop recorder in place. No new palpitations. No chest pain, dyspnea, PND, orthopnea or edema. No bleeding issues. Impression:  History of symptomatic atrial fibrillation with PVI June 2021. History of focal atrial tachycardia by EP study April 2021 without evidence of atrial flutter, AVNRT or pathway. Subcutaneous loop recorder 2020 at [facility] without any events. History of anemia and hematuria, thought to be related to menses and cyclical, but tolerating Eliquis. Sick sinus syndrome, limiting AV blocking agents, but taking Flecainide 50 mg twice daily. Hypertension, controlled. Spinal stenosis, stable. Echocardiogram 2020 with EF 55%. Plan:  Loop recorder shows no events, but given her high risk for atrial tachycardia and recurrent events, we will continue with Eliquis. No bleeding issues at this time. If she does have significant issues, we had talked about Watchman in the past.  Blood pressure is reasonable. No evidence of decompensated heart failure and we will continue with Flecainide. I will see back in six months.         Wt Readings from Last 3 Encounters:   03/08/23 251 lb 3.2 oz (113.9 kg)   09/01/22 247 lb (112 kg)   02/10/22 242 lb (109.8 kg)     Past Medical History:   Diagnosis Date    Arthritis     bl knees     Atrial fibrillation (HCC)     Essential hypertension        Current Outpatient Medications   Medication Sig Dispense Refill    metoprolol tartrate (LOPRESSOR) 50 MG tablet TAKE 1 TABLET BY MOUTH TWO TIMES DAILY 60 tablet 5    cephALEXin (KEFLEX) 500 MG capsule       Cholecalciferol (VITAMIN D3) 50 MCG (2000 UT) CAPS       ferrous sulfate (IRON 325) 325 (65 Fe) MG tablet Take by mouth every other day      Omeprazole-Sodium Bicarbonate  MG CAPS Take by mouth every evening

## 2023-09-26 ENCOUNTER — HOSPITAL ENCOUNTER (OUTPATIENT)
Facility: HOSPITAL | Age: 63
Discharge: HOME | End: 2023-09-29
Payer: COMMERCIAL

## 2023-09-26 VITALS — HEIGHT: 67 IN | WEIGHT: 258 LBS | BODY MASS INDEX: 40.49 KG/M2

## 2023-09-26 DIAGNOSIS — Z12.31 VISIT FOR SCREENING MAMMOGRAM: ICD-10-CM

## 2023-09-26 PROCEDURE — 77063 BREAST TOMOSYNTHESIS BI: CPT

## 2023-09-26 PROCEDURE — 77067 SCR MAMMO BI INCL CAD: CPT

## 2023-11-16 ENCOUNTER — PROCEDURE VISIT (OUTPATIENT)
Age: 63
End: 2023-11-16
Payer: COMMERCIAL

## 2023-11-16 DIAGNOSIS — I47.10 SUPRAVENTRICULAR TACHYCARDIA: ICD-10-CM

## 2023-11-16 DIAGNOSIS — Z95.818 STATUS POST PLACEMENT OF IMPLANTABLE LOOP RECORDER: Primary | ICD-10-CM

## 2024-01-02 PROCEDURE — 93298 REM INTERROG DEV EVAL SCRMS: CPT | Performed by: INTERNAL MEDICINE

## 2024-02-08 ENCOUNTER — PROCEDURE VISIT (OUTPATIENT)
Age: 64
End: 2024-02-08

## 2024-02-08 DIAGNOSIS — Z95.818 STATUS POST PLACEMENT OF IMPLANTABLE LOOP RECORDER: Primary | ICD-10-CM

## 2024-02-08 DIAGNOSIS — I47.10 SUPRAVENTRICULAR TACHYCARDIA: ICD-10-CM

## 2024-03-12 ENCOUNTER — HOSPITAL ENCOUNTER (OUTPATIENT)
Facility: HOSPITAL | Age: 64
Discharge: HOME | End: 2024-03-15
Payer: COMMERCIAL

## 2024-03-12 ENCOUNTER — TRANSCRIBE ORDERS (OUTPATIENT)
Facility: HOSPITAL | Age: 64
End: 2024-03-12

## 2024-03-12 DIAGNOSIS — E55.9 VITAMIN D DEFICIENCY: ICD-10-CM

## 2024-03-12 DIAGNOSIS — I10 HYPERTENSION, ESSENTIAL: ICD-10-CM

## 2024-03-12 DIAGNOSIS — M15.3 POST-TRAUMATIC OSTEOARTHRITIS OF MULTIPLE JOINTS: ICD-10-CM

## 2024-03-12 DIAGNOSIS — I48.91 ATRIAL FIBRILLATION, UNSPECIFIED TYPE (HCC): ICD-10-CM

## 2024-03-12 DIAGNOSIS — D50.9 IRON DEFICIENCY ANEMIA, UNSPECIFIED IRON DEFICIENCY ANEMIA TYPE: ICD-10-CM

## 2024-03-12 DIAGNOSIS — I10 HYPERTENSION, ESSENTIAL: Primary | ICD-10-CM

## 2024-03-12 DIAGNOSIS — E78.00 PURE HYPERCHOLESTEROLEMIA: ICD-10-CM

## 2024-03-12 LAB
ALBUMIN SERPL-MCNC: 3.5 G/DL (ref 3.4–5)
ALBUMIN/GLOB SERPL: 1 (ref 0.8–1.7)
ALP SERPL-CCNC: 112 U/L (ref 45–117)
ALT SERPL-CCNC: 78 U/L (ref 13–56)
ANION GAP SERPL CALC-SCNC: 5 MMOL/L (ref 3–18)
AST SERPL-CCNC: 52 U/L (ref 10–38)
BASOPHILS # BLD: 0.1 K/UL (ref 0–0.1)
BASOPHILS NFR BLD: 1 % (ref 0–2)
BILIRUB SERPL-MCNC: 1 MG/DL (ref 0.2–1)
BUN SERPL-MCNC: 18 MG/DL (ref 7–18)
BUN/CREAT SERPL: 20 (ref 12–20)
CALCIUM SERPL-MCNC: 8.9 MG/DL (ref 8.5–10.1)
CHLORIDE SERPL-SCNC: 115 MMOL/L (ref 100–111)
CHOLEST SERPL-MCNC: 151 MG/DL
CO2 SERPL-SCNC: 25 MMOL/L (ref 21–32)
CREAT SERPL-MCNC: 0.91 MG/DL (ref 0.6–1.3)
DIFFERENTIAL METHOD BLD: ABNORMAL
EOSINOPHIL # BLD: 0.6 K/UL (ref 0–0.4)
EOSINOPHIL NFR BLD: 15 % (ref 0–5)
ERYTHROCYTE [DISTWIDTH] IN BLOOD BY AUTOMATED COUNT: 14.6 % (ref 11.6–14.5)
GLOBULIN SER CALC-MCNC: 3.5 G/DL (ref 2–4)
GLUCOSE SERPL-MCNC: 78 MG/DL (ref 74–99)
HCT VFR BLD AUTO: 36.9 % (ref 35–45)
HDLC SERPL-MCNC: 68 MG/DL (ref 40–60)
HDLC SERPL: 2.2 (ref 0–5)
HGB BLD-MCNC: 11.8 G/DL (ref 12–16)
IMM GRANULOCYTES # BLD AUTO: 0 K/UL (ref 0–0.04)
IMM GRANULOCYTES NFR BLD AUTO: 0 % (ref 0–0.5)
LDLC SERPL CALC-MCNC: 76 MG/DL (ref 0–100)
LIPID PANEL: ABNORMAL
LYMPHOCYTES # BLD: 1.7 K/UL (ref 0.9–3.6)
LYMPHOCYTES NFR BLD: 44 % (ref 21–52)
MCH RBC QN AUTO: 29.9 PG (ref 24–34)
MCHC RBC AUTO-ENTMCNC: 32 G/DL (ref 31–37)
MCV RBC AUTO: 93.7 FL (ref 78–100)
MONOCYTES # BLD: 0.3 K/UL (ref 0.05–1.2)
MONOCYTES NFR BLD: 7 % (ref 3–10)
NEUTS SEG # BLD: 1.3 K/UL (ref 1.8–8)
NEUTS SEG NFR BLD: 33 % (ref 40–73)
NRBC # BLD: 0 K/UL (ref 0–0.01)
NRBC BLD-RTO: 0 PER 100 WBC
PLATELET # BLD AUTO: 175 K/UL (ref 135–420)
PMV BLD AUTO: 11.1 FL (ref 9.2–11.8)
POTASSIUM SERPL-SCNC: 4.3 MMOL/L (ref 3.5–5.5)
PROT SERPL-MCNC: 7 G/DL (ref 6.4–8.2)
RBC # BLD AUTO: 3.94 M/UL (ref 4.2–5.3)
SODIUM SERPL-SCNC: 145 MMOL/L (ref 136–145)
TRIGL SERPL-MCNC: 35 MG/DL
TSH SERPL DL<=0.05 MIU/L-ACNC: 1.78 UIU/ML (ref 0.36–3.74)
VLDLC SERPL CALC-MCNC: 7 MG/DL
WBC # BLD AUTO: 3.9 K/UL (ref 4.6–13.2)

## 2024-03-12 PROCEDURE — 85025 COMPLETE CBC W/AUTO DIFF WBC: CPT

## 2024-03-12 PROCEDURE — 80061 LIPID PANEL: CPT

## 2024-03-12 PROCEDURE — 80053 COMPREHEN METABOLIC PANEL: CPT

## 2024-03-12 PROCEDURE — 84436 ASSAY OF TOTAL THYROXINE: CPT

## 2024-03-12 PROCEDURE — 36415 COLL VENOUS BLD VENIPUNCTURE: CPT

## 2024-03-12 PROCEDURE — 84443 ASSAY THYROID STIM HORMONE: CPT

## 2024-03-13 LAB — T4 SERPL-MCNC: 10.3 UG/DL (ref 4.8–13.9)

## 2024-03-14 ENCOUNTER — NURSE ONLY (OUTPATIENT)
Age: 64
End: 2024-03-14
Payer: COMMERCIAL

## 2024-03-14 ENCOUNTER — OFFICE VISIT (OUTPATIENT)
Age: 64
End: 2024-03-14
Payer: COMMERCIAL

## 2024-03-14 VITALS
OXYGEN SATURATION: 95 % | BODY MASS INDEX: 46.93 KG/M2 | WEIGHT: 255 LBS | HEIGHT: 62 IN | DIASTOLIC BLOOD PRESSURE: 72 MMHG | SYSTOLIC BLOOD PRESSURE: 130 MMHG

## 2024-03-14 DIAGNOSIS — Z95.818 STATUS POST PLACEMENT OF IMPLANTABLE LOOP RECORDER: Primary | ICD-10-CM

## 2024-03-14 DIAGNOSIS — I47.10 SUPRAVENTRICULAR TACHYCARDIA: ICD-10-CM

## 2024-03-14 DIAGNOSIS — R00.2 PALPITATIONS: ICD-10-CM

## 2024-03-14 DIAGNOSIS — I48.0 PAROXYSMAL ATRIAL FIBRILLATION (HCC): ICD-10-CM

## 2024-03-14 PROCEDURE — 99214 OFFICE O/P EST MOD 30 MIN: CPT | Performed by: INTERNAL MEDICINE

## 2024-03-14 PROCEDURE — 3078F DIAST BP <80 MM HG: CPT | Performed by: INTERNAL MEDICINE

## 2024-03-14 PROCEDURE — 93000 ELECTROCARDIOGRAM COMPLETE: CPT | Performed by: INTERNAL MEDICINE

## 2024-03-14 PROCEDURE — 3075F SYST BP GE 130 - 139MM HG: CPT | Performed by: INTERNAL MEDICINE

## 2024-03-14 RX ORDER — AMLODIPINE BESYLATE 10 MG/1
10 TABLET ORAL DAILY
Qty: 90 TABLET | Refills: 2 | Status: SHIPPED | OUTPATIENT
Start: 2024-03-14

## 2024-03-14 RX ORDER — ATORVASTATIN CALCIUM 20 MG/1
20 TABLET, FILM COATED ORAL DAILY
COMMUNITY
Start: 2024-03-11

## 2024-03-14 RX ORDER — FLECAINIDE ACETATE 50 MG/1
50 TABLET ORAL 2 TIMES DAILY
Qty: 180 TABLET | Refills: 2 | Status: SHIPPED | OUTPATIENT
Start: 2024-03-14

## 2024-03-14 RX ORDER — AMLODIPINE BESYLATE 10 MG/1
10 TABLET ORAL DAILY
COMMUNITY
Start: 2024-03-11 | End: 2024-03-14 | Stop reason: SDUPTHER

## 2024-03-14 RX ORDER — FLECAINIDE ACETATE 100 MG/1
50 TABLET ORAL 2 TIMES DAILY
COMMUNITY
Start: 2024-03-11 | End: 2024-03-14

## 2024-03-14 RX ORDER — METOPROLOL TARTRATE 50 MG/1
50 TABLET, FILM COATED ORAL 2 TIMES DAILY
Qty: 180 TABLET | Refills: 2 | Status: SHIPPED | OUTPATIENT
Start: 2024-03-14

## 2024-03-14 NOTE — PROGRESS NOTES
09/01/22 64     General:   Well developed, well groomed.    Head/Neck:   No obvious jugular venous distention     No obvious carotid pulsations.      No evidence of xanthelasma.  Lungs:   No respiratory distress.      Clear bilaterally.  Heart:  Regular rate and rhythm.    Abdomen:   Soft and nontender  Extremities:   Intact peripheral pulses.      No significant edema.    Neurological:   Alert and oriented to person, place, time.      No focal neurological deficit visually.  Skin:   No obvious rash

## 2024-03-15 PROCEDURE — 93285 PRGRMG DEV EVAL SCRMS IP: CPT | Performed by: INTERNAL MEDICINE

## 2024-06-04 ENCOUNTER — NURSE ONLY (OUTPATIENT)
Age: 64
End: 2024-06-04

## 2024-06-04 DIAGNOSIS — R00.2 PALPITATIONS: ICD-10-CM

## 2024-06-04 DIAGNOSIS — I48.0 PAROXYSMAL ATRIAL FIBRILLATION (HCC): ICD-10-CM

## 2024-06-04 DIAGNOSIS — Z95.818 STATUS POST PLACEMENT OF IMPLANTABLE LOOP RECORDER: Primary | ICD-10-CM

## 2024-06-04 DIAGNOSIS — I47.10 SUPRAVENTRICULAR TACHYCARDIA (HCC): ICD-10-CM

## 2024-09-12 ENCOUNTER — NURSE ONLY (OUTPATIENT)
Age: 64
End: 2024-09-12

## 2024-09-12 ENCOUNTER — OFFICE VISIT (OUTPATIENT)
Age: 64
End: 2024-09-12

## 2024-09-12 VITALS
HEART RATE: 64 BPM | BODY MASS INDEX: 48.1 KG/M2 | OXYGEN SATURATION: 100 % | SYSTOLIC BLOOD PRESSURE: 124 MMHG | WEIGHT: 263 LBS | DIASTOLIC BLOOD PRESSURE: 84 MMHG

## 2024-09-12 DIAGNOSIS — I10 PRIMARY HYPERTENSION: ICD-10-CM

## 2024-09-12 DIAGNOSIS — I48.91 ATRIAL FIBRILLATION, UNSPECIFIED TYPE (HCC): Primary | ICD-10-CM

## 2024-09-12 DIAGNOSIS — R00.2 PALPITATIONS: ICD-10-CM

## 2024-09-12 DIAGNOSIS — I47.10 SUPRAVENTRICULAR TACHYCARDIA (HCC): ICD-10-CM

## 2024-09-12 DIAGNOSIS — Z95.818 STATUS POST PLACEMENT OF IMPLANTABLE LOOP RECORDER: Primary | ICD-10-CM

## 2024-09-20 ENCOUNTER — APPOINTMENT (OUTPATIENT)
Facility: HOSPITAL | Age: 64
DRG: 378 | End: 2024-09-20

## 2024-09-20 ENCOUNTER — HOSPITAL ENCOUNTER (INPATIENT)
Facility: HOSPITAL | Age: 64
LOS: 2 days | Discharge: HOME | DRG: 378 | End: 2024-09-22
Attending: STUDENT IN AN ORGANIZED HEALTH CARE EDUCATION/TRAINING PROGRAM | Admitting: STUDENT IN AN ORGANIZED HEALTH CARE EDUCATION/TRAINING PROGRAM

## 2024-09-20 DIAGNOSIS — D64.9 ANEMIA, UNSPECIFIED TYPE: ICD-10-CM

## 2024-09-20 DIAGNOSIS — R79.89 ELEVATED LFTS: ICD-10-CM

## 2024-09-20 DIAGNOSIS — K92.1 MELENA: Primary | ICD-10-CM

## 2024-09-20 PROBLEM — R74.8 ELEVATED LIVER ENZYMES: Status: ACTIVE | Noted: 2024-09-20

## 2024-09-20 PROBLEM — I48.0 PAROXYSMAL A-FIB (HCC): Status: ACTIVE | Noted: 2018-04-06

## 2024-09-20 PROBLEM — M48.00 SPINAL STENOSIS: Status: ACTIVE | Noted: 2024-09-20

## 2024-09-20 PROBLEM — K92.2 GI BLEED: Status: ACTIVE | Noted: 2024-09-20

## 2024-09-20 LAB
ALBUMIN SERPL-MCNC: 3.6 G/DL (ref 3.4–5)
ALBUMIN/GLOB SERPL: 1 (ref 0.8–1.7)
ALP SERPL-CCNC: 133 U/L (ref 45–117)
ALT SERPL-CCNC: 128 U/L (ref 13–56)
ANION GAP SERPL CALC-SCNC: 4 MMOL/L (ref 3–18)
APPEARANCE UR: CLEAR
AST SERPL-CCNC: 74 U/L (ref 10–38)
BACTERIA URNS QL MICRO: NEGATIVE /HPF
BASOPHILS # BLD: 0.1 K/UL (ref 0–0.1)
BASOPHILS NFR BLD: 1 % (ref 0–2)
BILIRUB SERPL-MCNC: 0.8 MG/DL (ref 0.2–1)
BILIRUB UR QL: NEGATIVE
BUN SERPL-MCNC: 15 MG/DL (ref 7–18)
BUN/CREAT SERPL: 13 (ref 12–20)
CALCIUM SERPL-MCNC: 8.9 MG/DL (ref 8.5–10.1)
CHLORIDE SERPL-SCNC: 113 MMOL/L (ref 100–111)
CO2 SERPL-SCNC: 22 MMOL/L (ref 21–32)
COLOR UR: YELLOW
CREAT SERPL-MCNC: 1.16 MG/DL (ref 0.6–1.3)
DIFFERENTIAL METHOD BLD: ABNORMAL
EOSINOPHIL # BLD: 0.5 K/UL (ref 0–0.4)
EOSINOPHIL NFR BLD: 15 % (ref 0–5)
EPITH CASTS URNS QL MICRO: NORMAL /LPF (ref 0–5)
ERYTHROCYTE [DISTWIDTH] IN BLOOD BY AUTOMATED COUNT: 12.3 % (ref 11.6–14.5)
FERRITIN SERPL-MCNC: 7 NG/ML (ref 8–388)
GLOBULIN SER CALC-MCNC: 3.6 G/DL (ref 2–4)
GLUCOSE SERPL-MCNC: 88 MG/DL (ref 74–99)
GLUCOSE UR STRIP.AUTO-MCNC: NEGATIVE MG/DL
HCT VFR BLD AUTO: 32.3 % (ref 35–45)
HEMOCCULT STL QL: POSITIVE
HGB BLD-MCNC: 10.6 G/DL (ref 12–16)
HGB UR QL STRIP: NEGATIVE
IMM GRANULOCYTES # BLD AUTO: 0 K/UL (ref 0–0.04)
IMM GRANULOCYTES NFR BLD AUTO: 0 % (ref 0–0.5)
INR PPP: 1.2 (ref 0.9–1.1)
IRON SATN MFR SERPL: 9 % (ref 20–50)
IRON SERPL-MCNC: 38 UG/DL (ref 50–175)
KETONES UR QL STRIP.AUTO: NEGATIVE MG/DL
LEUKOCYTE ESTERASE UR QL STRIP.AUTO: ABNORMAL
LYMPHOCYTES # BLD: 1.5 K/UL (ref 0.9–3.6)
LYMPHOCYTES NFR BLD: 40 % (ref 21–52)
MCH RBC QN AUTO: 31 PG (ref 24–34)
MCHC RBC AUTO-ENTMCNC: 32.8 G/DL (ref 31–37)
MCV RBC AUTO: 94.4 FL (ref 78–100)
MONOCYTES # BLD: 0.2 K/UL (ref 0.05–1.2)
MONOCYTES NFR BLD: 7 % (ref 3–10)
NEUTS SEG # BLD: 1.3 K/UL (ref 1.8–8)
NEUTS SEG NFR BLD: 37 % (ref 40–73)
NITRITE UR QL STRIP.AUTO: NEGATIVE
NRBC # BLD: 0 K/UL (ref 0–0.01)
NRBC BLD-RTO: 0 PER 100 WBC
PH UR STRIP: 8.5 (ref 5–8)
PLATELET # BLD AUTO: 198 K/UL (ref 135–420)
PMV BLD AUTO: 10.3 FL (ref 9.2–11.8)
POTASSIUM SERPL-SCNC: 4 MMOL/L (ref 3.5–5.5)
PROT SERPL-MCNC: 7.2 G/DL (ref 6.4–8.2)
PROT UR STRIP-MCNC: NEGATIVE MG/DL
PROTHROMBIN TIME: 15.8 SEC (ref 11.9–14.9)
RBC # BLD AUTO: 3.42 M/UL (ref 4.2–5.3)
RBC #/AREA URNS HPF: NORMAL /HPF (ref 0–5)
SODIUM SERPL-SCNC: 139 MMOL/L (ref 136–145)
SP GR UR REFRACTOMETRY: >1.03 (ref 1–1.03)
TIBC SERPL-MCNC: 421 UG/DL (ref 250–450)
UROBILINOGEN UR QL STRIP.AUTO: 0.2 EU/DL (ref 0.2–1)
WBC # BLD AUTO: 3.6 K/UL (ref 4.6–13.2)
WBC URNS QL MICRO: NORMAL /HPF (ref 0–4)

## 2024-09-20 PROCEDURE — 83550 IRON BINDING TEST: CPT

## 2024-09-20 PROCEDURE — 99223 1ST HOSP IP/OBS HIGH 75: CPT | Performed by: STUDENT IN AN ORGANIZED HEALTH CARE EDUCATION/TRAINING PROGRAM

## 2024-09-20 PROCEDURE — 80053 COMPREHEN METABOLIC PANEL: CPT

## 2024-09-20 PROCEDURE — 74174 CTA ABD&PLVS W/CONTRAST: CPT

## 2024-09-20 PROCEDURE — 82272 OCCULT BLD FECES 1-3 TESTS: CPT

## 2024-09-20 PROCEDURE — 94761 N-INVAS EAR/PLS OXIMETRY MLT: CPT

## 2024-09-20 PROCEDURE — 2580000003 HC RX 258: Performed by: PHYSICIAN ASSISTANT

## 2024-09-20 PROCEDURE — 6360000002 HC RX W HCPCS: Performed by: STUDENT IN AN ORGANIZED HEALTH CARE EDUCATION/TRAINING PROGRAM

## 2024-09-20 PROCEDURE — 6360000004 HC RX CONTRAST MEDICATION: Performed by: PHYSICIAN ASSISTANT

## 2024-09-20 PROCEDURE — 6360000002 HC RX W HCPCS: Performed by: PHYSICIAN ASSISTANT

## 2024-09-20 PROCEDURE — 82728 ASSAY OF FERRITIN: CPT

## 2024-09-20 PROCEDURE — 96374 THER/PROPH/DIAG INJ IV PUSH: CPT

## 2024-09-20 PROCEDURE — 6370000000 HC RX 637 (ALT 250 FOR IP): Performed by: STUDENT IN AN ORGANIZED HEALTH CARE EDUCATION/TRAINING PROGRAM

## 2024-09-20 PROCEDURE — 81001 URINALYSIS AUTO W/SCOPE: CPT

## 2024-09-20 PROCEDURE — 1100000000 HC RM PRIVATE

## 2024-09-20 PROCEDURE — 85025 COMPLETE CBC W/AUTO DIFF WBC: CPT

## 2024-09-20 PROCEDURE — 2580000003 HC RX 258: Performed by: STUDENT IN AN ORGANIZED HEALTH CARE EDUCATION/TRAINING PROGRAM

## 2024-09-20 PROCEDURE — 83540 ASSAY OF IRON: CPT

## 2024-09-20 PROCEDURE — 99285 EMERGENCY DEPT VISIT HI MDM: CPT

## 2024-09-20 PROCEDURE — 85610 PROTHROMBIN TIME: CPT

## 2024-09-20 PROCEDURE — 80074 ACUTE HEPATITIS PANEL: CPT

## 2024-09-20 RX ORDER — MECOBALAMIN 5000 MCG
5 TABLET,DISINTEGRATING ORAL NIGHTLY PRN
Status: DISCONTINUED | OUTPATIENT
Start: 2024-09-20 | End: 2024-09-22 | Stop reason: HOSPADM

## 2024-09-20 RX ORDER — ACETAMINOPHEN 650 MG/1
650 SUPPOSITORY RECTAL EVERY 6 HOURS PRN
Status: DISCONTINUED | OUTPATIENT
Start: 2024-09-20 | End: 2024-09-22 | Stop reason: HOSPADM

## 2024-09-20 RX ORDER — BENZONATATE 100 MG/1
100 CAPSULE ORAL 3 TIMES DAILY PRN
Status: DISCONTINUED | OUTPATIENT
Start: 2024-09-20 | End: 2024-09-22 | Stop reason: HOSPADM

## 2024-09-20 RX ORDER — SODIUM CHLORIDE 9 MG/ML
INJECTION, SOLUTION INTRAVENOUS PRN
Status: DISCONTINUED | OUTPATIENT
Start: 2024-09-20 | End: 2024-09-22 | Stop reason: HOSPADM

## 2024-09-20 RX ORDER — FLECAINIDE ACETATE 100 MG/1
50 TABLET ORAL 2 TIMES DAILY
Status: DISCONTINUED | OUTPATIENT
Start: 2024-09-20 | End: 2024-09-22 | Stop reason: HOSPADM

## 2024-09-20 RX ORDER — ONDANSETRON 2 MG/ML
4 INJECTION INTRAMUSCULAR; INTRAVENOUS EVERY 6 HOURS PRN
Status: DISCONTINUED | OUTPATIENT
Start: 2024-09-20 | End: 2024-09-22 | Stop reason: HOSPADM

## 2024-09-20 RX ORDER — SODIUM CHLORIDE 9 MG/ML
INJECTION, SOLUTION INTRAVENOUS CONTINUOUS
Status: DISCONTINUED | OUTPATIENT
Start: 2024-09-20 | End: 2024-09-22

## 2024-09-20 RX ORDER — ONDANSETRON 4 MG/1
4 TABLET, ORALLY DISINTEGRATING ORAL EVERY 8 HOURS PRN
Status: DISCONTINUED | OUTPATIENT
Start: 2024-09-20 | End: 2024-09-22 | Stop reason: HOSPADM

## 2024-09-20 RX ORDER — MECLIZINE HCL 12.5 MG 12.5 MG/1
25 TABLET ORAL 3 TIMES DAILY PRN
Status: DISCONTINUED | OUTPATIENT
Start: 2024-09-20 | End: 2024-09-22 | Stop reason: HOSPADM

## 2024-09-20 RX ORDER — SODIUM CHLORIDE 0.9 % (FLUSH) 0.9 %
5-40 SYRINGE (ML) INJECTION EVERY 12 HOURS SCHEDULED
Status: DISCONTINUED | OUTPATIENT
Start: 2024-09-20 | End: 2024-09-22 | Stop reason: HOSPADM

## 2024-09-20 RX ORDER — IOPAMIDOL 755 MG/ML
100 INJECTION, SOLUTION INTRAVASCULAR
Status: COMPLETED | OUTPATIENT
Start: 2024-09-20 | End: 2024-09-20

## 2024-09-20 RX ORDER — SODIUM CHLORIDE 9 MG/ML
INJECTION, SOLUTION INTRAVENOUS CONTINUOUS
Status: DISCONTINUED | OUTPATIENT
Start: 2024-09-20 | End: 2024-09-20

## 2024-09-20 RX ORDER — ACETAMINOPHEN 325 MG/1
650 TABLET ORAL EVERY 6 HOURS PRN
Status: DISCONTINUED | OUTPATIENT
Start: 2024-09-20 | End: 2024-09-22 | Stop reason: HOSPADM

## 2024-09-20 RX ORDER — SODIUM CHLORIDE 0.9 % (FLUSH) 0.9 %
5-40 SYRINGE (ML) INJECTION PRN
Status: DISCONTINUED | OUTPATIENT
Start: 2024-09-20 | End: 2024-09-22 | Stop reason: HOSPADM

## 2024-09-20 RX ORDER — CYCLOBENZAPRINE HCL 10 MG
10 TABLET ORAL 3 TIMES DAILY PRN
Status: DISCONTINUED | OUTPATIENT
Start: 2024-09-20 | End: 2024-09-22 | Stop reason: HOSPADM

## 2024-09-20 RX ADMIN — PANTOPRAZOLE SODIUM 40 MG: 40 INJECTION, POWDER, FOR SOLUTION INTRAVENOUS at 15:14

## 2024-09-20 RX ADMIN — SODIUM CHLORIDE, PRESERVATIVE FREE 10 ML: 5 INJECTION INTRAVENOUS at 23:05

## 2024-09-20 RX ADMIN — FLECAINIDE ACETATE 50 MG: 100 TABLET ORAL at 22:57

## 2024-09-20 RX ADMIN — SODIUM CHLORIDE: 9 INJECTION, SOLUTION INTRAVENOUS at 23:03

## 2024-09-20 RX ADMIN — FLECAINIDE ACETATE 50 MG: 100 TABLET ORAL at 15:43

## 2024-09-20 RX ADMIN — SODIUM CHLORIDE: 9 INJECTION, SOLUTION INTRAVENOUS at 09:14

## 2024-09-20 RX ADMIN — PANTOPRAZOLE SODIUM 40 MG: 40 INJECTION, POWDER, FOR SOLUTION INTRAVENOUS at 09:11

## 2024-09-20 RX ADMIN — IOPAMIDOL 100 ML: 755 INJECTION, SOLUTION INTRAVENOUS at 09:46

## 2024-09-20 ASSESSMENT — PAIN SCALES - GENERAL
PAINLEVEL_OUTOF10: 0

## 2024-09-20 ASSESSMENT — PAIN - FUNCTIONAL ASSESSMENT: PAIN_FUNCTIONAL_ASSESSMENT: NONE - DENIES PAIN

## 2024-09-21 ENCOUNTER — HOSPITAL ENCOUNTER (INPATIENT)
Facility: HOSPITAL | Age: 64
Discharge: HOME | DRG: 378 | End: 2024-09-24

## 2024-09-21 LAB
ALBUMIN SERPL-MCNC: 3.6 G/DL (ref 3.4–5)
ALBUMIN/GLOB SERPL: 0.9 (ref 0.8–1.7)
ALP SERPL-CCNC: 121 U/L (ref 45–117)
ALT SERPL-CCNC: 104 U/L (ref 13–56)
ANION GAP SERPL CALC-SCNC: 7 MMOL/L (ref 3–18)
APTT PPP: 25 SEC (ref 23–36.4)
AST SERPL-CCNC: 50 U/L (ref 10–38)
BILIRUB SERPL-MCNC: 1.5 MG/DL (ref 0.2–1)
BUN SERPL-MCNC: 11 MG/DL (ref 7–18)
BUN/CREAT SERPL: 11 (ref 12–20)
CALCIUM SERPL-MCNC: 9.2 MG/DL (ref 8.5–10.1)
CHLORIDE SERPL-SCNC: 113 MMOL/L (ref 100–111)
CO2 SERPL-SCNC: 21 MMOL/L (ref 21–32)
CREAT SERPL-MCNC: 0.98 MG/DL (ref 0.6–1.3)
ERYTHROCYTE [DISTWIDTH] IN BLOOD BY AUTOMATED COUNT: 12.4 % (ref 11.6–14.5)
GLOBULIN SER CALC-MCNC: 3.8 G/DL (ref 2–4)
GLUCOSE SERPL-MCNC: 82 MG/DL (ref 74–99)
HCT VFR BLD AUTO: 32.3 % (ref 35–45)
HGB BLD-MCNC: 10.8 G/DL (ref 12–16)
INR PPP: 1 (ref 0.9–1.1)
MCH RBC QN AUTO: 31.8 PG (ref 24–34)
MCHC RBC AUTO-ENTMCNC: 33.4 G/DL (ref 31–37)
MCV RBC AUTO: 95 FL (ref 78–100)
NRBC # BLD: 0 K/UL (ref 0–0.01)
NRBC BLD-RTO: 0 PER 100 WBC
PLATELET # BLD AUTO: 195 K/UL (ref 135–420)
PMV BLD AUTO: 10.9 FL (ref 9.2–11.8)
POTASSIUM SERPL-SCNC: 3.8 MMOL/L (ref 3.5–5.5)
PROT SERPL-MCNC: 7.4 G/DL (ref 6.4–8.2)
PROTHROMBIN TIME: 13.4 SEC (ref 11.9–14.9)
RBC # BLD AUTO: 3.4 M/UL (ref 4.2–5.3)
SODIUM SERPL-SCNC: 141 MMOL/L (ref 136–145)
WBC # BLD AUTO: 3.4 K/UL (ref 4.6–13.2)

## 2024-09-21 PROCEDURE — 76705 ECHO EXAM OF ABDOMEN: CPT

## 2024-09-21 PROCEDURE — 85730 THROMBOPLASTIN TIME PARTIAL: CPT

## 2024-09-21 PROCEDURE — 99232 SBSQ HOSP IP/OBS MODERATE 35: CPT | Performed by: FAMILY MEDICINE

## 2024-09-21 PROCEDURE — 94761 N-INVAS EAR/PLS OXIMETRY MLT: CPT

## 2024-09-21 PROCEDURE — 85610 PROTHROMBIN TIME: CPT

## 2024-09-21 PROCEDURE — 6360000002 HC RX W HCPCS: Performed by: STUDENT IN AN ORGANIZED HEALTH CARE EDUCATION/TRAINING PROGRAM

## 2024-09-21 PROCEDURE — 36415 COLL VENOUS BLD VENIPUNCTURE: CPT

## 2024-09-21 PROCEDURE — 6370000000 HC RX 637 (ALT 250 FOR IP): Performed by: INTERNAL MEDICINE

## 2024-09-21 PROCEDURE — 1100000000 HC RM PRIVATE

## 2024-09-21 PROCEDURE — 6370000000 HC RX 637 (ALT 250 FOR IP): Performed by: STUDENT IN AN ORGANIZED HEALTH CARE EDUCATION/TRAINING PROGRAM

## 2024-09-21 PROCEDURE — 2580000003 HC RX 258: Performed by: STUDENT IN AN ORGANIZED HEALTH CARE EDUCATION/TRAINING PROGRAM

## 2024-09-21 PROCEDURE — 80053 COMPREHEN METABOLIC PANEL: CPT

## 2024-09-21 PROCEDURE — 85027 COMPLETE CBC AUTOMATED: CPT

## 2024-09-21 RX ADMIN — PANTOPRAZOLE SODIUM 40 MG: 40 INJECTION, POWDER, FOR SOLUTION INTRAVENOUS at 04:30

## 2024-09-21 RX ADMIN — FLECAINIDE ACETATE 50 MG: 100 TABLET ORAL at 10:19

## 2024-09-21 RX ADMIN — FLECAINIDE ACETATE 50 MG: 100 TABLET ORAL at 20:36

## 2024-09-21 RX ADMIN — SODIUM CHLORIDE, PRESERVATIVE FREE 10 ML: 5 INJECTION INTRAVENOUS at 20:36

## 2024-09-21 RX ADMIN — ACETAMINOPHEN 325MG 650 MG: 325 TABLET ORAL at 12:25

## 2024-09-21 RX ADMIN — PANTOPRAZOLE SODIUM 40 MG: 40 INJECTION, POWDER, FOR SOLUTION INTRAVENOUS at 13:34

## 2024-09-21 RX ADMIN — POLYETHYLENE GLYCOL-3350 AND ELECTROLYTES 4000 ML: 236; 6.74; 5.86; 2.97; 22.74 POWDER, FOR SOLUTION ORAL at 17:46

## 2024-09-21 RX ADMIN — SODIUM CHLORIDE, PRESERVATIVE FREE 10 ML: 5 INJECTION INTRAVENOUS at 10:26

## 2024-09-21 ASSESSMENT — PAIN SCALES - GENERAL
PAINLEVEL_OUTOF10: 0
PAINLEVEL_OUTOF10: 4
PAINLEVEL_OUTOF10: 0

## 2024-09-21 ASSESSMENT — PAIN - FUNCTIONAL ASSESSMENT: PAIN_FUNCTIONAL_ASSESSMENT: ACTIVITIES ARE NOT PREVENTED

## 2024-09-21 ASSESSMENT — PAIN DESCRIPTION - DESCRIPTORS: DESCRIPTORS: ACHING

## 2024-09-21 ASSESSMENT — PAIN DESCRIPTION - ORIENTATION: ORIENTATION: MID

## 2024-09-21 ASSESSMENT — PAIN DESCRIPTION - LOCATION: LOCATION: HEAD

## 2024-09-22 ENCOUNTER — ANESTHESIA EVENT (OUTPATIENT)
Facility: HOSPITAL | Age: 64
End: 2024-09-22

## 2024-09-22 ENCOUNTER — ANESTHESIA (OUTPATIENT)
Facility: HOSPITAL | Age: 64
End: 2024-09-22

## 2024-09-22 VITALS
OXYGEN SATURATION: 100 % | WEIGHT: 257 LBS | DIASTOLIC BLOOD PRESSURE: 89 MMHG | BODY MASS INDEX: 47.29 KG/M2 | HEIGHT: 62 IN | TEMPERATURE: 97.3 F | HEART RATE: 83 BPM | SYSTOLIC BLOOD PRESSURE: 119 MMHG | RESPIRATION RATE: 25 BRPM

## 2024-09-22 LAB
ALBUMIN SERPL-MCNC: 3.8 G/DL (ref 3.4–5)
ALBUMIN/GLOB SERPL: 1.1 (ref 0.8–1.7)
ALP SERPL-CCNC: 114 U/L (ref 45–117)
ALT SERPL-CCNC: 87 U/L (ref 13–56)
ANION GAP SERPL CALC-SCNC: 9 MMOL/L (ref 3–18)
AST SERPL-CCNC: 43 U/L (ref 10–38)
BILIRUB SERPL-MCNC: 1.5 MG/DL (ref 0.2–1)
BUN SERPL-MCNC: 12 MG/DL (ref 7–18)
BUN/CREAT SERPL: 11 (ref 12–20)
CALCIUM SERPL-MCNC: 9.2 MG/DL (ref 8.5–10.1)
CHLORIDE SERPL-SCNC: 111 MMOL/L (ref 100–111)
CO2 SERPL-SCNC: 20 MMOL/L (ref 21–32)
CREAT SERPL-MCNC: 1.08 MG/DL (ref 0.6–1.3)
ERYTHROCYTE [DISTWIDTH] IN BLOOD BY AUTOMATED COUNT: 12.2 % (ref 11.6–14.5)
GLOBULIN SER CALC-MCNC: 3.4 G/DL (ref 2–4)
GLUCOSE SERPL-MCNC: 78 MG/DL (ref 74–99)
HCT VFR BLD AUTO: 34.2 % (ref 35–45)
HGB BLD-MCNC: 11.2 G/DL (ref 12–16)
MCH RBC QN AUTO: 31.2 PG (ref 24–34)
MCHC RBC AUTO-ENTMCNC: 32.7 G/DL (ref 31–37)
MCV RBC AUTO: 95.3 FL (ref 78–100)
NRBC # BLD: 0 K/UL (ref 0–0.01)
NRBC BLD-RTO: 0 PER 100 WBC
PLATELET # BLD AUTO: 198 K/UL (ref 135–420)
PMV BLD AUTO: 11.4 FL (ref 9.2–11.8)
POTASSIUM SERPL-SCNC: 3.8 MMOL/L (ref 3.5–5.5)
PROT SERPL-MCNC: 7.2 G/DL (ref 6.4–8.2)
RBC # BLD AUTO: 3.59 M/UL (ref 4.2–5.3)
SODIUM SERPL-SCNC: 140 MMOL/L (ref 136–145)
WBC # BLD AUTO: 3.2 K/UL (ref 4.6–13.2)

## 2024-09-22 PROCEDURE — 2709999900 HC NON-CHARGEABLE SUPPLY: Performed by: INTERNAL MEDICINE

## 2024-09-22 PROCEDURE — 85027 COMPLETE CBC AUTOMATED: CPT

## 2024-09-22 PROCEDURE — 80053 COMPREHEN METABOLIC PANEL: CPT

## 2024-09-22 PROCEDURE — 94761 N-INVAS EAR/PLS OXIMETRY MLT: CPT

## 2024-09-22 PROCEDURE — 2580000003 HC RX 258: Performed by: NURSE ANESTHETIST, CERTIFIED REGISTERED

## 2024-09-22 PROCEDURE — 88305 TISSUE EXAM BY PATHOLOGIST: CPT

## 2024-09-22 PROCEDURE — 36415 COLL VENOUS BLD VENIPUNCTURE: CPT

## 2024-09-22 PROCEDURE — 3600007513: Performed by: INTERNAL MEDICINE

## 2024-09-22 PROCEDURE — 7100000000 HC PACU RECOVERY - FIRST 15 MIN: Performed by: INTERNAL MEDICINE

## 2024-09-22 PROCEDURE — 6360000002 HC RX W HCPCS: Performed by: NURSE ANESTHETIST, CERTIFIED REGISTERED

## 2024-09-22 PROCEDURE — 0DB68ZX EXCISION OF STOMACH, VIA NATURAL OR ARTIFICIAL OPENING ENDOSCOPIC, DIAGNOSTIC: ICD-10-PCS | Performed by: INTERNAL MEDICINE

## 2024-09-22 PROCEDURE — 6370000000 HC RX 637 (ALT 250 FOR IP): Performed by: INTERNAL MEDICINE

## 2024-09-22 PROCEDURE — 2580000003 HC RX 258: Performed by: STUDENT IN AN ORGANIZED HEALTH CARE EDUCATION/TRAINING PROGRAM

## 2024-09-22 PROCEDURE — 3600007503: Performed by: INTERNAL MEDICINE

## 2024-09-22 PROCEDURE — 6370000000 HC RX 637 (ALT 250 FOR IP): Performed by: STUDENT IN AN ORGANIZED HEALTH CARE EDUCATION/TRAINING PROGRAM

## 2024-09-22 PROCEDURE — 7100000001 HC PACU RECOVERY - ADDTL 15 MIN: Performed by: INTERNAL MEDICINE

## 2024-09-22 PROCEDURE — 3700000001 HC ADD 15 MINUTES (ANESTHESIA): Performed by: INTERNAL MEDICINE

## 2024-09-22 PROCEDURE — 6360000002 HC RX W HCPCS: Performed by: STUDENT IN AN ORGANIZED HEALTH CARE EDUCATION/TRAINING PROGRAM

## 2024-09-22 PROCEDURE — 3700000000 HC ANESTHESIA ATTENDED CARE: Performed by: INTERNAL MEDICINE

## 2024-09-22 RX ORDER — ONDANSETRON 2 MG/ML
4 INJECTION INTRAMUSCULAR; INTRAVENOUS
Status: DISCONTINUED | OUTPATIENT
Start: 2024-09-22 | End: 2024-09-22 | Stop reason: HOSPADM

## 2024-09-22 RX ORDER — SODIUM CHLORIDE 9 MG/ML
INJECTION, SOLUTION INTRAVENOUS PRN
Status: DISCONTINUED | OUTPATIENT
Start: 2024-09-22 | End: 2024-09-22 | Stop reason: HOSPADM

## 2024-09-22 RX ORDER — SODIUM CHLORIDE 0.9 % (FLUSH) 0.9 %
5-40 SYRINGE (ML) INJECTION PRN
Status: DISCONTINUED | OUTPATIENT
Start: 2024-09-22 | End: 2024-09-22 | Stop reason: HOSPADM

## 2024-09-22 RX ORDER — NALOXONE HYDROCHLORIDE 0.4 MG/ML
INJECTION, SOLUTION INTRAMUSCULAR; INTRAVENOUS; SUBCUTANEOUS PRN
Status: DISCONTINUED | OUTPATIENT
Start: 2024-09-22 | End: 2024-09-22 | Stop reason: HOSPADM

## 2024-09-22 RX ORDER — SIMETHICONE 40MG/0.6ML
SUSPENSION, DROPS(FINAL DOSAGE FORM)(ML) ORAL PRN
Status: DISCONTINUED | OUTPATIENT
Start: 2024-09-22 | End: 2024-09-22 | Stop reason: ALTCHOICE

## 2024-09-22 RX ORDER — SODIUM CHLORIDE 0.9 % (FLUSH) 0.9 %
5-40 SYRINGE (ML) INJECTION EVERY 12 HOURS SCHEDULED
Status: DISCONTINUED | OUTPATIENT
Start: 2024-09-22 | End: 2024-09-22 | Stop reason: HOSPADM

## 2024-09-22 RX ORDER — PANTOPRAZOLE SODIUM 20 MG/1
20 TABLET, DELAYED RELEASE ORAL
Qty: 30 TABLET | Refills: 0 | Status: SHIPPED | OUTPATIENT
Start: 2024-09-22

## 2024-09-22 RX ORDER — SODIUM CHLORIDE, SODIUM LACTATE, POTASSIUM CHLORIDE, CALCIUM CHLORIDE 600; 310; 30; 20 MG/100ML; MG/100ML; MG/100ML; MG/100ML
INJECTION, SOLUTION INTRAVENOUS CONTINUOUS
Status: DISCONTINUED | OUTPATIENT
Start: 2024-09-22 | End: 2024-09-22 | Stop reason: HOSPADM

## 2024-09-22 RX ORDER — PROPOFOL 10 MG/ML
INJECTION, EMULSION INTRAVENOUS
Status: DISCONTINUED | OUTPATIENT
Start: 2024-09-22 | End: 2024-09-22 | Stop reason: SDUPTHER

## 2024-09-22 RX ADMIN — PANTOPRAZOLE SODIUM 40 MG: 40 INJECTION, POWDER, FOR SOLUTION INTRAVENOUS at 14:12

## 2024-09-22 RX ADMIN — PROPOFOL 20 MG: 10 INJECTION, EMULSION INTRAVENOUS at 12:16

## 2024-09-22 RX ADMIN — PROPOFOL 80 MG: 10 INJECTION, EMULSION INTRAVENOUS at 12:14

## 2024-09-22 RX ADMIN — PROPOFOL 20 MG: 10 INJECTION, EMULSION INTRAVENOUS at 12:15

## 2024-09-22 RX ADMIN — PROPOFOL 30 MG: 10 INJECTION, EMULSION INTRAVENOUS at 12:30

## 2024-09-22 RX ADMIN — BENZONATATE 100 MG: 100 CAPSULE ORAL at 14:12

## 2024-09-22 RX ADMIN — FLECAINIDE ACETATE 50 MG: 100 TABLET ORAL at 14:12

## 2024-09-22 RX ADMIN — PANTOPRAZOLE SODIUM 40 MG: 40 INJECTION, POWDER, FOR SOLUTION INTRAVENOUS at 02:26

## 2024-09-22 RX ADMIN — ACETAMINOPHEN 325MG 650 MG: 325 TABLET ORAL at 14:17

## 2024-09-22 RX ADMIN — CYCLOBENZAPRINE 10 MG: 10 TABLET, FILM COATED ORAL at 14:12

## 2024-09-22 RX ADMIN — PROPOFOL 30 MG: 10 INJECTION, EMULSION INTRAVENOUS at 12:20

## 2024-09-22 RX ADMIN — SODIUM CHLORIDE, POTASSIUM CHLORIDE, SODIUM LACTATE AND CALCIUM CHLORIDE: 600; 310; 30; 20 INJECTION, SOLUTION INTRAVENOUS at 10:02

## 2024-09-22 RX ADMIN — MECLIZINE 25 MG: 12.5 TABLET ORAL at 14:12

## 2024-09-22 RX ADMIN — PROPOFOL 30 MG: 10 INJECTION, EMULSION INTRAVENOUS at 12:22

## 2024-09-22 ASSESSMENT — PAIN SCALES - GENERAL
PAINLEVEL_OUTOF10: 0
PAINLEVEL_OUTOF10: 4
PAINLEVEL_OUTOF10: 4
PAINLEVEL_OUTOF10: 0

## 2024-09-22 ASSESSMENT — PAIN - FUNCTIONAL ASSESSMENT
PAIN_FUNCTIONAL_ASSESSMENT: ACTIVITIES ARE NOT PREVENTED
PAIN_FUNCTIONAL_ASSESSMENT: ACTIVITIES ARE NOT PREVENTED
PAIN_FUNCTIONAL_ASSESSMENT: NONE - DENIES PAIN

## 2024-09-22 ASSESSMENT — PAIN DESCRIPTION - LOCATION
LOCATION: ABDOMEN
LOCATION: ABDOMEN

## 2024-09-24 LAB
-: NORMAL
HAV IGM SER QL: NEGATIVE
HBV CORE IGM SER QL: NEGATIVE
HBV SURFACE AG SER QL: <0.1 INDEX
HBV SURFACE AG SER QL: NEGATIVE
HCV AB SER IA-ACNC: 0.16 INDEX
HCV AB SERPL QL IA: NEGATIVE
HEPATITIS C COMMENT: NORMAL

## 2025-02-05 ENCOUNTER — HOSPITAL ENCOUNTER (OUTPATIENT)
Facility: HOSPITAL | Age: 65
Discharge: HOME | End: 2025-02-08
Payer: MEDICAID

## 2025-02-05 VITALS — HEIGHT: 62 IN | BODY MASS INDEX: 47.3 KG/M2 | WEIGHT: 257.06 LBS

## 2025-02-05 DIAGNOSIS — Z12.31 VISIT FOR SCREENING MAMMOGRAM: ICD-10-CM

## 2025-02-05 PROCEDURE — 77067 SCR MAMMO BI INCL CAD: CPT

## 2025-03-26 ENCOUNTER — OFFICE VISIT (OUTPATIENT)
Age: 65
End: 2025-03-26
Payer: MEDICAID

## 2025-03-26 VITALS
HEART RATE: 64 BPM | SYSTOLIC BLOOD PRESSURE: 120 MMHG | DIASTOLIC BLOOD PRESSURE: 74 MMHG | OXYGEN SATURATION: 98 % | WEIGHT: 262.6 LBS | BODY MASS INDEX: 48.02 KG/M2

## 2025-03-26 DIAGNOSIS — I47.10 SUPRAVENTRICULAR TACHYCARDIA: ICD-10-CM

## 2025-03-26 DIAGNOSIS — R00.2 PALPITATION: ICD-10-CM

## 2025-03-26 DIAGNOSIS — I10 PRIMARY HYPERTENSION: ICD-10-CM

## 2025-03-26 DIAGNOSIS — Z95.818 STATUS POST PLACEMENT OF IMPLANTABLE LOOP RECORDER: Primary | ICD-10-CM

## 2025-03-26 DIAGNOSIS — I48.91 ATRIAL FIBRILLATION, UNSPECIFIED TYPE (HCC): ICD-10-CM

## 2025-03-26 PROCEDURE — 99214 OFFICE O/P EST MOD 30 MIN: CPT | Performed by: INTERNAL MEDICINE

## 2025-03-26 PROCEDURE — 3074F SYST BP LT 130 MM HG: CPT | Performed by: INTERNAL MEDICINE

## 2025-03-26 PROCEDURE — 3078F DIAST BP <80 MM HG: CPT | Performed by: INTERNAL MEDICINE

## 2025-03-26 NOTE — PROGRESS NOTES
HPI:  A 65-year-old female here for followup.  She had a loop recorder placed in 2020 and AFib ablation in 2021.  She has been doing relatively well, maintaining on long-term flecainide and Eliquis. Over the past month or so, she has had increasing palpitations.  She was wondering if atrial fibrillation is back.  However, subcutaneous loop recorder is now end of service.  No chest pain, syncope.  She also had hematuria last year and we talked briefly about WATCHMAN.    Impression:  History of symptomatic atrial fibrillation with PVI in June 2021.  History of focal atrial tachycardia by EP study in April 2021.  Maintained on flecainide as well as Eliquis.  Subcutaneous loop recorder in 2020, now end of service.  History of anemia and hematuria in 2024, back on Eliquis.  Sick sinus syndrome limiting AV blocking agents.  Hypertension, controlled.  Spinal stenosis.  Echocardiogram in 2021 with normal EF.    Plan:  Given her known paroxysmal atrial fibrillation as well as atrial tachycardia, bradycardia and her subcutaneous loop recorder being at [facility], I talked about exchanging her loop recorder.  This is more important now especially since over the past month, she has had increasing palpitations.    I also briefly talked about pursuing WATCHMAN, which she has some questions about.  When I see her at the time of loop recorder, if she is interested in the WATCHMAN, we can make arrangements with the coordinator.      Wt Readings from Last 3 Encounters:   03/26/25 119.1 kg (262 lb 9.6 oz)   02/05/25 116.6 kg (257 lb 0.9 oz)   09/20/24 116.6 kg (257 lb)     Past Medical History:   Diagnosis Date    Arthritis     bl knees     Atrial fibrillation (HCC)     Essential hypertension        Current Outpatient Medications   Medication Sig Dispense Refill    pantoprazole (PROTONIX) 20 MG tablet Take 1 tablet by mouth every morning (before breakfast) 30 tablet 0    atorvastatin (LIPITOR) 20 MG tablet Take 1 tablet by mouth daily

## 2025-03-26 NOTE — PATIENT INSTRUCTIONS
[facility]          Patient  EP Instructions    Patient’s Name:  Taylor Davis    You are scheduled to have a loop recorder exchange (explant/implant) on  05/05/2025 , at 0930a.    Please check in at 0830a.    Please go to [facility] and park in the outpatient parking lot that is located around to the back of the hospital and enter through the [facility].   Once you enter through the [facility] check in with the  there.  The  will either give you directions or assist you in getting to the cath holding area.          3.  You are not to eat or drink anything after midnight the night before your procedure.     Please continue to take your medications with a small sip of water on the morning of the procedure with the following exceptions:  hold eliquis 48 hours prior to procedure.     If you are diabetic, do not take your insulin/sugar pill the morning of the procedure.    We encourage families to wait in the waiting room on the first floor while the procedure is being done.  The Doctor will come out and talk with you as soon as the procedure is over.    There is the possibility that you may spend the night in the hospital, depending on the results of the procedure.  This will be determined after the procedure is done.     8.   If you or your family have any questions, please call our office Monday-Friday 9:00am         -4:30 pm , at [phone], and ask to speak to one of the nurses.

## 2025-04-14 ENCOUNTER — HOSPITAL ENCOUNTER (OUTPATIENT)
Facility: HOSPITAL | Age: 65
Setting detail: SPECIMEN
Discharge: HOME | End: 2025-04-17

## 2025-04-14 LAB — LABCORP SPECIMEN COLLECTION: NORMAL

## 2025-04-14 PROCEDURE — 99001 SPECIMEN HANDLING PT-LAB: CPT

## 2025-04-28 NOTE — PROGRESS NOTES
Verified arrival time of 0900 for 0930 procedure on 5/5. Pre-procedure instructions verified including NPO after midnight and which meds to take and/or hold. All questions answered, patient verbalized understanding.

## 2025-05-02 NOTE — H&P
Plan explant, then implant new loop recorder given recent increase in symptoms.    HPI:  A 65-year-old female here for followup.  She had a loop recorder placed in 2020 and AFib ablation in 2021.  She has been doing relatively well, maintaining on long-term flecainide and Eliquis. Over the past month or so, she has had increasing palpitations.  She was wondering if atrial fibrillation is back.  However, subcutaneous loop recorder is now end of service.  No chest pain, syncope.  She also had hematuria last year and we talked briefly about WATCHMAN.     Impression:  History of symptomatic atrial fibrillation with PVI in June 2021.  History of focal atrial tachycardia by EP study in April 2021.  Maintained on flecainide as well as Eliquis.  Subcutaneous loop recorder in 2020, now end of service.  History of anemia and hematuria in 2024, back on Eliquis.  Sick sinus syndrome limiting AV blocking agents.  Hypertension, controlled.  Spinal stenosis.  Echocardiogram in 2021 with normal EF.     Plan:  Given her known paroxysmal atrial fibrillation as well as atrial tachycardia, bradycardia and her subcutaneous loop recorder being at [facility], I talked about exchanging her loop recorder.  This is more important now especially since over the past month, she has had increasing palpitations.     I also briefly talked about pursuing WATCHMAN, which she has some questions about.  When I see her at the time of loop recorder, if she is interested in the WATCHMAN, we can make arrangements with the coordinator.            Wt Readings from Last 3 Encounters:   03/26/25 119.1 kg (262 lb 9.6 oz)   02/05/25 116.6 kg (257 lb 0.9 oz)   09/20/24 116.6 kg (257 lb)      Past Medical History        Past Medical History:   Diagnosis Date    Arthritis       bl knees     Atrial fibrillation (HCC)      Essential hypertension              Current Facility-Administered Medications          Current Outpatient Medications   Medication Sig Dispense Refill

## 2025-05-05 ENCOUNTER — HOSPITAL ENCOUNTER (OUTPATIENT)
Facility: HOSPITAL | Age: 65
Setting detail: OUTPATIENT SURGERY
Discharge: HOME | End: 2025-05-05
Attending: INTERNAL MEDICINE | Admitting: INTERNAL MEDICINE
Payer: MEDICAID

## 2025-05-05 VITALS
TEMPERATURE: 98.4 F | SYSTOLIC BLOOD PRESSURE: 136 MMHG | HEIGHT: 62 IN | HEART RATE: 71 BPM | OXYGEN SATURATION: 100 % | RESPIRATION RATE: 24 BRPM | WEIGHT: 258.5 LBS | DIASTOLIC BLOOD PRESSURE: 76 MMHG | BODY MASS INDEX: 47.57 KG/M2

## 2025-05-05 DIAGNOSIS — Z45.09 ENCOUNTER FOR INTERROGATION OF CARDIAC RECORDER: ICD-10-CM

## 2025-05-05 DIAGNOSIS — I47.10 PAROXYSMAL SUPRAVENTRICULAR TACHYCARDIA: ICD-10-CM

## 2025-05-05 LAB — ECHO BSA: 2.26 M2

## 2025-05-05 PROCEDURE — 33285 INSJ SUBQ CAR RHYTHM MNTR: CPT | Performed by: INTERNAL MEDICINE

## 2025-05-05 PROCEDURE — 33286 RMVL SUBQ CAR RHYTHM MNTR: CPT | Performed by: INTERNAL MEDICINE

## 2025-05-05 PROCEDURE — 2709999900 HC NON-CHARGEABLE SUPPLY: Performed by: INTERNAL MEDICINE

## 2025-05-05 PROCEDURE — C1764 EVENT RECORDER, CARDIAC: HCPCS | Performed by: INTERNAL MEDICINE

## 2025-05-05 PROCEDURE — 7100000011 HC PHASE II RECOVERY - ADDTL 15 MIN: Performed by: INTERNAL MEDICINE

## 2025-05-05 PROCEDURE — 6360000002 HC RX W HCPCS: Performed by: INTERNAL MEDICINE

## 2025-05-05 PROCEDURE — 7100000010 HC PHASE II RECOVERY - FIRST 15 MIN: Performed by: INTERNAL MEDICINE

## 2025-05-05 DEVICE — ICM LNQ22 LINQ II PRIME US
Type: IMPLANTABLE DEVICE | Status: FUNCTIONAL
Brand: LINQ II™

## 2025-05-05 RX ORDER — LIDOCAINE HYDROCHLORIDE AND EPINEPHRINE 10; 10 MG/ML; UG/ML
INJECTION, SOLUTION INFILTRATION; PERINEURAL PRN
Status: DISCONTINUED | OUTPATIENT
Start: 2025-05-05 | End: 2025-05-05 | Stop reason: HOSPADM

## 2025-05-05 RX ORDER — OXYCODONE AND ACETAMINOPHEN 5; 325 MG/1; MG/1
1 TABLET ORAL EVERY 6 HOURS PRN
Qty: 12 TABLET | Refills: 0 | Status: SHIPPED | OUTPATIENT
Start: 2025-05-05 | End: 2025-05-08

## 2025-05-05 NOTE — PROGRESS NOTES
Cath holding summary:    0820: Patient ambulated from waiting area without difficulty, placed on monitor NSR. A&O, no c/o. ID, NPO status, allergies verified. H&P reviewed, med rec completed. Consent ready for signature.    0957: Verbal report given to Brynn on [facility]. Procedure started at bedside.      1016: Verbal report received from Brynn on [facility] for routine post-op. Report consisted of patient's Situation, Background, Assessment and Recommendations (SBAR). Information from the following report(s) Nurse Handoff Report, Surgery Report, Intake/Output, MAR, Recent Results, Med Rec Status, Cardiac Rhythm NSR, and Event Log was reviewed with the receiving nurse. Opportunity for questions and clarification was provided. Assessment completed upon patient's arrival to unit and care assumed.   Procedure: Loop recorder  Intervention: Yes  Site: Chest      ***: AVS Discharge instructions reviewed with patient, all questions answered. Patient verbalized understanding, copy given. Procedural site within normal limits, PIV removed. No hematoma or bleeding noted from procedural or IV site. Patient denied complaints, discharged with support person in stable condition. Escorted out to vehicle for transport home.

## 2025-05-14 ENCOUNTER — CLINICAL SUPPORT (OUTPATIENT)
Age: 65
End: 2025-05-14
Payer: MEDICAID

## 2025-05-14 DIAGNOSIS — R00.2 PALPITATION: ICD-10-CM

## 2025-05-14 DIAGNOSIS — I47.10 PAROXYSMAL SUPRAVENTRICULAR TACHYCARDIA: ICD-10-CM

## 2025-05-14 DIAGNOSIS — I47.10 SUPRAVENTRICULAR TACHYCARDIA: ICD-10-CM

## 2025-05-14 DIAGNOSIS — Z95.818 STATUS POST PLACEMENT OF IMPLANTABLE LOOP RECORDER: Primary | ICD-10-CM

## 2025-05-14 DIAGNOSIS — Z95.818 IMPLANTABLE LOOP RECORDER PRESENT: ICD-10-CM

## 2025-05-14 DIAGNOSIS — I48.91 ATRIAL FIBRILLATION, UNSPECIFIED TYPE (HCC): ICD-10-CM

## 2025-05-14 DIAGNOSIS — I48.0 PAROXYSMAL ATRIAL FIBRILLATION (HCC): ICD-10-CM

## 2025-05-14 PROCEDURE — 93290 INTERROG DEV EVAL ICPMS IP: CPT | Performed by: INTERNAL MEDICINE

## 2025-05-15 ENCOUNTER — RESULTS FOLLOW-UP (OUTPATIENT)
Dept: CARDIOLOGY | Facility: HOSPITAL | Age: 65
End: 2025-05-15

## 2025-06-19 ENCOUNTER — APPOINTMENT (OUTPATIENT)
Facility: HOSPITAL | Age: 65
End: 2025-06-19
Payer: MEDICAID

## 2025-06-19 ENCOUNTER — HOSPITAL ENCOUNTER (EMERGENCY)
Facility: HOSPITAL | Age: 65
Discharge: HOME | End: 2025-06-19
Attending: EMERGENCY MEDICINE
Payer: MEDICAID

## 2025-06-19 VITALS
HEIGHT: 62 IN | RESPIRATION RATE: 16 BRPM | SYSTOLIC BLOOD PRESSURE: 128 MMHG | WEIGHT: 257 LBS | TEMPERATURE: 98.8 F | OXYGEN SATURATION: 99 % | DIASTOLIC BLOOD PRESSURE: 88 MMHG | HEART RATE: 80 BPM | BODY MASS INDEX: 47.29 KG/M2

## 2025-06-19 DIAGNOSIS — R11.2 NAUSEA AND VOMITING, UNSPECIFIED VOMITING TYPE: ICD-10-CM

## 2025-06-19 DIAGNOSIS — R10.31 ABDOMINAL PAIN, RIGHT LOWER QUADRANT: Primary | ICD-10-CM

## 2025-06-19 DIAGNOSIS — R19.00 PELVIC MASS IN FEMALE: ICD-10-CM

## 2025-06-19 LAB
ALBUMIN SERPL-MCNC: 3.8 G/DL (ref 3.4–5)
ALBUMIN/GLOB SERPL: 1.1 (ref 0.8–1.7)
ALP SERPL-CCNC: 132 U/L (ref 45–117)
ALT SERPL-CCNC: 97 U/L (ref 10–35)
ANION GAP SERPL CALC-SCNC: 14 MMOL/L (ref 3–18)
AST SERPL-CCNC: 66 U/L (ref 10–38)
BASOPHILS # BLD: 0.04 K/UL (ref 0–0.1)
BASOPHILS NFR BLD: 1.3 % (ref 0–2)
BILIRUB SERPL-MCNC: 1.2 MG/DL (ref 0.2–1)
BUN SERPL-MCNC: 15 MG/DL (ref 6–23)
BUN/CREAT SERPL: 13 (ref 12–20)
CALCIUM SERPL-MCNC: 9.7 MG/DL (ref 8.5–10.1)
CHLORIDE SERPL-SCNC: 104 MMOL/L (ref 98–107)
CO2 SERPL-SCNC: 18 MMOL/L (ref 21–32)
CREAT SERPL-MCNC: 1.16 MG/DL (ref 0.6–1.3)
DIFFERENTIAL METHOD BLD: ABNORMAL
EKG ATRIAL RATE: 75 BPM
EKG DIAGNOSIS: NORMAL
EKG P AXIS: 58 DEGREES
EKG P-R INTERVAL: 150 MS
EKG Q-T INTERVAL: 402 MS
EKG QRS DURATION: 78 MS
EKG QTC CALCULATION (BAZETT): 448 MS
EKG R AXIS: 8 DEGREES
EKG T AXIS: -3 DEGREES
EKG VENTRICULAR RATE: 75 BPM
EOSINOPHIL # BLD: 0.24 K/UL (ref 0–0.4)
EOSINOPHIL NFR BLD: 7.8 % (ref 0–5)
ERYTHROCYTE [DISTWIDTH] IN BLOOD BY AUTOMATED COUNT: 15 % (ref 11.6–14.5)
GLOBULIN SER CALC-MCNC: 3.3 G/DL (ref 2–4)
GLUCOSE SERPL-MCNC: 94 MG/DL (ref 74–108)
HCT VFR BLD AUTO: 31.8 % (ref 35–45)
HGB BLD-MCNC: 10.2 G/DL (ref 12–16)
IMM GRANULOCYTES # BLD AUTO: 0.03 K/UL (ref 0–0.04)
IMM GRANULOCYTES NFR BLD AUTO: 1 % (ref 0–0.5)
LIPASE SERPL-CCNC: 22 U/L (ref 13–75)
LYMPHOCYTES # BLD: 1.18 K/UL (ref 0.9–3.6)
LYMPHOCYTES NFR BLD: 38.3 % (ref 21–52)
MCH RBC QN AUTO: 26.9 PG (ref 24–34)
MCHC RBC AUTO-ENTMCNC: 32.1 G/DL (ref 31–37)
MCV RBC AUTO: 83.9 FL (ref 78–100)
MONOCYTES # BLD: 0.26 K/UL (ref 0.05–1.2)
MONOCYTES NFR BLD: 8.4 % (ref 3–10)
NEUTS SEG # BLD: 1.33 K/UL (ref 1.8–8)
NEUTS SEG NFR BLD: 43.2 % (ref 40–73)
NRBC # BLD: 0 K/UL (ref 0–0.01)
NRBC BLD-RTO: 0 PER 100 WBC
PLATELET # BLD AUTO: 198 K/UL (ref 135–420)
PMV BLD AUTO: 10.7 FL (ref 9.2–11.8)
POTASSIUM SERPL-SCNC: 4 MMOL/L (ref 3.5–5.5)
PROT SERPL-MCNC: 7 G/DL (ref 6.4–8.2)
RBC # BLD AUTO: 3.79 M/UL (ref 4.2–5.3)
SODIUM SERPL-SCNC: 136 MMOL/L (ref 136–145)
WBC # BLD AUTO: 3.1 K/UL (ref 4.6–13.2)

## 2025-06-19 PROCEDURE — 96375 TX/PRO/DX INJ NEW DRUG ADDON: CPT

## 2025-06-19 PROCEDURE — 85025 COMPLETE CBC W/AUTO DIFF WBC: CPT

## 2025-06-19 PROCEDURE — 80053 COMPREHEN METABOLIC PANEL: CPT

## 2025-06-19 PROCEDURE — 74176 CT ABD & PELVIS W/O CONTRAST: CPT

## 2025-06-19 PROCEDURE — 99284 EMERGENCY DEPT VISIT MOD MDM: CPT

## 2025-06-19 PROCEDURE — 6360000002 HC RX W HCPCS: Performed by: EMERGENCY MEDICINE

## 2025-06-19 PROCEDURE — 96374 THER/PROPH/DIAG INJ IV PUSH: CPT

## 2025-06-19 PROCEDURE — 93010 ELECTROCARDIOGRAM REPORT: CPT | Performed by: INTERNAL MEDICINE

## 2025-06-19 PROCEDURE — 83690 ASSAY OF LIPASE: CPT

## 2025-06-19 PROCEDURE — 2580000003 HC RX 258: Performed by: EMERGENCY MEDICINE

## 2025-06-19 PROCEDURE — 93005 ELECTROCARDIOGRAM TRACING: CPT | Performed by: EMERGENCY MEDICINE

## 2025-06-19 RX ORDER — 0.9 % SODIUM CHLORIDE 0.9 %
1000 INTRAVENOUS SOLUTION INTRAVENOUS ONCE
Status: COMPLETED | OUTPATIENT
Start: 2025-06-19 | End: 2025-06-19

## 2025-06-19 RX ORDER — ONDANSETRON 2 MG/ML
4 INJECTION INTRAMUSCULAR; INTRAVENOUS ONCE
Status: COMPLETED | OUTPATIENT
Start: 2025-06-19 | End: 2025-06-19

## 2025-06-19 RX ORDER — TRAMADOL HYDROCHLORIDE 50 MG/1
25 TABLET ORAL EVERY 8 HOURS PRN
Qty: 5 TABLET | Refills: 0 | Status: SHIPPED | OUTPATIENT
Start: 2025-06-19 | End: 2025-06-22

## 2025-06-19 RX ORDER — IOPAMIDOL 612 MG/ML
100 INJECTION, SOLUTION INTRAVASCULAR
Status: DISCONTINUED | OUTPATIENT
Start: 2025-06-19 | End: 2025-06-20 | Stop reason: HOSPADM

## 2025-06-19 RX ORDER — ACETAMINOPHEN 325 MG/1
650 TABLET ORAL EVERY 4 HOURS PRN
Qty: 120 TABLET | Refills: 0 | Status: SHIPPED | OUTPATIENT
Start: 2025-06-19

## 2025-06-19 RX ORDER — ONDANSETRON 4 MG/1
4 TABLET, ORALLY DISINTEGRATING ORAL EVERY 8 HOURS PRN
Qty: 20 TABLET | Refills: 0 | Status: SHIPPED | OUTPATIENT
Start: 2025-06-19 | End: 2025-06-26

## 2025-06-19 RX ORDER — MORPHINE SULFATE 4 MG/ML
4 INJECTION, SOLUTION INTRAMUSCULAR; INTRAVENOUS
Refills: 0 | Status: COMPLETED | OUTPATIENT
Start: 2025-06-19 | End: 2025-06-19

## 2025-06-19 RX ADMIN — ONDANSETRON 4 MG: 2 INJECTION, SOLUTION INTRAMUSCULAR; INTRAVENOUS at 19:29

## 2025-06-19 RX ADMIN — MORPHINE SULFATE 4 MG: 4 INJECTION, SOLUTION INTRAMUSCULAR; INTRAVENOUS at 19:31

## 2025-06-19 RX ADMIN — SODIUM CHLORIDE 1000 ML: 0.9 INJECTION, SOLUTION INTRAVENOUS at 19:25

## 2025-06-19 ASSESSMENT — PAIN SCALES - GENERAL: PAINLEVEL_OUTOF10: 10

## 2025-06-19 ASSESSMENT — PAIN - FUNCTIONAL ASSESSMENT: PAIN_FUNCTIONAL_ASSESSMENT: 0-10

## 2025-06-19 ASSESSMENT — PAIN DESCRIPTION - LOCATION: LOCATION: ABDOMEN

## 2025-06-19 ASSESSMENT — PAIN DESCRIPTION - ORIENTATION: ORIENTATION: RIGHT

## 2025-06-19 ASSESSMENT — PAIN DESCRIPTION - DESCRIPTORS: DESCRIPTORS: ACHING

## 2025-06-19 NOTE — ED PROVIDER NOTES
Emergency Physician Note  [facility] EMERGENCY DEPARTMENT  [address] [address]  Dept: [phone]  Loc: [phone]  Open Note Time:  3:07 PM EDT     Chief Complaint   Abdominal Pain and Nausea & Vomiting      Limitations of exam/history:  none     History of Present Illness   Taylor Davis is a 65 y.o. female  has a past medical history of Arthritis, Atrial fibrillation (HCC), and Essential hypertension. who presents to the ED with a chief complaint of with abdominal pain and right lower quadrant pain.  She states that starting Tuesday night she had right lower quadrant pain that she describes as sharp.  It does radiate down to her groin area.  It is associated with nausea and vomiting.    Nursing notes reviewed.     Medical History   I have reviewed the following from the nursing documentation:      Prior to Admission medications    Medication Sig Start Date End Date Taking? Authorizing Provider   ondansetron (ZOFRAN-ODT) 4 MG disintegrating tablet Take 1 tablet by mouth every 8 hours as needed for Nausea or Vomiting May Sub regular tablet (non-ODT) if insurance does not cover ODT. 6/19/25 6/26/25 Yes Alexia Reyna MD   acetaminophen (TYLENOL) 325 MG tablet Take 2 tablets by mouth every 4 hours as needed for Pain 6/19/25  Yes Alexia Reyna MD   traMADol (ULTRAM) 50 MG tablet Take 0.5 tablets by mouth every 8 hours as needed for Pain for up to 3 days. Intended supply: 3 days. Take lowest dose possible to manage pain Max Daily Amount: 75 mg 6/19/25 6/22/25 Yes Alexia Reyna MD   apixaban (ELIQUIS) 5 MG TABS tablet TAKE 1 TABLET BY MOUTH TWO TIMES A DAY 5/6/25   Armando Smith MD   pantoprazole (PROTONIX) 20 MG tablet Take 1 tablet by mouth every morning (before breakfast) 9/22/24   Kayode Corrales MD   atorvastatin (LIPITOR) 20 MG tablet Take 1 tablet by mouth daily 3/11/24   Provider, MD Ramya   metoprolol tartrate (LOPRESSOR) 50  a very pleasant patient presents with abdominal pain of unclear etiology. At the time of discharge, patient is without significant evidence of toxicity, shock, sepsis, hemodynamic or cardiopulmonary instability, acute appendicitis, acute cholecystitis, AAA, bowel obstruction, bowel perforation, herpes zoster, a cardiac or pulmonary etiology as a cause for the abdominal symptoms, or any disease process requiring other immediate surgical or medical intervention at this time.      A CT scan was performed to evaluate for potential causes of the abdominal pain, however, neither the clinical exam nor the CT has identified an emergent etiology for the abdominal pain. Based on the physical exam, the laboratory studies, and the CT findings, I have a low suspicion for a surgical emergency or any other life-threatening disease process at this time. I have discussed with the patient the level of uncertainty with undifferentiated abdominal pain and that it may be too early in the disease process to make a definitive diagnosis of all causes of serious causes of abdominal pain.      After treatment in the ER, patient had improvement of their symptoms.  On repeat physical exam, patient has a benign abdominal exam.  Pain management and follow-up plan were discussed with the patient.     I have clearly explained the need to follow-up as noted on the discharge instructions. The patient understands that all disease processes change over time and therefore close follow up and serial exams are mandatory should the symptoms not resolve completely.   The patient understands to return to the Emergency Department immediately if the pain worsens, develops fever, persistent and uncontrollable vomiting, or for any new symptoms or concerns. Otherwise, they will follow the discharge instructions to arrange close follow up for a re-evaluation.  It is understood that if the patient is not improving as expected or if other new symptoms or signs of

## 2025-06-20 ASSESSMENT — ENCOUNTER SYMPTOMS
NAUSEA: 1
ANAL BLEEDING: 0
ABDOMINAL PAIN: 1
BLOOD IN STOOL: 0
VOMITING: 1
SHORTNESS OF BREATH: 0

## 2025-06-27 ENCOUNTER — HOSPITAL ENCOUNTER (EMERGENCY)
Age: 65
Discharge: HOME | End: 2025-06-27
Attending: EMERGENCY MEDICINE
Payer: MEDICAID

## 2025-06-27 ENCOUNTER — APPOINTMENT (OUTPATIENT)
Age: 65
End: 2025-06-27
Payer: MEDICAID

## 2025-06-27 VITALS
SYSTOLIC BLOOD PRESSURE: 138 MMHG | HEART RATE: 71 BPM | HEIGHT: 63 IN | OXYGEN SATURATION: 100 % | DIASTOLIC BLOOD PRESSURE: 75 MMHG | TEMPERATURE: 98.2 F | WEIGHT: 255 LBS | BODY MASS INDEX: 45.18 KG/M2 | RESPIRATION RATE: 18 BRPM

## 2025-06-27 DIAGNOSIS — R74.8 ELEVATED LIVER ENZYMES: ICD-10-CM

## 2025-06-27 DIAGNOSIS — R11.2 NAUSEA AND VOMITING, UNSPECIFIED VOMITING TYPE: Primary | ICD-10-CM

## 2025-06-27 DIAGNOSIS — R10.11 ABDOMINAL PAIN, RIGHT UPPER QUADRANT: ICD-10-CM

## 2025-06-27 DIAGNOSIS — R19.00 PELVIC MASS IN FEMALE: ICD-10-CM

## 2025-06-27 DIAGNOSIS — N39.0 URINARY TRACT INFECTION IN FEMALE: ICD-10-CM

## 2025-06-27 LAB
ALBUMIN SERPL-MCNC: 3.8 G/DL (ref 3.4–5)
ALBUMIN/GLOB SERPL: 1.1 (ref 1–1.9)
ALP SERPL-CCNC: 157 U/L (ref 45–117)
ALT SERPL-CCNC: 216 U/L (ref 10–35)
ANION GAP SERPL CALC-SCNC: 12 MMOL/L (ref 7–16)
APPEARANCE UR: CLEAR
AST SERPL-CCNC: 188 U/L (ref 10–38)
BACTERIA URNS QL MICRO: ABNORMAL /HPF
BILIRUB SERPL-MCNC: 0.8 MG/DL (ref 0.2–1)
BILIRUB UR QL: NEGATIVE
BUN SERPL-MCNC: 14 MG/DL (ref 6–23)
BUN/CREAT SERPL: 16
CALCIUM SERPL-MCNC: 9.3 MG/DL (ref 8.5–10.1)
CHLORIDE SERPL-SCNC: 107 MMOL/L (ref 98–107)
CO2 SERPL-SCNC: 21 MMOL/L (ref 21–32)
COLOR UR: YELLOW
CREAT SERPL-MCNC: 0.91 MG/DL (ref 0.6–1.3)
EKG ATRIAL RATE: 72 BPM
EKG DIAGNOSIS: NORMAL
EKG P AXIS: 59 DEGREES
EKG P-R INTERVAL: 210 MS
EKG Q-T INTERVAL: 412 MS
EKG QRS DURATION: 90 MS
EKG QTC CALCULATION (BAZETT): 451 MS
EKG R AXIS: -8 DEGREES
EKG T AXIS: 38 DEGREES
EKG VENTRICULAR RATE: 72 BPM
EPITH CASTS URNS QL MICRO: ABNORMAL /LPF (ref 0–5)
ERYTHROCYTE [DISTWIDTH] IN BLOOD BY AUTOMATED COUNT: 15.1 % (ref 11.6–14.5)
GLOBULIN SER CALC-MCNC: 3.5 G/DL (ref 2.3–3.5)
GLUCOSE SERPL-MCNC: 88 MG/DL (ref 74–108)
GLUCOSE UR STRIP.AUTO-MCNC: NEGATIVE MG/DL
HCT VFR BLD AUTO: 31.8 % (ref 35–45)
HGB BLD-MCNC: 10.1 G/DL (ref 12–16)
HGB UR QL STRIP: NEGATIVE
KETONES UR QL STRIP.AUTO: NEGATIVE MG/DL
LEUKOCYTE ESTERASE UR QL STRIP.AUTO: ABNORMAL
LIPASE SERPL-CCNC: 23 U/L (ref 13–75)
MCH RBC QN AUTO: 27.6 PG (ref 24–34)
MCHC RBC AUTO-ENTMCNC: 31.8 G/DL (ref 31–37)
MCV RBC AUTO: 86.9 FL (ref 78–100)
NITRITE UR QL STRIP.AUTO: POSITIVE
NRBC # BLD: 0 K/UL (ref 0–0.01)
NRBC BLD-RTO: 0 PER 100 WBC
PH UR STRIP: 8 (ref 5–8)
PLATELET # BLD AUTO: 181 K/UL (ref 135–420)
PMV BLD AUTO: 11.3 FL (ref 9.2–11.8)
POTASSIUM SERPL-SCNC: 4.5 MMOL/L (ref 3.5–5.5)
PROT SERPL-MCNC: 7.3 G/DL (ref 6.4–8.2)
PROT UR STRIP-MCNC: NEGATIVE MG/DL
RBC # BLD AUTO: 3.66 M/UL (ref 4.2–5.3)
RBC #/AREA URNS HPF: ABNORMAL /HPF (ref 0–5)
SODIUM SERPL-SCNC: 140 MMOL/L (ref 136–145)
SP GR UR REFRACTOMETRY: 1.02 (ref 1–1.03)
UROBILINOGEN UR QL STRIP.AUTO: 1 EU/DL (ref 0.2–1)
WBC # BLD AUTO: 3.9 K/UL (ref 4.6–13.2)
WBC URNS QL MICRO: ABNORMAL /HPF (ref 0–4)

## 2025-06-27 PROCEDURE — 85027 COMPLETE CBC AUTOMATED: CPT

## 2025-06-27 PROCEDURE — 96374 THER/PROPH/DIAG INJ IV PUSH: CPT

## 2025-06-27 PROCEDURE — 2500000003 HC RX 250 WO HCPCS: Performed by: EMERGENCY MEDICINE

## 2025-06-27 PROCEDURE — 6370000000 HC RX 637 (ALT 250 FOR IP): Performed by: EMERGENCY MEDICINE

## 2025-06-27 PROCEDURE — 80053 COMPREHEN METABOLIC PANEL: CPT

## 2025-06-27 PROCEDURE — 81003 URINALYSIS AUTO W/O SCOPE: CPT

## 2025-06-27 PROCEDURE — 81001 URINALYSIS AUTO W/SCOPE: CPT

## 2025-06-27 PROCEDURE — 99284 EMERGENCY DEPT VISIT MOD MDM: CPT

## 2025-06-27 PROCEDURE — 76705 ECHO EXAM OF ABDOMEN: CPT

## 2025-06-27 PROCEDURE — 2580000003 HC RX 258: Performed by: EMERGENCY MEDICINE

## 2025-06-27 PROCEDURE — 6360000002 HC RX W HCPCS: Performed by: EMERGENCY MEDICINE

## 2025-06-27 PROCEDURE — 96375 TX/PRO/DX INJ NEW DRUG ADDON: CPT

## 2025-06-27 PROCEDURE — 83690 ASSAY OF LIPASE: CPT

## 2025-06-27 PROCEDURE — 93005 ELECTROCARDIOGRAM TRACING: CPT

## 2025-06-27 RX ORDER — 0.9 % SODIUM CHLORIDE 0.9 %
1000 INTRAVENOUS SOLUTION INTRAVENOUS ONCE
Status: COMPLETED | OUTPATIENT
Start: 2025-06-27 | End: 2025-06-27

## 2025-06-27 RX ORDER — MORPHINE SULFATE 4 MG/ML
4 INJECTION, SOLUTION INTRAMUSCULAR; INTRAVENOUS
Status: COMPLETED | OUTPATIENT
Start: 2025-06-27 | End: 2025-06-27

## 2025-06-27 RX ORDER — ONDANSETRON 4 MG/1
4 TABLET, ORALLY DISINTEGRATING ORAL EVERY 8 HOURS PRN
Qty: 10 TABLET | Refills: 0 | Status: SHIPPED | OUTPATIENT
Start: 2025-06-27

## 2025-06-27 RX ORDER — FAMOTIDINE 20 MG/1
20 TABLET, FILM COATED ORAL 2 TIMES DAILY
Qty: 10 TABLET | Refills: 0 | Status: SHIPPED | OUTPATIENT
Start: 2025-06-27 | End: 2025-07-02

## 2025-06-27 RX ORDER — METOCLOPRAMIDE HYDROCHLORIDE 5 MG/ML
10 INJECTION INTRAMUSCULAR; INTRAVENOUS
Status: COMPLETED | OUTPATIENT
Start: 2025-06-27 | End: 2025-06-27

## 2025-06-27 RX ORDER — ONDANSETRON 2 MG/ML
4 INJECTION INTRAMUSCULAR; INTRAVENOUS
Status: COMPLETED | OUTPATIENT
Start: 2025-06-27 | End: 2025-06-27

## 2025-06-27 RX ORDER — DIPHENHYDRAMINE HYDROCHLORIDE 50 MG/ML
25 INJECTION, SOLUTION INTRAMUSCULAR; INTRAVENOUS
Status: COMPLETED | OUTPATIENT
Start: 2025-06-27 | End: 2025-06-27

## 2025-06-27 RX ORDER — PROCHLORPERAZINE EDISYLATE 5 MG/ML
5 INJECTION INTRAMUSCULAR; INTRAVENOUS
Status: COMPLETED | OUTPATIENT
Start: 2025-06-27 | End: 2025-06-27

## 2025-06-27 RX ORDER — CEFPODOXIME PROXETIL 100 MG/1
100 TABLET, FILM COATED ORAL 2 TIMES DAILY
Qty: 14 TABLET | Refills: 0 | Status: SHIPPED | OUTPATIENT
Start: 2025-06-27 | End: 2025-07-04

## 2025-06-27 RX ORDER — ACETAMINOPHEN 325 MG/1
650 TABLET ORAL
Status: COMPLETED | OUTPATIENT
Start: 2025-06-27 | End: 2025-06-27

## 2025-06-27 RX ADMIN — PROCHLORPERAZINE EDISYLATE 5 MG: 5 INJECTION INTRAMUSCULAR; INTRAVENOUS at 12:16

## 2025-06-27 RX ADMIN — ACETAMINOPHEN 650 MG: 325 TABLET ORAL at 15:09

## 2025-06-27 RX ADMIN — DIPHENHYDRAMINE HYDROCHLORIDE 25 MG: 50 INJECTION, SOLUTION INTRAMUSCULAR; INTRAVENOUS at 16:46

## 2025-06-27 RX ADMIN — ONDANSETRON 4 MG: 2 INJECTION, SOLUTION INTRAMUSCULAR; INTRAVENOUS at 11:13

## 2025-06-27 RX ADMIN — MORPHINE SULFATE 4 MG: 4 INJECTION, SOLUTION INTRAMUSCULAR; INTRAVENOUS at 12:25

## 2025-06-27 RX ADMIN — METOCLOPRAMIDE 10 MG: 5 INJECTION, SOLUTION INTRAMUSCULAR; INTRAVENOUS at 16:46

## 2025-06-27 RX ADMIN — SODIUM CHLORIDE 1000 ML: 900 INJECTION, SOLUTION INTRAVENOUS at 11:12

## 2025-06-27 RX ADMIN — FAMOTIDINE 20 MG: 10 INJECTION, SOLUTION INTRAVENOUS at 15:10

## 2025-06-27 ASSESSMENT — LIFESTYLE VARIABLES
HOW MANY STANDARD DRINKS CONTAINING ALCOHOL DO YOU HAVE ON A TYPICAL DAY: PATIENT DOES NOT DRINK
HOW OFTEN DO YOU HAVE A DRINK CONTAINING ALCOHOL: NEVER

## 2025-06-27 ASSESSMENT — PAIN SCALES - GENERAL
PAINLEVEL_OUTOF10: 10
PAINLEVEL_OUTOF10: 9

## 2025-06-27 ASSESSMENT — PAIN - FUNCTIONAL ASSESSMENT
PAIN_FUNCTIONAL_ASSESSMENT: 0-10
PAIN_FUNCTIONAL_ASSESSMENT: 0-10

## 2025-06-27 NOTE — ED PROVIDER NOTES
[facility] EMERGENCY DEPT  EMERGENCY DEPARTMENT HISTORY AND PHYSICAL EXAM      Pt Name: Taylor Davis  MRN: [id number]  Birthdate 1960  Date of evaluation: 6/27/2025  Provider: Derek Mobley DO    CHIEF COMPLAINT       Chief Complaint   Patient presents with    Abdominal Pain    Vomiting         HISTORY OF PRESENT ILLNESS   (Location/Symptom, Timing/Onset, Context/Setting, Quality, Duration, Modifying Factors, Severity)  Note limiting factors.   Taylor Davis is a 65 y.o. female with past medical history of A-fib, hypertension who presents to the emergency department presents emergency department with intermittent nausea and vomiting for a week.  She complains of right-sided abdominal pain mainly right upper quadrant area.  She indicates that she was seen at [facility] ED a week ago and had a CT scan that showed a mass in her right side abdomen.  She says that she is supposed to get an ultrasound but no one called her.  Has minimal urinary discomfort but no hematuria or flank pain.  No fever, chills, chest pain, focal weakness, numbness, dizziness, leg pain, flank pain, bloody stool, hematemesis, and no other complaints.    The history is provided by the patient. No  was used.       Nursing Notes were reviewed.    REVIEW OF SYSTEMS    (2-9 systems for level 4, 10 or more for level 5)     Review of Systems    Except as noted above the remainder of the review of systems was reviewed and negative.       PAST MEDICAL HISTORY     Past Medical History:   Diagnosis Date    Arthritis     bl knees     Atrial fibrillation (HCC)     Essential hypertension          SURGICAL HISTORY       Past Surgical History:   Procedure Laterality Date    COLONOSCOPY N/A 9/22/2024    COLONOSCOPY w/ Polypectomies performed by Marbin Stone MD at [facility] ENDOSCOPY    COMPRE ELECTROPHYSIOL XM W/LEFT ATRIAL PACNG/REC N/A 4/26/2021    Lt Atrial Pace & Record During Ep Study performed by Armando Smith MD at [facility] CARDIAC CATH  not in acute distress.     Appearance: Normal appearance. She is well-developed. She is not ill-appearing or toxic-appearing.   HENT:      Head: Normocephalic and atraumatic.      Right Ear: External ear normal.      Left Ear: External ear normal.      Nose: Nose normal. No rhinorrhea.      Mouth/Throat:      Mouth: Mucous membranes are moist.      Pharynx: No oropharyngeal exudate or posterior oropharyngeal erythema.   Eyes:      General: No scleral icterus.     Extraocular Movements: Extraocular movements intact.      Conjunctiva/sclera: Conjunctivae normal.      Pupils: Pupils are equal, round, and reactive to light.   Neck:      Comments: No tracheal deviation  Cardiovascular:      Rate and Rhythm: Normal rate.      Pulses: Normal pulses.      Heart sounds: Normal heart sounds. No murmur heard.     No friction rub. No gallop.   Pulmonary:      Effort: Pulmonary effort is normal. No respiratory distress.      Breath sounds: Normal breath sounds. No stridor. No wheezing, rhonchi or rales.   Abdominal:      General: Abdomen is flat. There is no distension.      Palpations: Abdomen is soft. There is no shifting dullness, fluid wave, hepatomegaly, splenomegaly, mass or pulsatile mass.      Tenderness: There is abdominal tenderness in the right upper quadrant. There is no right CVA tenderness, left CVA tenderness, guarding or rebound. Negative signs include Jefferson's sign and McBurney's sign.      Hernia: No hernia is present.   Musculoskeletal:         General: No swelling or tenderness. Normal range of motion.      Cervical back: Normal range of motion and neck supple. No rigidity or tenderness.      Right lower leg: No edema.      Left lower leg: No edema.   Lymphadenopathy:      Cervical: No cervical adenopathy.   Skin:     General: Skin is warm and dry.      Capillary Refill: Capillary refill takes less than 2 seconds.      Coloration: Skin is not cyanotic, jaundiced or pale.      Findings: No erythema.

## 2025-06-27 NOTE — ED TRIAGE NOTES
WC to triage with c/o intermittent N/V and RLQ abdominal pain x 1 week. \"I was seen at [facility] ED 1 week ago and they said I had a mass in my right lower abdomen and that I need to get an ultrasound but the vomiting got worse today and is like a green bile\". Denies CP or SOB.

## 2025-06-27 NOTE — ED NOTES
Pt states she has been waiting a week for the doctor to call her for an ultrasound.  States the pain is getting worse, and she would like to get an ultrasound today

## 2025-06-27 NOTE — ED NOTES
Pt assisted to bathroom to collect urine sample. Pt vomited after returning to room. Dr. Mobley aware

## 2025-07-24 ENCOUNTER — CLINICAL SUPPORT (OUTPATIENT)
Age: 65
End: 2025-07-24

## 2025-07-24 ENCOUNTER — OFFICE VISIT (OUTPATIENT)
Age: 65
End: 2025-07-24
Payer: MEDICAID

## 2025-07-24 VITALS
WEIGHT: 258.4 LBS | HEIGHT: 63 IN | BODY MASS INDEX: 45.79 KG/M2 | SYSTOLIC BLOOD PRESSURE: 130 MMHG | HEART RATE: 58 BPM | OXYGEN SATURATION: 100 % | DIASTOLIC BLOOD PRESSURE: 82 MMHG

## 2025-07-24 DIAGNOSIS — Z95.818 IMPLANTABLE LOOP RECORDER PRESENT: Primary | ICD-10-CM

## 2025-07-24 DIAGNOSIS — I10 PRIMARY HYPERTENSION: ICD-10-CM

## 2025-07-24 DIAGNOSIS — R00.2 PALPITATIONS: ICD-10-CM

## 2025-07-24 DIAGNOSIS — I47.10 PAROXYSMAL SUPRAVENTRICULAR TACHYCARDIA: ICD-10-CM

## 2025-07-24 DIAGNOSIS — Z95.818 STATUS POST PLACEMENT OF IMPLANTABLE LOOP RECORDER: ICD-10-CM

## 2025-07-24 DIAGNOSIS — I48.0 PAROXYSMAL ATRIAL FIBRILLATION (HCC): ICD-10-CM

## 2025-07-24 DIAGNOSIS — I47.10 SUPRAVENTRICULAR TACHYCARDIA: ICD-10-CM

## 2025-07-24 DIAGNOSIS — I48.91 ATRIAL FIBRILLATION, UNSPECIFIED TYPE (HCC): Primary | ICD-10-CM

## 2025-07-24 DIAGNOSIS — I48.91 ATRIAL FIBRILLATION, UNSPECIFIED TYPE (HCC): ICD-10-CM

## 2025-07-24 DIAGNOSIS — R00.2 PALPITATION: ICD-10-CM

## 2025-07-24 PROCEDURE — 99214 OFFICE O/P EST MOD 30 MIN: CPT | Performed by: INTERNAL MEDICINE

## 2025-07-24 PROCEDURE — 3079F DIAST BP 80-89 MM HG: CPT | Performed by: INTERNAL MEDICINE

## 2025-07-24 PROCEDURE — 3075F SYST BP GE 130 - 139MM HG: CPT | Performed by: INTERNAL MEDICINE

## 2025-07-24 RX ORDER — FLECAINIDE ACETATE 50 MG/1
50 TABLET ORAL 2 TIMES DAILY
Qty: 180 TABLET | Refills: 3 | Status: SHIPPED | OUTPATIENT
Start: 2025-07-24

## 2025-07-24 RX ORDER — METOPROLOL TARTRATE 50 MG
50 TABLET ORAL 2 TIMES DAILY
Qty: 180 TABLET | Refills: 3 | Status: SHIPPED | OUTPATIENT
Start: 2025-07-24

## 2025-07-24 NOTE — PROGRESS NOTES
HPI:  A 65-year-old female here for followup.  She has not had any recent bloody stools, remains on Eliquis.  We talked about [employer] in the past.  In general, she is feeling well.  No new chest pain, dyspnea, PND, orthopnea, or edema.  When she does housework, sometimes she will get a little short of breath.    Impression:  1. History of symptomatic atrial fibrillation with PVI in June 2021.  2. History of atrial tachycardia by EP study in April 2021, maintained on long-term flecainide as well as Eliquis.  3. Subcutaneous loop recorder in 2022, change out 2025.  4. History of anemia, hematuria.  5. Hypertension reasonably controlled.  6. History of spinal stenosis.  7. Echocardiogram in 2020 with normal function.    Plan:  She has a loop recorder with no prolonged sustained events.  Palpitations are reasonably controlled.  She tells me that she takes her flecainide, cuts it in half, takes it twice a day.  From the computer, it looks like it is a 50 mg tab, so I am going to refill 25 mg twice daily and continue metoprolol and Eliquis.  I will see her back in six months unless there is a clinical change.        Wt Readings from Last 3 Encounters:   07/24/25 117.2 kg (258 lb 6.4 oz)   06/27/25 115.7 kg (255 lb)   06/19/25 116.6 kg (257 lb)     Past Medical History:   Diagnosis Date    Arthritis     bl knees     Atrial fibrillation (HCC)     Essential hypertension        Current Outpatient Medications   Medication Sig Dispense Refill    ondansetron (ZOFRAN-ODT) 4 MG disintegrating tablet Take 1 tablet by mouth every 8 hours as needed for Nausea or Vomiting 10 tablet 0    acetaminophen (TYLENOL) 325 MG tablet Take 2 tablets by mouth every 4 hours as needed for Pain 120 tablet 0    apixaban (ELIQUIS) 5 MG TABS tablet TAKE 1 TABLET BY MOUTH TWO TIMES A  tablet 2    pantoprazole (PROTONIX) 20 MG tablet Take 1 tablet by mouth every morning (before breakfast) 30 tablet 0    atorvastatin (LIPITOR) 20 MG tablet Take 1

## (undated) DEVICE — DRESSING HEMSTAT W4XL4IN 4 PLY WHT IMPREG KAOLIN HYDRPHLC

## (undated) DEVICE — UNIDIRECTIONAL STEERABLE DIAGNOSTIC CATHETER: Brand: EP XT™

## (undated) DEVICE — INTENDED FOR TISSUE SEPARATION, AND OTHER PROCEDURES THAT REQUIRE A SHARP SURGICAL BLADE TO PUNCTURE OR CUT.: Brand: BARD-PARKER ®  SAFETY SCALPED

## (undated) DEVICE — SNARE VASC L240CM LOOP W10MM SHTH DIA2.4MM RND STIFF CLD

## (undated) DEVICE — APPLICATOR MEDICATED 26 CC SOLUTION HI LT ORNG CHLORAPREP

## (undated) DEVICE — TUBE SET IRR PUMP THERMALCOOL -- SMARTABLATE

## (undated) DEVICE — SYRINGE MED 25GA 3ML L5/8IN SUBQ PLAS W/ DETACH NDL SFTY

## (undated) DEVICE — SURGICAL PROCEDURE KIT CUST CYRO MMC

## (undated) DEVICE — SHTH STEERABLE FLEXCATH 12 FR --

## (undated) DEVICE — Z DISCONTINUED PER MEDLINE DRESSING ALG W9XL10CM SIL CVR WTRPRF VIR BACT BARR ANTIMIC

## (undated) DEVICE — CATHETER ELECTROPHYSIOLOGY CRD 2 5 MM 6 FRX120 CM SUPREME

## (undated) DEVICE — PATCH CARTO 3 EXT REF --

## (undated) DEVICE — FORCEPS BX L240CM JAW DIA2.8MM L CAP W/ NDL MIC MESH TOOTH

## (undated) DEVICE — KENDALL RADIOLUCENT FOAM MONITORING ELECTRODE RECTANGULAR SHAPE: Brand: KENDALL

## (undated) DEVICE — TRAP EVAC NO 5500 DISPOS-A-TRAP

## (undated) DEVICE — LIMB HOLDER, WRIST/ANKLE: Brand: DEROYAL

## (undated) DEVICE — YANKAUER,SMOOTH HANDLE,HIGH CAPACITY: Brand: MEDLINE INDUSTRIES, INC.

## (undated) DEVICE — MEDI-TRACE CADENCE ADULT, DEFIBRILLATION ELECTRODE -RTS  (10 PR/PK) - PHYSIO-CONTROL: Brand: MEDI-TRACE CADENCE

## (undated) DEVICE — INTRODUCER SHTH 7FR CANN L11CM DIL TIP 35MM ORNG TUNGSTEN

## (undated) DEVICE — GAUZE,SPONGE,4"X4",16PLY,STRL,LF,10/TRAY: Brand: MEDLINE

## (undated) DEVICE — FABRIC REINFORCED, SURGICAL GOWN, LG: Brand: CONVERTORS

## (undated) DEVICE — ENDOSCOPY PUMP TUBING/ CAP SET: Brand: ERBE

## (undated) DEVICE — Device

## (undated) DEVICE — BITE BLOCK ENDOSCP UNIV AD 6 TO 9.4 MM

## (undated) DEVICE — INTRODUCER SHTH 6FR CANN L11CM DIL TIP 35MM GRN TUNGSTEN

## (undated) DEVICE — UNDERPAD INCONT W23XL36IN STD BLU POLYPR BK FLUF SFT

## (undated) DEVICE — GOWN PLASTIC FILM THMBHKS UNIV BLUE: Brand: CARDINAL HEALTH

## (undated) DEVICE — FIXED CURVE DIAGNOSTIC CATHETER: Brand: VIKING™ SOFT TIP

## (undated) DEVICE — 3M™ BAIR HUGGER® UNDERBODY BLANKET, ADULT, 10 PER CASE 54500: Brand: BAIR HUGGER™

## (undated) DEVICE — INTRO SHTH FST-CTH 0.038INX14 --

## (undated) DEVICE — Device: Brand: NRG TRANSSEPTAL NEEDLE

## (undated) DEVICE — PACK PROCEDURE SURG VASC CATH 161 MMC LF

## (undated) DEVICE — SYRINGE MED 10ML LUERLOCK TIP W/O SFTY DISP

## (undated) DEVICE — INTRODUCER SHTH 8FR CANN L11CM DIL TIP 35MM BLU TUNGSTEN

## (undated) DEVICE — BASIN EMSIS 16OZ GRAPHITE PLAS KID SHP MOLD GRAD FOR ORAL

## (undated) DEVICE — REM POLYHESIVE ADULT PATIENT RETURN ELECTRODE: Brand: VALLEYLAB

## (undated) DEVICE — DRAPE SURG W25XL50IN E OPN CIR BND BG

## (undated) DEVICE — STERILE POLYISOPRENE POWDER-FREE SURGICAL GLOVES: Brand: PROTEXIS

## (undated) DEVICE — NEEDLE HYPO 25GA L1.5IN BLU POLYPR HUB S STL REG BVL STR

## (undated) DEVICE — SUTURE REMOVAL TRAY: Brand: MEDLINE INDUSTRIES, INC.

## (undated) DEVICE — CATHETER ART 20 GAX4 CM 22 GA RADIAL FEP

## (undated) DEVICE — SNARE POLYP M W27MMXL240CM OVL STIFF DISP CAPTIVATOR

## (undated) DEVICE — DRESSING HYDROFIBER AQUACEL AG ADVANT 3.5X4 IN

## (undated) DEVICE — SUTURE MCRYL SZ 4 0 L18IN ABSRB VLT PS 1 L24MM 3 8 CIR REV Y682H

## (undated) DEVICE — COPILOT BLEEDBACK CONTROL VALVE: Brand: COPILOT

## (undated) DEVICE — SUTURE MONOCRYL SZ 4 0 L18IN ABSRB VLT PS 1 L24MM 3 8 CIR REV Y682H

## (undated) DEVICE — KIT ELECTRICAL UMBILICAL --

## (undated) DEVICE — SOLUTION IRRIG 1000ML H2O STRL BLT

## (undated) DEVICE — CANNULA ORIG TL CLR W FOAM CUSHIONS AND 14FT SUPL TB 3 CHN

## (undated) DEVICE — SYRINGE MED 50ML LUERSLIP TIP

## (undated) DEVICE — CABLE CATH CONN 10 PIN DISP -- ACHIEVE ADVANCE

## (undated) DEVICE — TORFLEX TRANSSEPTAL SHEATH; TRANSSEPTAL DILATOR; J-TIP GUIDEWIRE: Brand: TORFLEX TRANSSEPTAL GUIDING SHEATH

## (undated) DEVICE — CATHETER SUCT TR FL TIP 14FR W/ O CTRL

## (undated) DEVICE — INTRODUCER SHTH 9FR CANN L11CM DIL TIP 35MM BLK TUNGSTEN

## (undated) DEVICE — SYRINGE MED 10ML TRNSLUC BRL PLUNG BLK MRK POLYPR CTRL

## (undated) DEVICE — CANNULA NSL AD TBNG L14FT STD PVC O2 CRV CONN NONFLARED NSL

## (undated) DEVICE — DRAPE,ANGIO,BRACH,STERILE,38X44: Brand: MEDLINE

## (undated) DEVICE — MEDI-VAC NON-CONDUCTIVE SUCTION TUBING: Brand: CARDINAL HEALTH

## (undated) DEVICE — MEDICINE CUP, GRADUATED, STER: Brand: MEDLINE

## (undated) DEVICE — PRESSURE MONITORING SET: Brand: TRUWAVE

## (undated) DEVICE — MAYO STAND COVER: Brand: CONVERTORS

## (undated) DEVICE — CATH CRYOABLATN EP 28MM -- ARCTIC FRONT

## (undated) DEVICE — FORCEPS SURG L5IN S STL STR SERR HEMSTAT MOSQ

## (undated) DEVICE — CATH EP ACHV MPPNG 3.3FR 20MM -- ACHIEVE

## (undated) DEVICE — SYRINGE 20ML LL S/C 50

## (undated) DEVICE — Device: Brand: PROTRACK PIGTAIL WIRE

## (undated) DEVICE — LINER SUCT CANSTR 3000CC PLAS SFT PRE ASSEMB W/OUT TBNG W/

## (undated) DEVICE — SUTURE PERMA HND SZ 0 L18IN NONABSORBABLE BLK L30MM PSL REV 580H